# Patient Record
Sex: FEMALE | Race: BLACK OR AFRICAN AMERICAN | NOT HISPANIC OR LATINO | Employment: PART TIME | URBAN - METROPOLITAN AREA
[De-identification: names, ages, dates, MRNs, and addresses within clinical notes are randomized per-mention and may not be internally consistent; named-entity substitution may affect disease eponyms.]

---

## 2017-01-13 ENCOUNTER — HOSPITAL ENCOUNTER (OUTPATIENT)
Dept: PULMONOLOGY | Facility: OTHER | Age: 77
Discharge: HOME | End: 2017-01-13
Attending: INTERNAL MEDICINE
Payer: MEDICARE

## 2017-01-13 VITALS — HEIGHT: 63 IN | WEIGHT: 128 LBS | BODY MASS INDEX: 22.68 KG/M2

## 2017-01-13 DIAGNOSIS — R06.02 SHORTNESS OF BREATH: ICD-10-CM

## 2017-01-13 PROCEDURE — 94060 EVALUATION OF WHEEZING: CPT

## 2017-01-13 PROCEDURE — 94620 *HC PUL STRESS; SIMPLE (6MIN WALK): CPT

## 2017-01-13 PROCEDURE — 94729 DIFFUSING CAPACITY: CPT

## 2017-01-13 PROCEDURE — 94727 GAS DIL/WSHOT DETER LNG VOL: CPT

## 2017-01-13 RX ORDER — ALBUTEROL SULFATE 2.5 MG/.5ML
SOLUTION RESPIRATORY (INHALATION)
Status: DISCONTINUED
Start: 2017-01-13 | End: 2017-01-14 | Stop reason: HOSPADM

## 2017-01-16 NOTE — PROCEDURES
DIAGNOSIS:  Shortness of breath.    The patient is a 76-year-old female, 63 inches tall, 128 pounds.  She has a   forced vital capacity of 1.55 L, which is 70% of predicted.  The FEV1 is 1.15,   which is 76% of predicted.  FEF 25-75 is reduced at 39% of predicted.  There is   no significant response to bronchodilators.  Lung volume shows air trapping.    Diffusing capacity corrects for alveolar ventilation.  Flow volume loop and   spirogram are consistent with above.    ASSESSMENT:  1.  Small airway obstruction with no significant response to bronchodilators.  2.  Air trapping consistent with above.  3.  Mild restrictive ventilatory defect.  4.  No evidence of a diffusion defect.      CCS/SN  dd: 01/16/2017 08:26:39 (CST)  td: 01/16/2017 08:48:20 (CST)  Doc ID   #[id number]  Job ID #[id number]    CC: Mauricio Calderon M.D.

## 2017-05-08 NOTE — PLAN OF CARE
05/08/17 1241   ED Admissions Case Approval   ED Admissions Case Approval (!) CM Approved  (OP )

## 2017-05-09 ENCOUNTER — HOSPITAL ENCOUNTER (OUTPATIENT)
Dept: RADIOLOGY | Facility: OTHER | Age: 77
Discharge: HOME | End: 2017-05-09
Attending: ANESTHESIOLOGY
Payer: MEDICARE

## 2017-05-09 ENCOUNTER — HOSPITAL ENCOUNTER (OUTPATIENT)
Dept: PREADMISSION TESTING | Facility: OTHER | Age: 77
Discharge: HOME | End: 2017-05-09
Attending: SPECIALIST
Payer: MEDICARE

## 2017-05-09 VITALS
SYSTOLIC BLOOD PRESSURE: 154 MMHG | OXYGEN SATURATION: 99 % | HEIGHT: 63 IN | WEIGHT: 127 LBS | TEMPERATURE: 98 F | BODY MASS INDEX: 22.5 KG/M2 | HEART RATE: 86 BPM | DIASTOLIC BLOOD PRESSURE: 71 MMHG

## 2017-05-09 DIAGNOSIS — I10 HTN (HYPERTENSION): ICD-10-CM

## 2017-05-09 DIAGNOSIS — I10 ESSENTIAL HYPERTENSION: ICD-10-CM

## 2017-05-09 DIAGNOSIS — R06.02 SHORTNESS OF BREATH: ICD-10-CM

## 2017-05-09 DIAGNOSIS — Z01.818 PREOP TESTING: Primary | ICD-10-CM

## 2017-05-09 LAB
BNP SERPL-MCNC: 262 PG/ML
TROPONIN I SERPL DL<=0.01 NG/ML-MCNC: 0.04 NG/ML

## 2017-05-09 PROCEDURE — 84484 ASSAY OF TROPONIN QUANT: CPT

## 2017-05-09 PROCEDURE — 83880 ASSAY OF NATRIURETIC PEPTIDE: CPT

## 2017-05-09 PROCEDURE — 93005 ELECTROCARDIOGRAM TRACING: CPT

## 2017-05-09 PROCEDURE — 36415 COLL VENOUS BLD VENIPUNCTURE: CPT

## 2017-05-09 PROCEDURE — 71020 XR CHEST PA AND LATERAL PRE-OP: CPT | Mod: 26,,, | Performed by: RADIOLOGY

## 2017-05-09 PROCEDURE — 71020 XR CHEST PA AND LATERAL PRE-OP: CPT | Mod: TC

## 2017-05-09 PROCEDURE — 93010 ELECTROCARDIOGRAM REPORT: CPT | Mod: ,,, | Performed by: INTERNAL MEDICINE

## 2017-05-09 RX ORDER — SODIUM CHLORIDE, SODIUM LACTATE, POTASSIUM CHLORIDE, CALCIUM CHLORIDE 600; 310; 30; 20 MG/100ML; MG/100ML; MG/100ML; MG/100ML
INJECTION, SOLUTION INTRAVENOUS CONTINUOUS
Status: CANCELLED | OUTPATIENT
Start: 2017-05-09

## 2017-05-09 RX ORDER — PIOGLITAZONEHYDROCHLORIDE 15 MG/1
15 TABLET ORAL DAILY
COMMUNITY
End: 2018-03-07

## 2017-05-09 RX ORDER — FAMOTIDINE 20 MG/1
20 TABLET, FILM COATED ORAL
Status: CANCELLED | OUTPATIENT
Start: 2017-05-09 | End: 2017-05-09

## 2017-05-09 RX ORDER — PANTOPRAZOLE SODIUM 40 MG/1
40 TABLET, DELAYED RELEASE ORAL DAILY
COMMUNITY
End: 2019-01-01

## 2017-05-09 NOTE — IP AVS SNAPSHOT
[facility] Location ([facility])  [address] [place], [place] [address]  Phone: [phone]           Patient Discharge Instructions  Our goal is to set you up for success. This packet includes information on your condition, medications, and your home care. It will help you care for yourself to prevent having to return to the hospital.     Please ask your nurse if you have any questions.      There are many details to remember when preparing for your surgery. Here is what you will need to do, please ask your nurse if there are more specific instructions and if you have any questions:    1. Before procedure Do not smoke or drink alcoholic beverages 24 hours prior to your procedure. Do not eat or drink anything 8 hours before your procedure - this includes gum, mints, and candy.     2. Day of procedure Please remove all jewelry for the procedure. If you wear contact lenses, dentures, hearing aids or glasses, bring a container to put them in during your surgery and give to a family member.  If your doctor has scheduled you for an overnight stay, bring a small overnight bag with any personal items that you need.      3. After procedure  Make arrangements in advance for transportation home by a responsible adult. It is not safe to drive a vehicle during the 24 hours following surgery.     PLEASE NOTE: You may be contacted the day before your surgery to confirm your surgery date and arrival time. The Surgery schedule has many variables which may affect the time of your surgery case. Family members should be available if your surgery time changes.               ** Verify the list of medication(s) below is accurate and up to date. Carry this with you in case of emergency. If your medications have changed, please notify your healthcare provider.             Medication List      TAKE these medications        Additional Info                      ALEVE ORAL   Refills:  0   Dose:  1 tablet    Instructions:  Take 1  tablet by mouth as needed.     Begin Date    AM    Noon    PM    Bedtime       ANORO ELLIPTA INHL   Refills:  0    Instructions:  Inhale into the lungs once daily.     Begin Date    AM    Noon    PM    Bedtime       aspirin 81 MG EC tablet   Commonly known as:  ECOTRIN   Quantity:  90 tablet   Refills:  3   Dose:  81 mg    Instructions:  Take 1 tablet (81 mg total) by mouth once daily.     Begin Date    AM    Noon    PM    Bedtime       atorvastatin 20 MG tablet   Commonly known as:  LIPITOR   Quantity:  90 tablet   Refills:  3   Dose:  20 mg    Instructions:  Take 1 tablet (20 mg total) by mouth once daily.     Begin Date    AM    Noon    PM    Bedtime       benazepril 20 MG tablet   Commonly known as:  LOTENSIN   Quantity:  90 tablet   Refills:  3   Dose:  20 mg    Instructions:  Take 1 tablet (20 mg total) by mouth once daily.     Begin Date    AM    Noon    PM    Bedtime       FISH OIL ORAL   Refills:  0   Dose:  1200 mg    Instructions:  Take 1,200 mg by mouth.     Begin Date    AM    Noon    PM    Bedtime       FLUZONE HIGH-DOSE 2015-16 (PF) 180 mcg/0.5 mL Syrg   Refills:  0   Generic drug:  flu vacc ts 2015-16(65yr+)(PF)      Begin Date    AM    Noon    PM    Bedtime       furosemide 20 MG tablet   Commonly known as:  LASIX   Quantity:  90 tablet   Refills:  0    Instructions:  TAKE 1 TABLET BY MOUTH EVERY DAY     Begin Date    AM    Noon    PM    Bedtime       glipiZIDE 5 MG Tr24   Commonly known as:  GLUCOTROL   Refills:  4      Begin Date    AM    Noon    PM    Bedtime       IMODIUM A-D ORAL   Refills:  0   Dose:  1 tablet    Instructions:  Take 1 tablet by mouth as needed.     Begin Date    AM    Noon    PM    Bedtime       LINZESS ORAL   Refills:  0   Dose:  72 mcg    Instructions:  Take 72 mcg by mouth once daily.     Begin Date    AM    Noon    PM    Bedtime       metoprolol succinate 100 MG 24 hr tablet   Commonly known as:  TOPROL-XL   Quantity:  90 tablet   Refills:  0    Instructions:  TAKE 1  TABLET BY MOUTH EVERY DAY     Begin Date    AM    Noon    PM    Bedtime       pantoprazole 40 MG tablet   Commonly known as:  PROTONIX   Refills:  0   Dose:  40 mg    Instructions:  Take 40 mg by mouth once daily.     Begin Date    AM    Noon    PM    Bedtime       pioglitazone 15 MG tablet   Commonly known as:  ACTOS   Refills:  0   Dose:  15 mg    Instructions:  Take 15 mg by mouth once daily.     Begin Date    AM    Noon    PM    Bedtime       pioglitazone-metformin  mg per tablet   Commonly known as:  ACTOPLUS MET   Refills:  0      Begin Date    AM    Noon    PM    Bedtime       vitamin D 1000 units Tab   Refills:  0   Dose:  185 mg    Instructions:  Take 185 mg by mouth once daily.     Begin Date    AM    Noon    PM    Bedtime                  Please bring to all follow up appointments:    1. A copy of your discharge instructions.  2. All medicines you are currently taking in their original bottles.  3. Identification and insurance card.    Please arrive 15 minutes ahead of scheduled appointment time.    Please call 24 hours in advance if you must reschedule your appointment and/or time.        Your Scheduled Appointments     May 11, 2017 11:15 AM CDT   Established Patient Visit with Garrett Calderon MD   [facility]    [address], [address]  [facility] [address]   [phone]              Your Future Surgeries/Procedures     May 15, 2017   Surgery with Burke Perla MD   [facility] ([facility])    [address] [facility] [address]   [phone]                  Discharge Instructions       PRE-ADMIT TESTING -  [phone]    [address] [place] [address]        OUTPATIENT SURGERY UNIT - [phone]    Your surgery has been scheduled at [facility]. We are pleased to have the opportunity to serve you. For Further Information please call [phone].    On the day of surgery please  report to the Information Desk on the 1st floor.    CONTACT YOUR PHYSICIAN'S OFFICE THE DAY PRIOR TO YOUR SURGERY TO OBTAIN YOUR ARRIVAL TIME.     The evening before surgery do not eat anything after 9 p.m. ( this includes hard candy, chewing gum and mints).  You may only have GATORADE, POWERADE AND WATER  from 9 p.m. until you leave your home.   DO NOT DRINK ANY LIQUIDS ON THE WAY TO THE HOSPITAL.      SPECIAL MEDICATION INSTRUCTIONS: TAKE medications checked off by the Anesthesiologist on your Medication List.    Angiogram Patients: Take medications as instructed by your physician, including aspirin.     Surgery Patients:    If you take ASPIRIN - Your PHYSICIAN/SURGEON will need to inform you IF/OR when you need to stop taking aspirin prior to your surgery.     Do Not take any medications containing IBUPROFEN.  Do Not Wear any make-up or dark nail polish   (especially eye make-up) to surgery. If you come to surgery with makeup on you will be required to remove the makeup or nail polish.    Do not shave your surgical area at least 5 days prior to your surgery. The surgical prep will be performed at the hospital according to Infection Control regulations.    Leave all valuables at home.   Do Not wear any jewelry or watches, including any metal in body piercings.  Contact Lens must be removed before surgery. Either do not wear the contact lens or bring a case and solution for storage.  Please bring a container for eyeglasses or dentures as required.  Bring any paperwork your physician has provided, such as consent forms,  history and physicals, doctor's orders, etc.   Bring comfortable clothes that are loose fitting to wear upon discharge. Take into consideration the type of surgery being performed.  Maintain your diet as advised per your physician the day prior to surgery.      Adequate rest the night before surgery is advised.   Park in the Parking lot behind the hospital or in the [facility] [employer] [facility] across  "the street from the parking lot. Parking is complimentary.  If you will be discharged the same day as your procedure, please arrange for a responsible adult to drive you home or to accompany you if traveling by taxi.   YOU WILL NOT BE PERMITTED TO DRIVE OR TO LEAVE THE HOSPITAL ALONE AFTER SURGERY.   It is strongly recommended that you arrange for someone to remain with you for the first 24 hrs following your surgery.       Thank you for your cooperation.  The Staff of [facility].        Bathing Instructions                                                                 Please shower the evening before and morning of your procedure with    ANTIBACTERIAL SOAP. ( DIAL, etc )  Concentrate on the surgical area   for at least 3 minutes and rinse completely. Dry off as usual.   Do not use any deodorant, powder, body lotions, perfume, after shave or    cologne.                                                Admission Information     Date & Time Provider Department CSN    5/9/2017  8:30 AM Burke Perla MD [facility] [id number]      Care Providers     Provider Role Specialty Primary office phone    Burke Perla MD Attending Provider General Surgery [phone]      Your Vitals Were     BP Pulse Temp Height Weight SpO2    154/71 86 98.3 °F (36.8 °C) (Oral) 5' 3" (1.6 m) 57.6 kg (127 lb) 99%    BMI                22.5 kg/m2          Recent Lab Values     No lab values to display.      Allergies as of 5/9/2017        Reactions    Codeine Nausea And Vomiting    States can take oxycodone and hydrocodone    Sulfa (Sulfonamide Antibiotics) Nausea And Vomiting      [facility]     [facility] Nurse Care Line - 24/7 Assistance  Unless otherwise directed by your provider, please contact [facility], our nurse care line that is available for 24/7 assistance.     Registered nurses in the [facility] provide clinical advisement, health education, appointment " booking, and other advisory services.  Call for this free service at [phone].        Advance Directives     An advance directive is a document which, in the event you are no longer able to make decisions for yourself, tells your healthcare team what kind of treatment you do or do not want to receive, or who you would like to make those decisions for you.  If you do not currently have an advance directive, [facility] encourages you to create one.  For more information call:  [phone]), [phone] ([phone]),  or log on to [email or web address].        Language Assistance Services     ATTENTION: Language assistance services are available, free of charge. Please call [phone].      ATENCIÓN: Si habla español, tiene a zuluaga disposición servicios gratuitos de asistencia lingüística. Llame al [phone].     CHÚ Ý: N?u b?n nói Ti?ng Vi?t, có các d?ch v? h? tr? ngôn ng? mi?n phí dành cho b?n. G?i s? [phone].        Heart Failure Education       Heart Failure: Being Active  You have a condition called heart failure. Being active doesnt mean that you have to wear yourself out. Even a little movement each day helps to strengthen your heart. If you cant get out to exercise, you can do simple stretching and strengthening exercises at home. These are good ways to keep you well-conditioned and prevent you and your heart from becoming excessively weak.    Ideas to get you started  · Add a little movement to things you do now. Walk to mail letters. Park your car at the far end of the parking lot and walk to the store. Walk up a flight of stairs instead of taking the elevator.  · Choose activities you enjoy. You might walk, swim, or ride an exercise bike. Things like gardening and washing the car count, too. Other possibilities include: washing dishes, walking the dog, walking around the mall, and doing aerobic activities with friends.  · Join a group exercise program at a [facility] or [facility],  a senior center, or a community center. Or look into a hospital cardiac rehabilitation program. Ask your doctor if you qualify.  Tips to keep you going  · Get up and get dressed each day. Go to a coffee shop and read a newspaper or go somewhere that you'll be in the presence of other active people. Youll feel more like being active.  · Make a plan. Choose one or more activities that you enjoy and that you can easily do. Then plan to do at least one each day. You might write your plan on a calendar.  · Go with a friend or a group if you like company. This can help you feel supported and stay motivated, too.  · Plan social events that you enjoy. This will keep you mentally engaged as well as physically motivated to do things you find pleasure in.  For your safety  · Talk with your healthcare provider before starting an exercise program.  · Exercise indoors when its too hot or too cold outside, or when the air quality is poor. Try walking at a shopping mall.  · Wear socks and sturdy shoes to maintain your balance and prevent falls.  · Start slowly. Do a few minutes several times a day at first. Increase your time and speed little by little.  · Stop and rest whenever you feel tired or get short of breath.  · Dont push yourself on days when you dont feel well.  Date Last Reviewed: 3/20/2016  © 1785-6657 [employer]. [address] [place] [address]. All rights reserved. This information is not intended as a substitute for professional medical care. Always follow your healthcare professional's instructions.              Heart Failure: Evaluating Your Heart  You have a condition called heart failure. To evaluate your condition, your doctor will examine you, ask questions, and do some tests. Along with looking for signs of heart failure, the doctor looks for any other health problems that may have led to heart failure. The results of your evaluation will help your doctor form a treatment  plan.  Health history and physical exam  Your visit will start with a health history. Tell the doctor about any symptoms youve noticed and about all medicines you take. Then youll have a physical exam. This includes listening to your heartbeat and breathing. Youll also be checked for swelling (edema) in your legs and neck. When you have fluid buildup or fluid in the lungs, it may be called congestive heart failure.  Diagnosing heart failure     During an echocardiogram, sound waves bounce off the heart. These are converted into a picture on the screen.   The following may be done to help your doctor form a diagnosis:  · X-rays show the size and shape of your heart. These pictures can also show fluid in your lungs.  · An electrocardiogram (ECG or EKG) shows the pattern of your heartbeat. Small pads (electrodes) are placed on your chest, arms, and legs. Wires connect the pads to the ECG machine, which records your hearts electrical signals. This can give the doctor information about heart function.  · An echocardiogram uses ultrasound waves to show the structure and movement of your heart muscle. This shows how well the heart pumps. It also shows the thickness of the heart walls, and if the heart is enlarged. It is one of the most useful, non-invasive tests as it provides information about the heart's general function. This helps your doctor make treatment decisions.  · Lab tests evaluate small amounts of blood or urine for signs of problems. A BNP lab test can help diagnose and evaluate heart failure. BNP stands for B-type natriuretic peptide. The ventricles secrete more BNP when heart failure worsens. Lab tests can also provide information about metabolic dysfunction or heart dysfunction.  Your treatment plan  Based on the results of your evaluation and tests, your doctor will develop a treatment plan. This plan is designed to relieve some of your heart failure symptoms and help make you more comfortable. Your  treatment plan may include:  · Medicine to help your heart work better and improve your quality of life  · Changes in what you eat and drink to help prevent fluid from backing up in your body  · Daily monitoring of your weight and heart failure symptoms to see how well your treatment plan is working  · Exercise to help you stay healthy  · Help with quitting smoking  · Emotional and psychological support to help adjust to the changes  · Referrals to other specialists to make sure you are being treated comprehensively  Date Last Reviewed: 3/21/2016  © 0966-5569 [employer]. [address] [place] [address], [place] [address]. All rights reserved. This information is not intended as a substitute for professional medical care. Always follow your healthcare professional's instructions.              Heart Failure: Making Changes to Your Diet  You have a condition called heart failure. When you have heart failure, excess fluid is more likely to build up in your body because your heart isn't working well. This makes the heart work harder to pump blood. Fluid buildup causes symptoms such as shortness of breath and swelling (edema). This is often referred to as congestive heart failure or CHF. Controlling the amount of salt (sodium) you eat may help stop fluid from building up. Your doctor may also tell you to reduce the amount of fluid you drink.  Reading food labels    Your healthcare provider will tell you how much sodium you can eat each day. Read food labels to keep track. Keep in mind that certain foods are high in salt. These include canned, frozen, and processed foods. Check the amount of sodium in each serving. Watch out for high-sodium ingredients. These include MSG (monosodium glutamate), baking soda, and sodium phosphate.   Eating less salt  Give yourself time to get used to eating less salt. It may take a little while. Here are some tips to help:  · Take the saltshaker off the table. Replace it with salt-free  herb mixes and spices.  · Eat fresh or plain frozen vegetables. These have much less salt than canned vegetables.  · Choose low-sodium snacks like sodium-free pretzels, crackers, or air-popped popcorn.  · Dont add salt to your food when youre cooking. Instead, season your foods with pepper, lemon, garlic, or onion.  · When you eat out, ask that your food be cooked without added salt.  · Avoid eating fried foods as these often have a great deal of salt.  If youre told to limit fluids  You may need to limit how much fluid you have to help prevent swelling. This includes anything that is liquid at room temperature, such as ice cream and soup. If your doctor tells you to limit fluid, try these tips:  · Measure drinks in a measuring cup before you drink them. This will help you meet daily goals.  · Chill drinks to make them more refreshing.  · Suck on frozen lemon wedges to quench thirst.  · Only drink when youre thirsty.  · Chew sugarless gum or suck on hard candy to keep your mouth moist.  · Weigh yourself daily to know if your body's fluid content is rising.  My sodium goal  Your healthcare provider may give you a sodium goal to meet each day. This includes sodium found in food as well as salt that you add. My goal is to eat no more than ___________ mg of sodium per day.     When to call your doctor  Call your doctor right away if you have any symptoms of worsening heart failure. These can include:  · Sudden weight gain  · Increased swelling of your legs or ankles  · Trouble breathing when youre resting or at night  · Increase in the number of pillows you have to sleep on  · Chest pain, pressure, discomfort, or pain in the jaw, neck, or back   Date Last Reviewed: 3/21/2016  © 6505-0389 [employer]. [address] [place] [address], [place] [address]. All rights reserved. This information is not intended as a substitute for professional medical care. Always follow your healthcare professional's  instructions.              Heart Failure: Medicines to Help Your Heart    You have a condition called heart failure (also known as congestive heart failure, or CHF). Your doctor will likely prescribe medicines for heart failure and any underlying health problems you have. Most heart failure patients take one or more types of medicinen. Your healthcare provider will work to find the combination of medicines that works best for you.  Heart failure medicines  Here are the most common heart failure medicines:  · ACE inhibitors lower blood pressure and decrease strain on the heart. This makes it easier for the heart to pump. Angiotensin receptor blockers have similar effects. These are prescribed for some patients instead of ACE inhibitors.  · Beta-blockers relieve stress on the heart. They also improve symptoms. They may also improve the heart's pumping action over time.  · Diuretics (also called water pills) help rid your body of excess water. This can help rid your body of swelling (edema). Having less fluid to pump means your heart doesnt have to work as hard. Some diuretics make your body lose a mineral called potassium. Your doctor will tell you if you need to take supplements or eat more foods high in potassium.  · Digoxin helps your heart pump with more strength. This helps your heart pump more blood with each beat. So, more oxygen-rich blood travels to the rest of the body.  · Aldosterone antagonists help alter hormones and decrease strain on the heart.  · Hydralazine and nitrates are two separate medicines used together to treat heart failure. They may come in one combination pill. They lower blood pressure and decrease how hard the heart has to pump.  Medicines for related conditions  Controlling other heart problems helps keep heart failure under control, too. Depending on other heart problems you have, medicines may be prescribed to:  · Lower blood pressure (antihypertensives).  · Lower cholesterol  levels (statins).  · Prevent blood clots (anticoagulants or aspirin).  · Keep the heartbeat steady (antiarrhythmics).  Date Last Reviewed: 3/5/2016  © 8532-2561 [employer]. [address] [place] [address], [place] [address]. All rights reserved. This information is not intended as a substitute for professional medical care. Always follow your healthcare professional's instructions.              Heart Failure: Procedures That May Help    The heart is a muscle that pumps oxygen-rich blood to all parts of the body. When you have heart failure, the heart is not able to pump as well as it should. Blood and fluid may back up into the lungs (congestive heart failure), and some parts of the body dont get enough oxygen-rich blood to work normally. These problems lead to the symptoms of heart failure.     Certain procedures may help the heart pump better in some cases of heart failure. Some procedures are done to treat health problems that may have caused the heart failure such as coronary artery disease or heart rhythm problems. For more serious heart failure, other options are available.  Treating artery and valve problems  If you have coronary artery disease or valve disease, procedures may be done to improve blood flow. This helps the heart pump better, which can improve heart failure symptoms. First, your doctor may do a cardiac catheterization to help detect clogged blood vessels or valve damage. During this procedure, a  thin tube (catheter) in inserted into a blood vessel and guided to the heart. There a dye is injected and a special type of X-ray (angiogram) is taken of the blood vessels. Procedures to open a blocked artery or fix damaged valves can also be done using catheterization.  · Angioplasty uses a balloon-tipped instrument at the end of the catheter. The balloon is inflated to widen the narrowed artery. In many cases, a stent is expanded to further support the narrowed artery. A stent is a metal mesh  tube.  · Valve surgery repairs or replacement of faulty valves can also be done during catheterization so blood can flow properly through the chambers of the heart.  Bypass surgery is another option to help treat blocked arteries. It uses a healthy blood vessel from elsewhere in the body. The healthy blood vessel is attached above and below the blocked area so that blood can flow around the blocked artery.  Treating heart rhythm problems  A device may be placed in the chest to help a weak heart maintain a healthy, heartbeat so the heart can pump more effectively:  · Pacemaker. A pacemaker is an implanted device that regulates your heartbeat electronically. It monitors your heart's rhythm and generates a painless electric impulse that helps the heart beat in a regular rhythm. A pacemaker is programmed to meet your specific heart rhythm needs.  · Biventricular pacing/cardiac resynchronization therapy. A type of pacemaker that paces both pumping chambers of the heart at the same time to coordinate contractions and to improve the heart's function. Some people with heart failure are candidates for this therapy.  · Implantable cardioverter defibrillator. A device similar to a pacemaker that senses when the heart is beating too fast and delivers an electrical shock to convert the fast rhythm to a normal rhythm. This can be a life saving device.  In severe cases  In more serious cases of heart failure when other treatments no longer work, other options may include:  · Ventricular assist devices (VADs). These are mechanical devices used to take over the pumping function for one or both of the heart's ventricles, or pumping chambers. A VAD may be necessary when heart failure progresses to the point that medicines and other treatments no longer help. In some cases, a VAD may be used as a bridge to transplant.  · Heart transplant. This is replacing the diseased heart with a healthy one from a donor. This is an option for a few  people who are very sick. A heart transplant is very serious and not an option for all patients. Your doctor can tell you more.  Date Last Reviewed: 3/20/2016  © 5104-7988 [employer]. [address] [place] [address], [place] [address]. All rights reserved. This information is not intended as a substitute for professional medical care. Always follow your healthcare professional's instructions.              Heart Failure: Tracking Your Weight  You have a condition called heart failure. When you have heart failure, a sudden weight gain or a steady rise in weight is a warning sign that your body is retaining too much water and salt. This could mean your heart failure is getting worse. If left untreated, it can cause problems for your lungs and result in shortness of breath. Weighing yourself each day is the best way to know if youre retaining water. If your weight goes up quickly, call your doctor. You will be given instructions on how to get rid of the excess water. You will likely need medicines and to avoid salt. This will help your heart work better.  Call your doctor if you gain more than 2 pounds in 1 day, more than 5 pounds in 1 week, or whatever weight gain you were told to report by your doctor. This is often a sign of worsening heart failure and needs to be evaluated and treated. Your doctor will tell you what to do next.   Tips for weighing yourself    · Weigh yourself at the same time each morning, wearing the same clothes. Weigh yourself after urinating and before eating.  · Use the same scale each day. Make sure the numbers are easy to read. Put the scale on a flat, hard surface -- not on a rug or carpet.  · Do not stop weighing yourself. If you forget one day, weigh again the next morning.  How to use your weight chart  · Keep your weight chart near the scale. Write your weight on the chart as soon as you get off the scale.  · Fill in the month and the start date on the chart. Then write down your  weight each day. Your chart will look like this:    · If you miss a day, leave the space blank. Weigh yourself the next day and write your weight in the next space.  · Take your weight chart with you when you go to see your doctor.  Date Last Reviewed: 3/20/2016  © 3057-0005 [employer]. [address] [place] [address], [place] [address]. All rights reserved. This information is not intended as a substitute for professional medical care. Always follow your healthcare professional's instructions.              Heart Failure: Warning Signs of a Flare-Up  You have a condition called heart failure. Once you have heart failure, flare-ups can happen. Below are signs that can mean your heart failure is getting worse. If you notice any of these warning signs, call your healthcare provider.  Swelling    · Your feet, ankles, or lower legs get puffier.  · You notice skin changes on your lower legs.  · Your shoes feel too tight.  · Your clothes are tighter in the waist.  · You have trouble getting rings on or off your fingers.  Shortness of breath  · You have to breathe harder even when youre doing your normal activities or when youre resting.  · You are short of breath walking up stairs or even short distances.  · You wake up at night short of breath or coughing.  · You need to use more pillows or sit up to sleep.  · You wake up tired or restless.  Other warning signs  · You feel weaker, dizzy, or more tired.  · You have chest pain or changes in your heartbeat.  · You have a cough that wont go away.  · You cant remember things or dont feel like eating.  Tracking your weight  Gaining weight is often the first warning sign that heart failure is getting worse. Gaining even a few pounds can be a sign that your body is retaining excess water and salt. Weighing yourself each day in the morning after you urinate and before you eat, is the best way to know if you're retaining water. Get a scale that is easy to read and make  sure you wear the same clothes and use the same scale every time you weigh. Your healthcare provider will show you how to track your weight. Call your doctor if you gain more than 2 pounds in 1 day, 5 pounds in 1 week, or whatever weight gain you were told to report by your doctor. This is often a sign of worsening heart failure and needs to be evaluated and treated before it compromises your breathing. Your doctor will tell you what to do next.    Date Last Reviewed: 3/15/2016  © 5324-3008 [employer]. [address] [place] [address]. All rights reserved. This information is not intended as a substitute for professional medical care. Always follow your healthcare professional's instructions.              Chronic Kindey Disease Education             Diabetes Discharge Instructions                                   [facility] Sign-Up     Activating your [facility] account is as easy as 1-2-3!     1) Visit [email or web address], select Sign Up Now, enter this activation code and your date of birth, then select Next.  [email or web address]  Expires: 6/23/2017  8:58 AM      2) Create a username and password to use when you visit [facility] in the future and select a security question in case you lose your password and select Next.    3) Enter your e-mail address and click Sign Up!    Additional Information  If you have questions, please e-mail [email or web address] or call [phone] to talk to our [facility] staff. Remember, [facility] is NOT to be used for urgent needs. For medical emergencies, dial 911.          [facility] complies with applicable Federal civil rights laws and does not discriminate on the basis of race, color, national origin, age, disability, or sex.

## 2017-05-09 NOTE — PRE ADMISSION SCREENING
Outside lab (4/17/17) reviewed per Dr. Leyva.  Ok to cancel cbc, bmp.  EKG done today reviewed per Dr. Leyva.

## 2017-05-09 NOTE — DISCHARGE INSTRUCTIONS
PRE-ADMIT TESTING -  [phone]    [address]  [facility]        OUTPATIENT SURGERY UNIT - [phone]    Your surgery has been scheduled at [facility]. We are pleased to have the opportunity to serve you. For Further Information please call [phone].    On the day of surgery please report to the Information Desk on the 1st floor.    CONTACT YOUR PHYSICIAN'S OFFICE THE DAY PRIOR TO YOUR SURGERY TO OBTAIN YOUR ARRIVAL TIME.     The evening before surgery do not eat anything after 9 p.m. ( this includes hard candy, chewing gum and mints).  You may only have GATORADE, POWERADE AND WATER  from 9 p.m. until you leave your home.   DO NOT DRINK ANY LIQUIDS ON THE WAY TO THE HOSPITAL.      SPECIAL MEDICATION INSTRUCTIONS: TAKE medications checked off by the Anesthesiologist on your Medication List.    Angiogram Patients: Take medications as instructed by your physician, including aspirin.     Surgery Patients:    If you take ASPIRIN - Your PHYSICIAN/SURGEON will need to inform you IF/OR when you need to stop taking aspirin prior to your surgery.     Do Not take any medications containing IBUPROFEN.  Do Not Wear any make-up or dark nail polish   (especially eye make-up) to surgery. If you come to surgery with makeup on you will be required to remove the makeup or nail polish.    Do not shave your surgical area at least 5 days prior to your surgery. The surgical prep will be performed at the hospital according to Infection Control regulations.    Leave all valuables at home.   Do Not wear any jewelry or watches, including any metal in body piercings.  Contact Lens must be removed before surgery. Either do not wear the contact lens or bring a case and solution for storage.  Please bring a container for eyeglasses or dentures as required.  Bring any paperwork your physician has provided, such as consent forms,  history and physicals, doctor's orders, etc.   Bring comfortable clothes that  are loose fitting to wear upon discharge. Take into consideration the type of surgery being performed.  Maintain your diet as advised per your physician the day prior to surgery.      Adequate rest the night before surgery is advised.   Park in the Parking lot behind the hospital or in the [facility] across the street from the parking lot. Parking is complimentary.  If you will be discharged the same day as your procedure, please arrange for a responsible adult to drive you home or to accompany you if traveling by taxi.   YOU WILL NOT BE PERMITTED TO DRIVE OR TO LEAVE THE HOSPITAL ALONE AFTER SURGERY.   It is strongly recommended that you arrange for someone to remain with you for the first 24 hrs following your surgery.       Thank you for your cooperation.  The Staff of [facility].        Bathing Instructions                                                                 Please shower the evening before and morning of your procedure with    ANTIBACTERIAL SOAP. ( DIAL, etc )  Concentrate on the surgical area   for at least 3 minutes and rinse completely. Dry off as usual.   Do not use any deodorant, powder, body lotions, perfume, after shave or    cologne.

## 2017-05-15 ENCOUNTER — HOSPITAL ENCOUNTER (OUTPATIENT)
Facility: OTHER | Age: 77
LOS: 2 days | Discharge: HOME | End: 2017-05-18
Attending: SPECIALIST | Admitting: SPECIALIST
Payer: MEDICARE

## 2017-05-15 DIAGNOSIS — R06.02 SHORTNESS OF BREATH: ICD-10-CM

## 2017-05-15 DIAGNOSIS — R07.9 CHEST PAIN: ICD-10-CM

## 2017-05-15 DIAGNOSIS — K80.00 CALCULUS OF GALLBLADDER WITH ACUTE CHOLECYSTITIS WITHOUT OBSTRUCTION: Primary | ICD-10-CM

## 2017-05-15 DIAGNOSIS — Z01.818 PREOPERATIVE CLEARANCE: ICD-10-CM

## 2017-05-15 DIAGNOSIS — I50.32 HEART FAILURE, DIASTOLIC, CHRONIC: ICD-10-CM

## 2017-05-15 LAB
ALBUMIN SERPL BCP-MCNC: 3.6 G/DL
ALP SERPL-CCNC: 70 U/L
ALT SERPL W/O P-5'-P-CCNC: 24 U/L
ANION GAP SERPL CALC-SCNC: 13 MMOL/L
AST SERPL-CCNC: 21 U/L
BASOPHILS # BLD AUTO: 0.01 K/UL
BASOPHILS NFR BLD: 0.1 %
BILIRUB SERPL-MCNC: 1 MG/DL
BUN SERPL-MCNC: 16 MG/DL
CALCIUM SERPL-MCNC: 9.6 MG/DL
CHLORIDE SERPL-SCNC: 103 MMOL/L
CO2 SERPL-SCNC: 24 MMOL/L
CREAT SERPL-MCNC: 1.2 MG/DL
D DIMER PPP IA.FEU-MCNC: 1.17 MG/L FEU
DIFFERENTIAL METHOD: ABNORMAL
EOSINOPHIL # BLD AUTO: 0 K/UL
EOSINOPHIL NFR BLD: 0.3 %
ERYTHROCYTE [DISTWIDTH] IN BLOOD BY AUTOMATED COUNT: 14.8 %
EST. GFR  (AFRICAN AMERICAN): 51 ML/MIN/1.73 M^2
EST. GFR  (NON AFRICAN AMERICAN): 44 ML/MIN/1.73 M^2
GLUCOSE SERPL-MCNC: 259 MG/DL
HCT VFR BLD AUTO: 35.9 %
HGB BLD-MCNC: 11.9 G/DL
LYMPHOCYTES # BLD AUTO: 1.6 K/UL
LYMPHOCYTES NFR BLD: 16.1 %
MCH RBC QN AUTO: 30.3 PG
MCHC RBC AUTO-ENTMCNC: 33.1 %
MCV RBC AUTO: 91 FL
MONOCYTES # BLD AUTO: 0.5 K/UL
MONOCYTES NFR BLD: 4.6 %
NEUTROPHILS # BLD AUTO: 7.8 K/UL
NEUTROPHILS NFR BLD: 78.4 %
PLATELET # BLD AUTO: 170 K/UL
PMV BLD AUTO: 13.4 FL
POCT GLUCOSE: 254 MG/DL (ref 70–110)
POCT GLUCOSE: 86 MG/DL (ref 70–110)
POTASSIUM SERPL-SCNC: 4.3 MMOL/L
PROT SERPL-MCNC: 7.6 G/DL
RBC # BLD AUTO: 3.93 M/UL
SODIUM SERPL-SCNC: 140 MMOL/L
TROPONIN I SERPL DL<=0.01 NG/ML-MCNC: 0.12 NG/ML
WBC # BLD AUTO: 9.98 K/UL

## 2017-05-15 PROCEDURE — 25000242 PHARM REV CODE 250 ALT 637 W/ HCPCS: Performed by: ANESTHESIOLOGY

## 2017-05-15 PROCEDURE — 36415 COLL VENOUS BLD VENIPUNCTURE: CPT

## 2017-05-15 PROCEDURE — 20000000 HC ICU ROOM

## 2017-05-15 PROCEDURE — 25000003 PHARM REV CODE 250: Performed by: ANESTHESIOLOGY

## 2017-05-15 PROCEDURE — 27000221 HC OXYGEN, UP TO 24 HOURS

## 2017-05-15 PROCEDURE — G0378 HOSPITAL OBSERVATION PER HR: HCPCS

## 2017-05-15 PROCEDURE — 80053 COMPREHEN METABOLIC PANEL: CPT

## 2017-05-15 PROCEDURE — 82962 GLUCOSE BLOOD TEST: CPT | Performed by: SPECIALIST

## 2017-05-15 PROCEDURE — 85025 COMPLETE CBC W/AUTO DIFF WBC: CPT

## 2017-05-15 PROCEDURE — 63600175 PHARM REV CODE 636 W HCPCS: Performed by: INTERNAL MEDICINE

## 2017-05-15 PROCEDURE — 94640 AIRWAY INHALATION TREATMENT: CPT

## 2017-05-15 PROCEDURE — 93010 ELECTROCARDIOGRAM REPORT: CPT | Mod: ,,, | Performed by: INTERNAL MEDICINE

## 2017-05-15 PROCEDURE — 85379 FIBRIN DEGRADATION QUANT: CPT

## 2017-05-15 PROCEDURE — 93005 ELECTROCARDIOGRAM TRACING: CPT

## 2017-05-15 PROCEDURE — 99900035 HC TECH TIME PER 15 MIN (STAT)

## 2017-05-15 PROCEDURE — 63600175 PHARM REV CODE 636 W HCPCS: Performed by: SPECIALIST

## 2017-05-15 PROCEDURE — 83036 HEMOGLOBIN GLYCOSYLATED A1C: CPT

## 2017-05-15 PROCEDURE — 84484 ASSAY OF TROPONIN QUANT: CPT

## 2017-05-15 RX ORDER — SODIUM CHLORIDE 0.9 % (FLUSH) 0.9 %
3 SYRINGE (ML) INJECTION
Status: DISCONTINUED | OUTPATIENT
Start: 2017-05-15 | End: 2017-05-18 | Stop reason: HOSPADM

## 2017-05-15 RX ORDER — INSULIN ASPART 100 [IU]/ML
0-5 INJECTION, SOLUTION INTRAVENOUS; SUBCUTANEOUS
Status: DISCONTINUED | OUTPATIENT
Start: 2017-05-15 | End: 2017-05-18 | Stop reason: HOSPADM

## 2017-05-15 RX ORDER — GLUCAGON 1 MG
1 KIT INJECTION
Status: DISCONTINUED | OUTPATIENT
Start: 2017-05-15 | End: 2017-05-18 | Stop reason: HOSPADM

## 2017-05-15 RX ORDER — MEPERIDINE HYDROCHLORIDE 50 MG/ML
12.5 INJECTION INTRAMUSCULAR; INTRAVENOUS; SUBCUTANEOUS ONCE AS NEEDED
Status: ACTIVE | OUTPATIENT
Start: 2017-05-15 | End: 2017-05-15

## 2017-05-15 RX ORDER — ENOXAPARIN SODIUM 100 MG/ML
40 INJECTION SUBCUTANEOUS
Status: DISCONTINUED | OUTPATIENT
Start: 2017-05-15 | End: 2017-05-18 | Stop reason: HOSPADM

## 2017-05-15 RX ORDER — ONDANSETRON 2 MG/ML
4 INJECTION INTRAMUSCULAR; INTRAVENOUS ONCE AS NEEDED
Status: ACTIVE | OUTPATIENT
Start: 2017-05-15 | End: 2017-05-15

## 2017-05-15 RX ORDER — ALBUTEROL SULFATE 0.83 MG/ML
2.5 SOLUTION RESPIRATORY (INHALATION)
Status: COMPLETED | OUTPATIENT
Start: 2017-05-15 | End: 2017-05-15

## 2017-05-15 RX ORDER — CEFAZOLIN SODIUM 1 G/50ML
1 SOLUTION INTRAVENOUS
Status: ACTIVE | OUTPATIENT
Start: 2017-05-15 | End: 2017-05-15

## 2017-05-15 RX ORDER — ONDANSETRON 2 MG/ML
4 INJECTION INTRAMUSCULAR; INTRAVENOUS EVERY 12 HOURS PRN
Status: DISCONTINUED | OUTPATIENT
Start: 2017-05-15 | End: 2017-05-18 | Stop reason: HOSPADM

## 2017-05-15 RX ORDER — FENTANYL CITRATE 50 UG/ML
25 INJECTION, SOLUTION INTRAMUSCULAR; INTRAVENOUS EVERY 5 MIN PRN
Status: DISCONTINUED | OUTPATIENT
Start: 2017-05-15 | End: 2017-05-17

## 2017-05-15 RX ORDER — LIDOCAINE HYDROCHLORIDE 10 MG/ML
1 INJECTION, SOLUTION EPIDURAL; INFILTRATION; INTRACAUDAL; PERINEURAL ONCE
Status: DISCONTINUED | OUTPATIENT
Start: 2017-05-15 | End: 2017-05-15 | Stop reason: HOSPADM

## 2017-05-15 RX ORDER — ATORVASTATIN CALCIUM 20 MG/1
20 TABLET, FILM COATED ORAL DAILY
Status: DISCONTINUED | OUTPATIENT
Start: 2017-05-16 | End: 2017-05-18 | Stop reason: HOSPADM

## 2017-05-15 RX ORDER — SODIUM CHLORIDE, SODIUM LACTATE, POTASSIUM CHLORIDE, CALCIUM CHLORIDE 600; 310; 30; 20 MG/100ML; MG/100ML; MG/100ML; MG/100ML
INJECTION, SOLUTION INTRAVENOUS CONTINUOUS
Status: DISCONTINUED | OUTPATIENT
Start: 2017-05-15 | End: 2017-05-15

## 2017-05-15 RX ORDER — SODIUM CHLORIDE 9 MG/ML
INJECTION, SOLUTION INTRAVENOUS CONTINUOUS
Status: DISCONTINUED | OUTPATIENT
Start: 2017-05-15 | End: 2017-05-16

## 2017-05-15 RX ORDER — TIOTROPIUM BROMIDE 18 UG/1
1 CAPSULE ORAL; RESPIRATORY (INHALATION) DAILY
Status: DISCONTINUED | OUTPATIENT
Start: 2017-05-16 | End: 2017-05-18 | Stop reason: HOSPADM

## 2017-05-15 RX ORDER — PANTOPRAZOLE SODIUM 40 MG/1
40 TABLET, DELAYED RELEASE ORAL DAILY
Status: DISCONTINUED | OUTPATIENT
Start: 2017-05-16 | End: 2017-05-18 | Stop reason: HOSPADM

## 2017-05-15 RX ORDER — BENAZEPRIL HYDROCHLORIDE 10 MG/1
20 TABLET ORAL DAILY
Status: DISCONTINUED | OUTPATIENT
Start: 2017-05-16 | End: 2017-05-18 | Stop reason: HOSPADM

## 2017-05-15 RX ORDER — ALBUTEROL SULFATE 0.83 MG/ML
2.5 SOLUTION RESPIRATORY (INHALATION)
Status: DISCONTINUED | OUTPATIENT
Start: 2017-05-15 | End: 2017-05-15 | Stop reason: HOSPADM

## 2017-05-15 RX ORDER — OXYCODONE HYDROCHLORIDE 5 MG/1
5 TABLET ORAL
Status: DISCONTINUED | OUTPATIENT
Start: 2017-05-15 | End: 2017-05-17

## 2017-05-15 RX ORDER — FAMOTIDINE 20 MG/1
20 TABLET, FILM COATED ORAL
Status: COMPLETED | OUTPATIENT
Start: 2017-05-15 | End: 2017-05-15

## 2017-05-15 RX ORDER — FUROSEMIDE 20 MG/1
20 TABLET ORAL DAILY
Status: DISCONTINUED | OUTPATIENT
Start: 2017-05-16 | End: 2017-05-18 | Stop reason: HOSPADM

## 2017-05-15 RX ORDER — ASPIRIN 81 MG/1
81 TABLET ORAL DAILY
Status: DISCONTINUED | OUTPATIENT
Start: 2017-05-16 | End: 2017-05-18 | Stop reason: HOSPADM

## 2017-05-15 RX ORDER — METOPROLOL SUCCINATE 50 MG/1
100 TABLET, EXTENDED RELEASE ORAL DAILY
Status: DISCONTINUED | OUTPATIENT
Start: 2017-05-16 | End: 2017-05-18 | Stop reason: HOSPADM

## 2017-05-15 RX ORDER — FUROSEMIDE 10 MG/ML
20 INJECTION INTRAMUSCULAR; INTRAVENOUS ONCE
Status: COMPLETED | OUTPATIENT
Start: 2017-05-15 | End: 2017-05-15

## 2017-05-15 RX ORDER — IBUPROFEN 200 MG
24 TABLET ORAL
Status: DISCONTINUED | OUTPATIENT
Start: 2017-05-15 | End: 2017-05-18 | Stop reason: HOSPADM

## 2017-05-15 RX ORDER — HYDROCODONE BITARTRATE AND ACETAMINOPHEN 5; 325 MG/1; MG/1
1 TABLET ORAL EVERY 4 HOURS PRN
Status: DISCONTINUED | OUTPATIENT
Start: 2017-05-15 | End: 2017-05-18 | Stop reason: HOSPADM

## 2017-05-15 RX ORDER — IBUPROFEN 200 MG
16 TABLET ORAL
Status: DISCONTINUED | OUTPATIENT
Start: 2017-05-15 | End: 2017-05-18 | Stop reason: HOSPADM

## 2017-05-15 RX ORDER — HYDROMORPHONE HYDROCHLORIDE 2 MG/ML
0.4 INJECTION, SOLUTION INTRAMUSCULAR; INTRAVENOUS; SUBCUTANEOUS EVERY 5 MIN PRN
Status: DISCONTINUED | OUTPATIENT
Start: 2017-05-15 | End: 2017-05-17

## 2017-05-15 RX ORDER — DEXTROSE MONOHYDRATE, SODIUM CHLORIDE, AND POTASSIUM CHLORIDE 50; 1.49; 4.5 G/1000ML; G/1000ML; G/1000ML
INJECTION, SOLUTION INTRAVENOUS CONTINUOUS
Status: DISCONTINUED | OUTPATIENT
Start: 2017-05-15 | End: 2017-05-15

## 2017-05-15 RX ORDER — PIOGLITAZONEHYDROCHLORIDE 15 MG/1
15 TABLET ORAL DAILY
Status: DISCONTINUED | OUTPATIENT
Start: 2017-05-16 | End: 2017-05-18 | Stop reason: HOSPADM

## 2017-05-15 RX ADMIN — ENOXAPARIN SODIUM 40 MG: 100 INJECTION SUBCUTANEOUS at 05:05

## 2017-05-15 RX ADMIN — INSULIN ASPART 3 UNITS: 100 INJECTION, SOLUTION INTRAVENOUS; SUBCUTANEOUS at 05:05

## 2017-05-15 RX ADMIN — FAMOTIDINE 20 MG: 20 TABLET, FILM COATED ORAL at 07:05

## 2017-05-15 RX ADMIN — ALBUTEROL SULFATE 2.5 MG: 2.5 SOLUTION RESPIRATORY (INHALATION) at 09:05

## 2017-05-15 RX ADMIN — FUROSEMIDE 20 MG: 10 INJECTION, SOLUTION INTRAMUSCULAR; INTRAVENOUS at 11:05

## 2017-05-15 NOTE — PROGRESS NOTES
Pt received at 1030 to ICU. Pt hooked up to monitor and tele. Pt tachypnic and reports SOB. On 3L NC. O2 sats adequate. Pts lung sounds course with crackles. Pt has history of CHF. States she did not take her AM dose of lasix (due to pre-op orders). Dr. Perla called and notified. 20mg lasix IFP ordered and given. Pt denies any pain or chest pain at this time. Pt oriented to room. Daughter (Brittney) called and updated per pt request. Will continue to monitor closely.

## 2017-05-15 NOTE — PROGRESS NOTES
Dr. Coombs notified in elevation in troponin and d-dimer. No new orders at this time. Will continue to monitor. Pt denies any chest pain.

## 2017-05-15 NOTE — OR NURSING
c/o sob; wheezing noted ; 4 liter nasal canula applied; Albuterol aerosol tx given; Dr Perla here; surgery cancelled; 12 Lead EKG done

## 2017-05-15 NOTE — CONSULTS
Dictation #1  MRN:[id number]  CSN:[id number]  [id number]  Dyspnea / hypoxemia  Dm  ?mild htn induced pulm edema  htn

## 2017-05-15 NOTE — PLAN OF CARE
Patient prefers to have Brittney (daughter) present for discharge teaching. Please contact them @[phone].

## 2017-05-15 NOTE — IP AVS SNAPSHOT
[facility] Location ([facility])  [address] [place] [address]  Phone: [phone]           Patient Discharge Instructions   Our goal is to set you up for success. This packet includes information on your condition, medications, and your home care.  It will help you care for yourself to prevent having to return to the hospital.     Please ask your nurse if you have any questions.      There are many details to remember when preparing to leave the hospital. Here is what you will need to do:    1. Take your medicine. If you are prescribed medications, review your Medication List on the following pages. You may have new medications to  at the pharmacy and others that you'll need to stop taking. Review the instructions for how and when to take your medications. Talk with your doctor or nurses if you are unsure of what to do.     2. Go to your follow-up appointments. Specific follow-up information is listed in the following pages. Your may be contacted by a nurse or clinical provider about future appointments. Be sure we have all of the phone numbers to reach you. Please contact your provider's office if you are unable to make an appointment.     3. Watch for warning signs. Your doctor or nurse will give you detailed warning signs to watch for and when to call for assistance. These instructions may also include educational information about your condition. If you experience any of warning signs to your health, call your doctor.               ** Verify the list of medication(s) below is accurate and up to date. Carry this with you in case of emergency. If your medications have changed, please notify your healthcare provider.             Medication List      CONTINUE taking these medications        Additional Info                      ALEVE ORAL   Refills:  0   Dose:  1 tablet    Instructions:  Take 1 tablet by mouth as needed.     Begin Date    AM    Noon    PM    Bedtime       COCO ROQUE  INHL   Refills:  0    Instructions:  Inhale into the lungs once daily.     Begin Date    AM    Noon    PM    Bedtime       aspirin 81 MG EC tablet   Commonly known as:  ECOTRIN   Quantity:  90 tablet   Refills:  3   Dose:  81 mg    Last time this was given:  81 mg on 5/18/2017  8:15 AM   Instructions:  Take 1 tablet (81 mg total) by mouth once daily.     Begin Date    AM    Noon    PM    Bedtime       atorvastatin 20 MG tablet   Commonly known as:  LIPITOR   Quantity:  90 tablet   Refills:  3   Dose:  20 mg    Last time this was given:  20 mg on 5/18/2017  8:14 AM   Instructions:  Take 1 tablet (20 mg total) by mouth once daily.     Begin Date    AM    Noon    PM    Bedtime       benazepril 20 MG tablet   Commonly known as:  LOTENSIN   Quantity:  90 tablet   Refills:  3   Dose:  20 mg    Last time this was given:  20 mg on 5/18/2017  8:15 AM   Instructions:  Take 1 tablet (20 mg total) by mouth once daily.     Begin Date    AM    Noon    PM    Bedtime       FISH OIL ORAL   Refills:  0   Dose:  1200 mg    Instructions:  Take 1,200 mg by mouth.     Begin Date    AM    Noon    PM    Bedtime       furosemide 20 MG tablet   Commonly known as:  LASIX   Quantity:  90 tablet   Refills:  0    Last time this was given:  20 mg on 5/18/2017  8:14 AM   Instructions:  TAKE 1 TABLET BY MOUTH EVERY DAY     Begin Date    AM    Noon    PM    Bedtime       glipiZIDE 5 MG Tr24   Commonly known as:  GLUCOTROL   Refills:  4      Begin Date    AM    Noon    PM    Bedtime       IMODIUM A-D ORAL   Refills:  0   Dose:  1 tablet    Instructions:  Take 1 tablet by mouth as needed.     Begin Date    AM    Noon    PM    Bedtime       LINZESS ORAL   Refills:  0   Dose:  72 mcg    Instructions:  Take 72 mcg by mouth once daily.     Begin Date    AM    Noon    PM    Bedtime       metoprolol succinate 100 MG 24 hr tablet   Commonly known as:  TOPROL-XL   Quantity:  90 tablet   Refills:  0    Last time this was given:  100 mg on 5/18/2017  8:14 AM    Instructions:  TAKE 1 TABLET BY MOUTH EVERY DAY     Begin Date    AM    Noon    PM    Bedtime       pantoprazole 40 MG tablet   Commonly known as:  PROTONIX   Refills:  0   Dose:  40 mg    Last time this was given:  40 mg on 5/18/2017  8:15 AM   Instructions:  Take 40 mg by mouth once daily.     Begin Date    AM    Noon    PM    Bedtime       pioglitazone 15 MG tablet   Commonly known as:  ACTOS   Refills:  0   Dose:  15 mg    Last time this was given:  15 mg on 5/18/2017  8:16 AM   Instructions:  Take 15 mg by mouth once daily.     Begin Date    AM    Noon    PM    Bedtime       pioglitazone-metformin  mg per tablet   Commonly known as:  ACTOPLUS MET   Refills:  0      Begin Date    AM    Noon    PM    Bedtime       vitamin D 1000 units Tab   Refills:  0   Dose:  185 mg    Instructions:  Take 185 mg by mouth once daily.     Begin Date    AM    Noon    PM    Bedtime                  Please bring to all follow up appointments:    1. A copy of your discharge instructions.  2. All medicines you are currently taking in their original bottles.  3. Identification and insurance card.    Please arrive 15 minutes ahead of scheduled appointment time.    Please call 24 hours in advance if you must reschedule your appointment and/or time.        Your Scheduled Appointments     Sep 14, 2017 12:15 PM CDT   Established Patient Visit with Garrett Calderon MD   [facility]    [address], [address]  [facility] [address] [phone]              Follow-up Information     Follow up with Burke Perla MD.    Specialty:  General Surgery    Contact information:    [address] [facility] [address] [phone]          Discharge Instructions     Future Orders    Activity as tolerated     Call MD for:  redness, tenderness, or signs of infection (pain, swelling, redness, odor or green/yellow discharge around incision site)     Diet general     Questions:    Total calories:    "   Fat restriction, if any:      Protein restriction, if any:      Na restriction, if any:      Fluid restriction:      Additional restrictions:        Discharge References/Attachments     GALLSTONES, WHAT ARE (ENGLISH)    GALLSTONES, DISCHARGE INSTRUCTIONS (ENGLISH)    CHOLECYSTECTOMY (ENGLISH)        Primary Diagnosis     Your primary diagnosis was:  Calculus Of Gallbladder With Acute Cholecystitis      Admission Information     Date & Time Provider Department CSN    5/15/2017  5:53 AM Burke Perla MD [facility] [id number]      Care Providers     Provider Role Specialty Primary office phone    Burke Perla MD Attending Provider General Surgery [phone]    Burke Perla MD Surgeon  General Surgery [phone]    Jarek Wade III, MD Consulting Physician  Pulmonary Disease [phone]    Garrett Calderon MD Consulting Physician  Cardiology [phone]      Your Vitals Were     BP Pulse Temp Resp Height Weight    108/58 (BP Location: Right arm, Patient Position: Lying, BP Method: Automatic) 68 98.6 °F (37 °C) (Oral) 20 5' 3" (1.6 m) 59 kg (130 lb)    SpO2 BMI             100% 23.03 kg/m2         Recent Lab Values        5/15/2017                           5:36 PM           A1C 8.4 (H)           Comment for A1C at  5:36 PM on 5/15/2017:  According to ADA guidelines, hemoglobin A1C <7.0% represents  optimal control in non-pregnant diabetic patients.  Different  metrics may apply to specific populations.   Standards of Medical Care in Diabetes - 2016.  For the purpose of screening for the presence of diabetes:  <5.7%     Consistent with the absence of diabetes  5.7-6.4%  Consistent with increasing risk for diabetes   (prediabetes)  >or=6.5%  Consistent with diabetes  Currently no consensus exists for use of hemoglobin A1C  for diagnosis of diabetes for children.        Pending Labs     Order Current Status    Specimen to Pathology - Surgery In process      Allergies " as of 5/18/2017        Reactions    Codeine Nausea And Vomiting    States can take oxycodone and hydrocodone    Sulfa (Sulfonamide Antibiotics) Nausea And Vomiting      [facility]     [facility] Nurse Care Line - 24/7 Assistance  Unless otherwise directed by your provider, please contact [facility], our nurse care line that is available for 24/7 assistance.     Registered nurses in the [facility] provide clinical advisement, health education, appointment booking, and other advisory services.  Call for this free service at [phone].        Advance Directives     An advance directive is a document which, in the event you are no longer able to make decisions for yourself, tells your healthcare team what kind of treatment you do or do not want to receive, or who you would like to make those decisions for you.  If you do not currently have an advance directive, [facility] encourages you to create one.  For more information call:  [phone]), [phone] ([phone]),  or log on to [email or web address].        Language Assistance Services     ATTENTION: Language assistance services are available, free of charge. Please call [phone].      ATENCIÓN: Si habla español, tiene a zuluaga disposición servicios gratuitos de asistencia lingüística. Llame al [phone].     CHÚ Ý: N?u b?n nói Ti?ng Vi?t, có các d?ch v? h? tr? ngôn ng? mi?n phí dành cho b?n. G?i s? [phone].        Heart Failure Education       Heart Failure: Being Active  You have a condition called heart failure. Being active doesnt mean that you have to wear yourself out. Even a little movement each day helps to strengthen your heart. If you cant get out to exercise, you can do simple stretching and strengthening exercises at home. These are good ways to keep you well-conditioned and prevent you and your heart from becoming excessively weak.    Ideas to get you started  · Add a little movement to  things you do now. Walk to mail letters. Park your car at the far end of the parking lot and walk to the store. Walk up a flight of stairs instead of taking the elevator.  · Choose activities you enjoy. You might walk, swim, or ride an exercise bike. Things like gardening and washing the car count, too. Other possibilities include: washing dishes, walking the dog, walking around the mall, and doing aerobic activities with friends.  · Join a group exercise program at a [facility] or [facility], a senior center, or a community center. Or look into a hospital cardiac rehabilitation program. Ask your doctor if you qualify.  Tips to keep you going  · Get up and get dressed each day. Go to a coffee shop and read a newspaper or go somewhere that you'll be in the presence of other active people. Youll feel more like being active.  · Make a plan. Choose one or more activities that you enjoy and that you can easily do. Then plan to do at least one each day. You might write your plan on a calendar.  · Go with a friend or a group if you like company. This can help you feel supported and stay motivated, too.  · Plan social events that you enjoy. This will keep you mentally engaged as well as physically motivated to do things you find pleasure in.  For your safety  · Talk with your healthcare provider before starting an exercise program.  · Exercise indoors when its too hot or too cold outside, or when the air quality is poor. Try walking at a shopping mall.  · Wear socks and sturdy shoes to maintain your balance and prevent falls.  · Start slowly. Do a few minutes several times a day at first. Increase your time and speed little by little.  · Stop and rest whenever you feel tired or get short of breath.  · Dont push yourself on days when you dont feel well.  Date Last Reviewed: 3/20/2016  © 8732-2649 [employer]. [address] [place] [address], [place] [address]. All rights reserved. This information is not intended as a  substitute for professional medical care. Always follow your healthcare professional's instructions.              Heart Failure: Evaluating Your Heart  You have a condition called heart failure. To evaluate your condition, your doctor will examine you, ask questions, and do some tests. Along with looking for signs of heart failure, the doctor looks for any other health problems that may have led to heart failure. The results of your evaluation will help your doctor form a treatment plan.  Health history and physical exam  Your visit will start with a health history. Tell the doctor about any symptoms youve noticed and about all medicines you take. Then youll have a physical exam. This includes listening to your heartbeat and breathing. Youll also be checked for swelling (edema) in your legs and neck. When you have fluid buildup or fluid in the lungs, it may be called congestive heart failure.  Diagnosing heart failure     During an echocardiogram, sound waves bounce off the heart. These are converted into a picture on the screen.   The following may be done to help your doctor form a diagnosis:  · X-rays show the size and shape of your heart. These pictures can also show fluid in your lungs.  · An electrocardiogram (ECG or EKG) shows the pattern of your heartbeat. Small pads (electrodes) are placed on your chest, arms, and legs. Wires connect the pads to the ECG machine, which records your hearts electrical signals. This can give the doctor information about heart function.  · An echocardiogram uses ultrasound waves to show the structure and movement of your heart muscle. This shows how well the heart pumps. It also shows the thickness of the heart walls, and if the heart is enlarged. It is one of the most useful, non-invasive tests as it provides information about the heart's general function. This helps your doctor make treatment decisions.  · Lab tests evaluate small amounts of blood or urine for signs of  problems. A BNP lab test can help diagnose and evaluate heart failure. BNP stands for B-type natriuretic peptide. The ventricles secrete more BNP when heart failure worsens. Lab tests can also provide information about metabolic dysfunction or heart dysfunction.  Your treatment plan  Based on the results of your evaluation and tests, your doctor will develop a treatment plan. This plan is designed to relieve some of your heart failure symptoms and help make you more comfortable. Your treatment plan may include:  · Medicine to help your heart work better and improve your quality of life  · Changes in what you eat and drink to help prevent fluid from backing up in your body  · Daily monitoring of your weight and heart failure symptoms to see how well your treatment plan is working  · Exercise to help you stay healthy  · Help with quitting smoking  · Emotional and psychological support to help adjust to the changes  · Referrals to other specialists to make sure you are being treated comprehensively  Date Last Reviewed: 3/21/2016  © 9884-8247 [employer]. [address] [place] [address]. All rights reserved. This information is not intended as a substitute for professional medical care. Always follow your healthcare professional's instructions.              Heart Failure: Making Changes to Your Diet  You have a condition called heart failure. When you have heart failure, excess fluid is more likely to build up in your body because your heart isn't working well. This makes the heart work harder to pump blood. Fluid buildup causes symptoms such as shortness of breath and swelling (edema). This is often referred to as congestive heart failure or CHF. Controlling the amount of salt (sodium) you eat may help stop fluid from building up. Your doctor may also tell you to reduce the amount of fluid you drink.  Reading food labels    Your healthcare provider will tell you how much sodium you can eat each  day. Read food labels to keep track. Keep in mind that certain foods are high in salt. These include canned, frozen, and processed foods. Check the amount of sodium in each serving. Watch out for high-sodium ingredients. These include MSG (monosodium glutamate), baking soda, and sodium phosphate.   Eating less salt  Give yourself time to get used to eating less salt. It may take a little while. Here are some tips to help:  · Take the saltshaker off the table. Replace it with salt-free herb mixes and spices.  · Eat fresh or plain frozen vegetables. These have much less salt than canned vegetables.  · Choose low-sodium snacks like sodium-free pretzels, crackers, or air-popped popcorn.  · Dont add salt to your food when youre cooking. Instead, season your foods with pepper, lemon, garlic, or onion.  · When you eat out, ask that your food be cooked without added salt.  · Avoid eating fried foods as these often have a great deal of salt.  If youre told to limit fluids  You may need to limit how much fluid you have to help prevent swelling. This includes anything that is liquid at room temperature, such as ice cream and soup. If your doctor tells you to limit fluid, try these tips:  · Measure drinks in a measuring cup before you drink them. This will help you meet daily goals.  · Chill drinks to make them more refreshing.  · Suck on frozen lemon wedges to quench thirst.  · Only drink when youre thirsty.  · Chew sugarless gum or suck on hard candy to keep your mouth moist.  · Weigh yourself daily to know if your body's fluid content is rising.  My sodium goal  Your healthcare provider may give you a sodium goal to meet each day. This includes sodium found in food as well as salt that you add. My goal is to eat no more than ___________ mg of sodium per day.     When to call your doctor  Call your doctor right away if you have any symptoms of worsening heart failure. These can include:  · Sudden weight gain  · Increased  swelling of your legs or ankles  · Trouble breathing when youre resting or at night  · Increase in the number of pillows you have to sleep on  · Chest pain, pressure, discomfort, or pain in the jaw, neck, or back   Date Last Reviewed: 3/21/2016  © 1025-6785 [employer]. [address] [place] [address]. All rights reserved. This information is not intended as a substitute for professional medical care. Always follow your healthcare professional's instructions.              Heart Failure: Medicines to Help Your Heart    You have a condition called heart failure (also known as congestive heart failure, or CHF). Your doctor will likely prescribe medicines for heart failure and any underlying health problems you have. Most heart failure patients take one or more types of medicinen. Your healthcare provider will work to find the combination of medicines that works best for you.  Heart failure medicines  Here are the most common heart failure medicines:  · ACE inhibitors lower blood pressure and decrease strain on the heart. This makes it easier for the heart to pump. Angiotensin receptor blockers have similar effects. These are prescribed for some patients instead of ACE inhibitors.  · Beta-blockers relieve stress on the heart. They also improve symptoms. They may also improve the heart's pumping action over time.  · Diuretics (also called water pills) help rid your body of excess water. This can help rid your body of swelling (edema). Having less fluid to pump means your heart doesnt have to work as hard. Some diuretics make your body lose a mineral called potassium. Your doctor will tell you if you need to take supplements or eat more foods high in potassium.  · Digoxin helps your heart pump with more strength. This helps your heart pump more blood with each beat. So, more oxygen-rich blood travels to the rest of the body.  · Aldosterone antagonists help alter hormones and decrease strain on  the heart.  · Hydralazine and nitrates are two separate medicines used together to treat heart failure. They may come in one combination pill. They lower blood pressure and decrease how hard the heart has to pump.  Medicines for related conditions  Controlling other heart problems helps keep heart failure under control, too. Depending on other heart problems you have, medicines may be prescribed to:  · Lower blood pressure (antihypertensives).  · Lower cholesterol levels (statins).  · Prevent blood clots (anticoagulants or aspirin).  · Keep the heartbeat steady (antiarrhythmics).  Date Last Reviewed: 3/5/2016  © 6068-2930 [employer]. [address] [place] [address], [place] [address]. All rights reserved. This information is not intended as a substitute for professional medical care. Always follow your healthcare professional's instructions.              Heart Failure: Procedures That May Help    The heart is a muscle that pumps oxygen-rich blood to all parts of the body. When you have heart failure, the heart is not able to pump as well as it should. Blood and fluid may back up into the lungs (congestive heart failure), and some parts of the body dont get enough oxygen-rich blood to work normally. These problems lead to the symptoms of heart failure.     Certain procedures may help the heart pump better in some cases of heart failure. Some procedures are done to treat health problems that may have caused the heart failure such as coronary artery disease or heart rhythm problems. For more serious heart failure, other options are available.  Treating artery and valve problems  If you have coronary artery disease or valve disease, procedures may be done to improve blood flow. This helps the heart pump better, which can improve heart failure symptoms. First, your doctor may do a cardiac catheterization to help detect clogged blood vessels or valve damage. During this procedure, a  thin tube (catheter) in  inserted into a blood vessel and guided to the heart. There a dye is injected and a special type of X-ray (angiogram) is taken of the blood vessels. Procedures to open a blocked artery or fix damaged valves can also be done using catheterization.  · Angioplasty uses a balloon-tipped instrument at the end of the catheter. The balloon is inflated to widen the narrowed artery. In many cases, a stent is expanded to further support the narrowed artery. A stent is a metal mesh tube.  · Valve surgery repairs or replacement of faulty valves can also be done during catheterization so blood can flow properly through the chambers of the heart.  Bypass surgery is another option to help treat blocked arteries. It uses a healthy blood vessel from elsewhere in the body. The healthy blood vessel is attached above and below the blocked area so that blood can flow around the blocked artery.  Treating heart rhythm problems  A device may be placed in the chest to help a weak heart maintain a healthy, heartbeat so the heart can pump more effectively:  · Pacemaker. A pacemaker is an implanted device that regulates your heartbeat electronically. It monitors your heart's rhythm and generates a painless electric impulse that helps the heart beat in a regular rhythm. A pacemaker is programmed to meet your specific heart rhythm needs.  · Biventricular pacing/cardiac resynchronization therapy. A type of pacemaker that paces both pumping chambers of the heart at the same time to coordinate contractions and to improve the heart's function. Some people with heart failure are candidates for this therapy.  · Implantable cardioverter defibrillator. A device similar to a pacemaker that senses when the heart is beating too fast and delivers an electrical shock to convert the fast rhythm to a normal rhythm. This can be a life saving device.  In severe cases  In more serious cases of heart failure when other treatments no longer work, other options may  include:  · Ventricular assist devices (VADs). These are mechanical devices used to take over the pumping function for one or both of the heart's ventricles, or pumping chambers. A VAD may be necessary when heart failure progresses to the point that medicines and other treatments no longer help. In some cases, a VAD may be used as a bridge to transplant.  · Heart transplant. This is replacing the diseased heart with a healthy one from a donor. This is an option for a few people who are very sick. A heart transplant is very serious and not an option for all patients. Your doctor can tell you more.  Date Last Reviewed: 3/20/2016  © 0060-5734 [employer]. [address] [place] [address], [place] [address]. All rights reserved. This information is not intended as a substitute for professional medical care. Always follow your healthcare professional's instructions.              Heart Failure: Tracking Your Weight  You have a condition called heart failure. When you have heart failure, a sudden weight gain or a steady rise in weight is a warning sign that your body is retaining too much water and salt. This could mean your heart failure is getting worse. If left untreated, it can cause problems for your lungs and result in shortness of breath. Weighing yourself each day is the best way to know if youre retaining water. If your weight goes up quickly, call your doctor. You will be given instructions on how to get rid of the excess water. You will likely need medicines and to avoid salt. This will help your heart work better.  Call your doctor if you gain more than 2 pounds in 1 day, more than 5 pounds in 1 week, or whatever weight gain you were told to report by your doctor. This is often a sign of worsening heart failure and needs to be evaluated and treated. Your doctor will tell you what to do next.   Tips for weighing yourself    · Weigh yourself at the same time each morning, wearing the same clothes. Weigh  yourself after urinating and before eating.  · Use the same scale each day. Make sure the numbers are easy to read. Put the scale on a flat, hard surface -- not on a rug or carpet.  · Do not stop weighing yourself. If you forget one day, weigh again the next morning.  How to use your weight chart  · Keep your weight chart near the scale. Write your weight on the chart as soon as you get off the scale.  · Fill in the month and the start date on the chart. Then write down your weight each day. Your chart will look like this:    · If you miss a day, leave the space blank. Weigh yourself the next day and write your weight in the next space.  · Take your weight chart with you when you go to see your doctor.  Date Last Reviewed: 3/20/2016  © 5296-2091 [employer]. [address] [place] [address]. All rights reserved. This information is not intended as a substitute for professional medical care. Always follow your healthcare professional's instructions.              Heart Failure: Warning Signs of a Flare-Up  You have a condition called heart failure. Once you have heart failure, flare-ups can happen. Below are signs that can mean your heart failure is getting worse. If you notice any of these warning signs, call your healthcare provider.  Swelling    · Your feet, ankles, or lower legs get puffier.  · You notice skin changes on your lower legs.  · Your shoes feel too tight.  · Your clothes are tighter in the waist.  · You have trouble getting rings on or off your fingers.  Shortness of breath  · You have to breathe harder even when youre doing your normal activities or when youre resting.  · You are short of breath walking up stairs or even short distances.  · You wake up at night short of breath or coughing.  · You need to use more pillows or sit up to sleep.  · You wake up tired or restless.  Other warning signs  · You feel weaker, dizzy, or more tired.  · You have chest pain or changes in your  heartbeat.  · You have a cough that wont go away.  · You cant remember things or dont feel like eating.  Tracking your weight  Gaining weight is often the first warning sign that heart failure is getting worse. Gaining even a few pounds can be a sign that your body is retaining excess water and salt. Weighing yourself each day in the morning after you urinate and before you eat, is the best way to know if you're retaining water. Get a scale that is easy to read and make sure you wear the same clothes and use the same scale every time you weigh. Your healthcare provider will show you how to track your weight. Call your doctor if you gain more than 2 pounds in 1 day, 5 pounds in 1 week, or whatever weight gain you were told to report by your doctor. This is often a sign of worsening heart failure and needs to be evaluated and treated before it compromises your breathing. Your doctor will tell you what to do next.    Date Last Reviewed: 3/15/2016  © 3917-3117 [employer]. [address] [place] [address]. All rights reserved. This information is not intended as a substitute for professional medical care. Always follow your healthcare professional's instructions.              Chronic Kindey Disease Education             Diabetes Discharge Instructions                                   [facility] Sign-Up     Activating your [facility] account is as easy as 1-2-3!     1) Visit [email or web address], select Sign Up Now, enter this activation code and your date of birth, then select Next.  [email or web address]  Expires: 6/23/2017  8:58 AM      2) Create a username and password to use when you visit [facility] in the future and select a security question in case you lose your password and select Next.    3) Enter your e-mail address and click Sign Up!    Additional Information  If you have questions, please e-mail [email or web address] or call [phone] to talk to our [facility] staff. Remember, [facility] is  NOT to be used for urgent needs. For medical emergencies, dial 911.          [facility] complies with applicable Federal civil rights laws and does not discriminate on the basis of race, color, national origin, age, disability, or sex.

## 2017-05-15 NOTE — INTERVAL H&P NOTE
The patient has been examined and the H&P has been reviewed:    I concur with the findings and no changes have occurred since H&P was written.    Anesthesia/Surgery risks, benefits and alternative options discussed and understood by patient/family.          Active Hospital Problems    Diagnosis  POA    Calculus of gallbladder with acute cholecystitis without obstruction [K80.00]  Yes      Resolved Hospital Problems    Diagnosis Date Resolved POA   No resolved problems to display.

## 2017-05-15 NOTE — PLAN OF CARE
Problem: Patient Care Overview  Goal: Plan of Care Review  Outcome: Ongoing (interventions implemented as appropriate)  Pts VSS. Pt weaned to RA. States her SOB is improved after lasix. Pt voided 4 times after receiving lasix. Pt denies any pain. Will continue to monitor closely.

## 2017-05-15 NOTE — OR NURSING
Transferred to PACU for continued care and wait for room assignment; report to Diamond Salvador RN

## 2017-05-16 LAB
ANION GAP SERPL CALC-SCNC: 10 MMOL/L
BASOPHILS # BLD AUTO: 0.02 K/UL
BASOPHILS NFR BLD: 0.2 %
BUN SERPL-MCNC: 17 MG/DL
CALCIUM SERPL-MCNC: 9.3 MG/DL
CHLORIDE SERPL-SCNC: 107 MMOL/L
CO2 SERPL-SCNC: 25 MMOL/L
CREAT SERPL-MCNC: 1.2 MG/DL
DIASTOLIC DYSFUNCTION: NO
DIASTOLIC DYSFUNCTION: YES
DIFFERENTIAL METHOD: ABNORMAL
EOSINOPHIL # BLD AUTO: 0.2 K/UL
EOSINOPHIL NFR BLD: 2.6 %
ERYTHROCYTE [DISTWIDTH] IN BLOOD BY AUTOMATED COUNT: 15.1 %
EST. GFR  (AFRICAN AMERICAN): 51 ML/MIN/1.73 M^2
EST. GFR  (NON AFRICAN AMERICAN): 44 ML/MIN/1.73 M^2
ESTIMATED AVG GLUCOSE: 194 MG/DL
ESTIMATED PA SYSTOLIC PRESSURE: 34.11
GLUCOSE SERPL-MCNC: 135 MG/DL
HBA1C MFR BLD HPLC: 8.4 %
HCT VFR BLD AUTO: 34.2 %
HGB BLD-MCNC: 11.3 G/DL
LYMPHOCYTES # BLD AUTO: 2.6 K/UL
LYMPHOCYTES NFR BLD: 28.6 %
MCH RBC QN AUTO: 30.4 PG
MCHC RBC AUTO-ENTMCNC: 33 %
MCV RBC AUTO: 92 FL
MITRAL VALVE REGURGITATION: ABNORMAL
MONOCYTES # BLD AUTO: 0.7 K/UL
MONOCYTES NFR BLD: 7.8 %
NEUTROPHILS # BLD AUTO: 5.5 K/UL
NEUTROPHILS NFR BLD: 60.5 %
PLATELET # BLD AUTO: 156 K/UL
PMV BLD AUTO: 13.8 FL
POCT GLUCOSE: 157 MG/DL (ref 70–110)
POCT GLUCOSE: 158 MG/DL (ref 70–110)
POCT GLUCOSE: 164 MG/DL (ref 70–110)
POCT GLUCOSE: 170 MG/DL (ref 70–110)
POCT GLUCOSE: 187 MG/DL (ref 70–110)
POTASSIUM SERPL-SCNC: 4 MMOL/L
RBC # BLD AUTO: 3.72 M/UL
RETIRED EF AND QEF - SEE NOTES: 76 (ref 55–65)
SODIUM SERPL-SCNC: 142 MMOL/L
TRICUSPID VALVE REGURGITATION: ABNORMAL
TROPONIN I SERPL DL<=0.01 NG/ML-MCNC: 0.13 NG/ML
WBC # BLD AUTO: 9.11 K/UL

## 2017-05-16 PROCEDURE — 63600175 PHARM REV CODE 636 W HCPCS: Performed by: SPECIALIST

## 2017-05-16 PROCEDURE — 80048 BASIC METABOLIC PNL TOTAL CA: CPT

## 2017-05-16 PROCEDURE — 99900035 HC TECH TIME PER 15 MIN (STAT)

## 2017-05-16 PROCEDURE — 93005 ELECTROCARDIOGRAM TRACING: CPT

## 2017-05-16 PROCEDURE — 93306 TTE W/DOPPLER COMPLETE: CPT

## 2017-05-16 PROCEDURE — 94640 AIRWAY INHALATION TREATMENT: CPT

## 2017-05-16 PROCEDURE — 25000003 PHARM REV CODE 250: Performed by: SPECIALIST

## 2017-05-16 PROCEDURE — 93010 ELECTROCARDIOGRAM REPORT: CPT | Mod: ,,, | Performed by: INTERNAL MEDICINE

## 2017-05-16 PROCEDURE — 93017 CV STRESS TEST TRACING ONLY: CPT

## 2017-05-16 PROCEDURE — 36415 COLL VENOUS BLD VENIPUNCTURE: CPT

## 2017-05-16 PROCEDURE — 20000000 HC ICU ROOM

## 2017-05-16 PROCEDURE — 63600175 PHARM REV CODE 636 W HCPCS

## 2017-05-16 PROCEDURE — 84484 ASSAY OF TROPONIN QUANT: CPT

## 2017-05-16 PROCEDURE — 25000003 PHARM REV CODE 250: Performed by: INTERNAL MEDICINE

## 2017-05-16 PROCEDURE — G0378 HOSPITAL OBSERVATION PER HR: HCPCS

## 2017-05-16 PROCEDURE — 85025 COMPLETE CBC W/AUTO DIFF WBC: CPT

## 2017-05-16 RX ORDER — ACETAMINOPHEN 325 MG/1
650 TABLET ORAL EVERY 6 HOURS PRN
Status: DISCONTINUED | OUTPATIENT
Start: 2017-05-16 | End: 2017-05-18 | Stop reason: HOSPADM

## 2017-05-16 RX ADMIN — METOPROLOL SUCCINATE 100 MG: 50 TABLET, EXTENDED RELEASE ORAL at 12:05

## 2017-05-16 RX ADMIN — FUROSEMIDE 20 MG: 20 TABLET ORAL at 12:05

## 2017-05-16 RX ADMIN — ENOXAPARIN SODIUM 40 MG: 100 INJECTION SUBCUTANEOUS at 05:05

## 2017-05-16 RX ADMIN — BENAZEPRIL HYDROCHLORIDE 20 MG: 10 TABLET, FILM COATED ORAL at 12:05

## 2017-05-16 RX ADMIN — PIOGLITAZONE HYDROCHLORIDE 15 MG: 15 TABLET ORAL at 12:05

## 2017-05-16 RX ADMIN — ACETAMINOPHEN 650 MG: 325 TABLET ORAL at 12:05

## 2017-05-16 RX ADMIN — PANTOPRAZOLE SODIUM 40 MG: 40 TABLET, DELAYED RELEASE ORAL at 12:05

## 2017-05-16 RX ADMIN — TIOTROPIUM BROMIDE 18 MCG: 18 CAPSULE ORAL; RESPIRATORY (INHALATION) at 07:05

## 2017-05-16 RX ADMIN — ASPIRIN 81 MG: 81 TABLET, COATED ORAL at 12:05

## 2017-05-16 RX ADMIN — ATORVASTATIN CALCIUM 20 MG: 20 TABLET, FILM COATED ORAL at 12:05

## 2017-05-16 NOTE — PROGRESS NOTES
Patient transported to [facility] with transport and RN; VSS; no SOB, no Chest pain; on tele monitor; NSR noted; denies any needs; denies need to use restroom at this time; all AM medications held per Dr. Coombs's order; pt AAOx3; no distress;

## 2017-05-16 NOTE — PROGRESS NOTES
Pt back from nuclear med; no distress noted; VSS; transported via wheelchair on tele monitor; room air; pt denies any type of pain; complaints of mild shortness of breath during stress test; SOB resolved after study; no distress

## 2017-05-16 NOTE — PLAN OF CARE
Problem: Patient Care Overview  Goal: Plan of Care Review  Outcome: Ongoing (interventions implemented as appropriate)  Patient on room air sat's 99% with no reported distress.Will continue to monitor.

## 2017-05-16 NOTE — H&P
DATE OF ADMIT:  05/15/2017    HISTORY:  This 77-year-old black female presented to the hospital this morning   to have an elective laparoscopic cholecystectomy.  While in holding, she became   short of breath and complained of chest pain.  She was evaluated by Anesthesia   and felt that surgery should be postponed.  She is admitted to the ICU for   evaluation by Dr. Calderon and Dr. Jarek Wade.    PAST MEDICAL HISTORY:  Significant for hypertension, shortness of breath,   chronic renal disease and diabetes.    PHYSICAL EXAMINATION:  GENERAL:  Elderly black female in some distress secondary to shortness of   breath.  HEAD, EARS, EYES, NOSE AND THROAT:  Within normal limits.  NECK:  Supple.  CHEST:  With wheezes and rales.  HEART:  Rate and rhythm are regular.  ABDOMEN:  Soft, nontender.  EXTREMITIES:  No edema.    IMPRESSION:  1.  Shortness of breath.  2.  Chest pain.  3.  Cholelithiasis.    PLAN:  She will be sent to the ICU and be seen by Cardiology and Pulmonary.      EMMIE/  dd: 05/15/2017 16:02:49 (CDT)  td: 05/16/2017 00:49:51 (CDT)  Doc ID   #[id number]  Job ID #[id number]    CC:

## 2017-05-16 NOTE — CONSULTS
HISTORY OF PRESENT ILLNESS:  The patient is a very sweet 77-year-old nonsmoking   female who sounds like she gets bronchitis when she is cold.  She had some mild   congestion over the last couple of weeks, but nothing that sounds truly   infectious or even very symptomatic.  She has had a past history significant for   hypertension and diabetes.  Her home meds included metoprolol and a diuretic.    She is also on aspirin, atorvastatin, glipizide, Linzess, pantoprazole,   Naprosyn, although not recently, Actos 15 mg,  and Anoro.  She denies any   history of asthma or COPD to me.  She saw originally at [facility] Emergency Room   with some nausea and abdominal discomfort.  She was followed by Dr. Sood, who   referred her to Dr. Antione Irwin who picked up on the gallstones on an ultrasound   and she saw Dr. Perla who is to do this elective laparoscopic   cholecystectomy today.  The patient states she did not abuse any salt over the   weekend.  In general, she has been feeling pretty well other than this mild   cough and when it is productive, is pretty clearish phlegm.  She woke up feeling   well this morning, took her benazepril and metoprolol and then came to the   hospital at 6:00.  It sounds like she is feeling okay, but got up, walked to the   bathroom.  When she came back, she was quite tachypneic.  The case was then   canceled and she was treated with diuretics with improvement.  She currently   appears comfortable and is lying in bed with saturations 98% to 99%.  Per the   computer, she carries diagnoses of hypertension, rheumatoid arthritis,   hyperlipidemia, chronic diastolic heart failure, type 2 diabetes, and chronic   kidney disease too.  She reportedly had an EKG done today, which did not show   any acute changes.  I am unable to actually review the EKG in the computer.  She   had a CBC shows a white count 9 and hematocrit of 35.9.  Kidney function is   good and glucose of 259.  BNP was 262.  Troponin  was mildly elevated at 0.4.    PHYSICAL EXAMINATION:  VITAL SIGNS:  Her blood pressure has been variable, but currently in 160   systolic.  NECK:  Veins are markedly distended.  LUNGS:  She has soft crackles posteriorly, but on the left upper lung fields.    Otherwise, she has no kenny wheezing.  CARDIOVASCULAR:  Heart tones are distant.  ABDOMEN:  Soft.    ASSESSMENT:  Acute dyspneic spell today while walking to the bathroom.  It   sounds like it is more noticeable to the health care workers and the patient,   may be this is a chronic pain when she exerts herself.  Her chest x-ray   preoperatively on the 9th was not remarkable.  There is no sign of any fibrosis   from the rheumatoid arthritis with suspected combination of elevated BNP and   mild troponin bump.  Perhaps, she has some hypertensive induced mild pulmonary   edema, which is clearing.  I will order a chest x-ray on her.  She is going to   be seen by the Cardiology Service.  We will do a followup troponin level.      CCS/  dd: 05/15/2017 17:20:27 (CDT)  td: 05/16/2017 02:08:14 (CDT)  Doc ID   #[id number]  Job ID #[id number]    CC: Burke Sood M.D.

## 2017-05-16 NOTE — PROGRESS NOTES
0920 Radioactive injection given by Nuclear med tech; VSS; time out done by nuclear med tech  0940 Procedure scan started; VSS; no chest pain, no SOB

## 2017-05-16 NOTE — PROGRESS NOTES
Nuclear test was negative for reversible ischemia.  Would set her up now for the contemplated surgery.

## 2017-05-16 NOTE — PLAN OF CARE
Problem: Patient Care Overview  Goal: Plan of Care Review  Outcome: Ongoing (interventions implemented as appropriate)  Patient in no apparent distress. Sat's 98  % on room air . Will continue to monitor.

## 2017-05-16 NOTE — CONSULTS
HISTORY OF PRESENT ILLNESS:  Mrs. Oseguera is a 77-year-old lady who presented to   the hospital at 6:00 this morning for a planned laparoscopic cholecystectomy by   Dr. Burke Perla.  She says she got up to use the restroom before surgery   and developed a pressure in the center of her chest together with severe   breathlessness.  She was given a breathing treatment, but was so breathless she   says that it did not help much.  She was then given some intravenous Lasix and   slowly progressively had relief of the chest pressure and breathlessness.  She   is now comfortable.    PAST HISTORY:  She has had hypertension for at least the last 20 years.  She has   had diabetes mellitus since 1991.  She has had rheumatoid arthritis for about   15 years.  She was seen by Dr. Calderon and referred to Dr. Jarek Wade and   earlier this year a pulmonary function study was performed.  They showed small   airway obstruction without significant response to inhalers.  She was prescribed   Anoro and was enrolled in a pulmonary rehab program.  She has never smoked   cigarettes.  An echocardiogram was done in February 2014 and this showed mild   left ventricular hypertrophy, moderate diastolic left ventricular dysfunction   and elevated left ventricular filling pressures with a mildly dilated left   atrium.  A nuclear stress test was also done in February 2014 and this was   reportedly negative for ischemia.    HOME MEDICATIONS:  Prior to this admission included Actos plus metformin, p.r.n.   Naprosyn, vitamin D, atorvastatin, benazepril, furosemide, glipizide, p.r.n.   Imodium, metoprolol succinate, pantoprazole, aspirin, and linaclotide.    FAMILY HISTORY:  She says her father had a heart attack at the age of 65.    PHYSICAL EXAMINATION:  GENERAL:  Reveals an alert, pleasant lady in no apparent acute distress.  VITAL SIGNS:  Blood pressure of 179/80 and a pulse of 76 beats per minute.  HEENT:  The sclerae were nonicteric.  The  conjunctivae were pink.  The ENT exam   was unremarkable.  NECK:  Supple.  There was no jugular venous distention.  The carotid upstroke   was brisk.  There was no carotid bruit.  CHEST:  Revealed a few fine crackles posteriorly.  HEART:  Size was increased with the cardiac apex half an inch outside the   midclavicular line.  There was a prominent left ventricular lift.  S1 and S2   were normal.  There was an audible S4 gallop.  ABDOMEN:  Soft and nontender.  The bowel sounds were normal.  EXTREMITIES:  There was no clubbing, cyanosis or edema of feet.  NEUROLOGIC:  Grossly, the neurological exam was intact.    DIAGNOSTIC STUDY:  The electrocardiogram shows a bifascicular block with a right   bundle-branch block and a left anterior hemiblock.  There was no ST segment   elevation noted throughout the tracing.  There has been no troponin rise.    IMPRESSION:  This sounds like unstable angina in a diabetic, hypertensive lady   with a strong family history of heart disease, with accompanying left   ventricular dysfunction, diastolic.  She also has small airway disease.    RECOMMENDATIONS:  I would recommend repeating a Lexiscan Cardiolite test   tomorrow and another echocardiogram to make sure whether or not she has critical   coronary artery disease before proceeding with laparoscopic gallbladder   surgery.  We will set up the Cardiolite test and the echocardiogram for the   first thing tomorrow morning and discuss with you thereafter.    Thank you for the opportunity of seeing Mrs. Oseguera in consultation.      SAMPSON/  dd: 05/15/2017 17:46:12 (CDT)  td: 05/16/2017 02:39:23 (CDT)  Doc ID   #[id number]  Job ID #[id number]    CC: Jarek Calderon M.D.

## 2017-05-16 NOTE — PLAN OF CARE
Problem: Patient Care Overview  Goal: Plan of Care Review  Outcome: Ongoing (interventions implemented as appropriate)  UNEVENTFUL NIGHT. VSS. NO SOB. BBS CLR.

## 2017-05-17 ENCOUNTER — ANESTHESIA (OUTPATIENT)
Dept: SURGERY | Facility: OTHER | Age: 77
End: 2017-05-17
Payer: MEDICARE

## 2017-05-17 ENCOUNTER — ANESTHESIA EVENT (OUTPATIENT)
Dept: SURGERY | Facility: OTHER | Age: 77
End: 2017-05-17
Payer: MEDICARE

## 2017-05-17 LAB
ALBUMIN SERPL BCP-MCNC: 3.1 G/DL
ALLENS TEST: NORMAL
ALP SERPL-CCNC: 55 U/L
ALT SERPL W/O P-5'-P-CCNC: 12 U/L
ANION GAP SERPL CALC-SCNC: 16 MMOL/L
AST SERPL-CCNC: 18 U/L
BASOPHILS # BLD AUTO: 0.02 K/UL
BASOPHILS NFR BLD: 0.3 %
BILIRUB SERPL-MCNC: 1 MG/DL
BUN SERPL-MCNC: 18 MG/DL
CALCIUM SERPL-MCNC: 9.2 MG/DL
CHLORIDE SERPL-SCNC: 105 MMOL/L
CO2 SERPL-SCNC: 20 MMOL/L
CREAT SERPL-MCNC: 1.2 MG/DL
DIFFERENTIAL METHOD: ABNORMAL
EOSINOPHIL # BLD AUTO: 0.4 K/UL
EOSINOPHIL NFR BLD: 4.7 %
ERYTHROCYTE [DISTWIDTH] IN BLOOD BY AUTOMATED COUNT: 15.1 %
EST. GFR  (AFRICAN AMERICAN): 51 ML/MIN/1.73 M^2
EST. GFR  (NON AFRICAN AMERICAN): 44 ML/MIN/1.73 M^2
GLUCOSE SERPL-MCNC: 138 MG/DL
HCO3 UR-SCNC: 26.1 MMOL/L (ref 24–28)
HCT VFR BLD AUTO: 33.7 %
HGB BLD-MCNC: 11.2 G/DL
LYMPHOCYTES # BLD AUTO: 2.5 K/UL
LYMPHOCYTES NFR BLD: 31.8 %
MCH RBC QN AUTO: 30.6 PG
MCHC RBC AUTO-ENTMCNC: 33.2 %
MCV RBC AUTO: 92 FL
MONOCYTES # BLD AUTO: 0.8 K/UL
MONOCYTES NFR BLD: 9.7 %
NEUTROPHILS # BLD AUTO: 4.2 K/UL
NEUTROPHILS NFR BLD: 53.1 %
PCO2 BLDA: 38.5 MMHG (ref 35–45)
PH SMN: 7.44 [PH] (ref 7.35–7.45)
PLATELET # BLD AUTO: 148 K/UL
PMV BLD AUTO: 13.6 FL
PO2 BLDA: 88 MMHG (ref 80–100)
POC BE: 2 MMOL/L
POC SATURATED O2: 97 % (ref 95–100)
POCT GLUCOSE: 137 MG/DL (ref 70–110)
POCT GLUCOSE: 161 MG/DL (ref 70–110)
POCT GLUCOSE: 172 MG/DL (ref 70–110)
POCT GLUCOSE: 206 MG/DL (ref 70–110)
POTASSIUM SERPL-SCNC: 4.1 MMOL/L
PROT SERPL-MCNC: 6.7 G/DL
RBC # BLD AUTO: 3.66 M/UL
SAMPLE: NORMAL
SITE: NORMAL
SODIUM SERPL-SCNC: 141 MMOL/L
WBC # BLD AUTO: 7.87 K/UL

## 2017-05-17 PROCEDURE — 27000221 HC OXYGEN, UP TO 24 HOURS

## 2017-05-17 PROCEDURE — 85025 COMPLETE CBC W/AUTO DIFF WBC: CPT

## 2017-05-17 PROCEDURE — G0378 HOSPITAL OBSERVATION PER HR: HCPCS

## 2017-05-17 PROCEDURE — 94640 AIRWAY INHALATION TREATMENT: CPT

## 2017-05-17 PROCEDURE — 27201423 OPTIME MED/SURG SUP & DEVICES STERILE SUPPLY: Performed by: SPECIALIST

## 2017-05-17 PROCEDURE — 25000003 PHARM REV CODE 250: Performed by: SPECIALIST

## 2017-05-17 PROCEDURE — 37000009 HC ANESTHESIA EA ADD 15 MINS: Performed by: SPECIALIST

## 2017-05-17 PROCEDURE — 63600175 PHARM REV CODE 636 W HCPCS: Performed by: NURSE ANESTHETIST, CERTIFIED REGISTERED

## 2017-05-17 PROCEDURE — 36000708 HC OR TIME LEV III 1ST 15 MIN: Performed by: SPECIALIST

## 2017-05-17 PROCEDURE — 25000003 PHARM REV CODE 250: Performed by: NURSE ANESTHETIST, CERTIFIED REGISTERED

## 2017-05-17 PROCEDURE — 25000242 PHARM REV CODE 250 ALT 637 W/ HCPCS: Performed by: INTERNAL MEDICINE

## 2017-05-17 PROCEDURE — 99900035 HC TECH TIME PER 15 MIN (STAT)

## 2017-05-17 PROCEDURE — 94799 UNLISTED PULMONARY SVC/PX: CPT

## 2017-05-17 PROCEDURE — 93010 ELECTROCARDIOGRAM REPORT: CPT | Mod: ,,, | Performed by: INTERNAL MEDICINE

## 2017-05-17 PROCEDURE — 63600175 PHARM REV CODE 636 W HCPCS: Performed by: SPECIALIST

## 2017-05-17 PROCEDURE — 36415 COLL VENOUS BLD VENIPUNCTURE: CPT

## 2017-05-17 PROCEDURE — 88304 TISSUE EXAM BY PATHOLOGIST: CPT | Mod: 26,,, | Performed by: PATHOLOGY

## 2017-05-17 PROCEDURE — 37000008 HC ANESTHESIA 1ST 15 MINUTES: Performed by: SPECIALIST

## 2017-05-17 PROCEDURE — 36000709 HC OR TIME LEV III EA ADD 15 MIN: Performed by: SPECIALIST

## 2017-05-17 PROCEDURE — 93005 ELECTROCARDIOGRAM TRACING: CPT

## 2017-05-17 PROCEDURE — 88304 TISSUE EXAM BY PATHOLOGIST: CPT | Performed by: PATHOLOGY

## 2017-05-17 PROCEDURE — 80053 COMPREHEN METABOLIC PANEL: CPT

## 2017-05-17 PROCEDURE — 63600175 PHARM REV CODE 636 W HCPCS: Performed by: ANESTHESIOLOGY

## 2017-05-17 PROCEDURE — 25000003 PHARM REV CODE 250: Performed by: INTERNAL MEDICINE

## 2017-05-17 RX ORDER — ONDANSETRON 2 MG/ML
4 INJECTION INTRAMUSCULAR; INTRAVENOUS ONCE AS NEEDED
Status: DISCONTINUED | OUTPATIENT
Start: 2017-05-17 | End: 2017-05-17

## 2017-05-17 RX ORDER — LIDOCAINE HYDROCHLORIDE 10 MG/ML
INJECTION, SOLUTION INTRAVENOUS
Status: DISCONTINUED | OUTPATIENT
Start: 2017-05-17 | End: 2017-05-17

## 2017-05-17 RX ORDER — NEOSTIGMINE METHYLSULFATE 1 MG/ML
INJECTION, SOLUTION INTRAVENOUS
Status: DISCONTINUED | OUTPATIENT
Start: 2017-05-17 | End: 2017-05-17

## 2017-05-17 RX ORDER — SODIUM CHLORIDE, SODIUM LACTATE, POTASSIUM CHLORIDE, CALCIUM CHLORIDE 600; 310; 30; 20 MG/100ML; MG/100ML; MG/100ML; MG/100ML
INJECTION, SOLUTION INTRAVENOUS CONTINUOUS PRN
Status: DISCONTINUED | OUTPATIENT
Start: 2017-05-17 | End: 2017-05-17

## 2017-05-17 RX ORDER — FENTANYL CITRATE 50 UG/ML
25 INJECTION, SOLUTION INTRAMUSCULAR; INTRAVENOUS EVERY 5 MIN PRN
Status: DISCONTINUED | OUTPATIENT
Start: 2017-05-17 | End: 2017-05-17

## 2017-05-17 RX ORDER — ROCURONIUM BROMIDE 10 MG/ML
INJECTION, SOLUTION INTRAVENOUS
Status: DISCONTINUED | OUTPATIENT
Start: 2017-05-17 | End: 2017-05-17

## 2017-05-17 RX ORDER — FENTANYL CITRATE 50 UG/ML
INJECTION, SOLUTION INTRAMUSCULAR; INTRAVENOUS
Status: DISCONTINUED | OUTPATIENT
Start: 2017-05-17 | End: 2017-05-17

## 2017-05-17 RX ORDER — ONDANSETRON 2 MG/ML
INJECTION INTRAMUSCULAR; INTRAVENOUS
Status: DISCONTINUED | OUTPATIENT
Start: 2017-05-17 | End: 2017-05-17

## 2017-05-17 RX ORDER — SODIUM CHLORIDE 9 MG/ML
INJECTION, SOLUTION INTRAVENOUS CONTINUOUS
Status: DISCONTINUED | OUTPATIENT
Start: 2017-05-17 | End: 2017-05-17

## 2017-05-17 RX ORDER — ETOMIDATE 2 MG/ML
INJECTION INTRAVENOUS
Status: DISCONTINUED | OUTPATIENT
Start: 2017-05-17 | End: 2017-05-17

## 2017-05-17 RX ORDER — MIDAZOLAM HYDROCHLORIDE 1 MG/ML
INJECTION INTRAMUSCULAR; INTRAVENOUS
Status: DISCONTINUED | OUTPATIENT
Start: 2017-05-17 | End: 2017-05-17

## 2017-05-17 RX ORDER — CEFAZOLIN SODIUM 1 G/3ML
INJECTION, POWDER, FOR SOLUTION INTRAMUSCULAR; INTRAVENOUS
Status: DISCONTINUED | OUTPATIENT
Start: 2017-05-17 | End: 2017-05-17

## 2017-05-17 RX ORDER — GLYCOPYRROLATE 0.2 MG/ML
INJECTION INTRAMUSCULAR; INTRAVENOUS
Status: DISCONTINUED | OUTPATIENT
Start: 2017-05-17 | End: 2017-05-17

## 2017-05-17 RX ORDER — LABETALOL HYDROCHLORIDE 5 MG/ML
INJECTION, SOLUTION INTRAVENOUS
Status: DISCONTINUED | OUTPATIENT
Start: 2017-05-17 | End: 2017-05-17

## 2017-05-17 RX ORDER — MEPERIDINE HYDROCHLORIDE 50 MG/ML
12.5 INJECTION INTRAMUSCULAR; INTRAVENOUS; SUBCUTANEOUS ONCE AS NEEDED
Status: DISCONTINUED | OUTPATIENT
Start: 2017-05-17 | End: 2017-05-17

## 2017-05-17 RX ORDER — PROPOFOL 10 MG/ML
VIAL (ML) INTRAVENOUS
Status: DISCONTINUED | OUTPATIENT
Start: 2017-05-17 | End: 2017-05-17

## 2017-05-17 RX ORDER — OXYCODONE HYDROCHLORIDE 5 MG/1
5 TABLET ORAL
Status: DISCONTINUED | OUTPATIENT
Start: 2017-05-17 | End: 2017-05-17

## 2017-05-17 RX ORDER — DEXAMETHASONE SODIUM PHOSPHATE 4 MG/ML
INJECTION, SOLUTION INTRA-ARTICULAR; INTRALESIONAL; INTRAMUSCULAR; INTRAVENOUS; SOFT TISSUE
Status: DISCONTINUED | OUTPATIENT
Start: 2017-05-17 | End: 2017-05-17

## 2017-05-17 RX ORDER — HYDROMORPHONE HYDROCHLORIDE 2 MG/ML
0.4 INJECTION, SOLUTION INTRAMUSCULAR; INTRAVENOUS; SUBCUTANEOUS EVERY 5 MIN PRN
Status: DISCONTINUED | OUTPATIENT
Start: 2017-05-17 | End: 2017-05-17

## 2017-05-17 RX ORDER — SODIUM CHLORIDE 0.9 % (FLUSH) 0.9 %
3 SYRINGE (ML) INJECTION
Status: DISCONTINUED | OUTPATIENT
Start: 2017-05-17 | End: 2017-05-18 | Stop reason: HOSPADM

## 2017-05-17 RX ORDER — ALBUTEROL SULFATE 0.83 MG/ML
2.5 SOLUTION RESPIRATORY (INHALATION) EVERY 4 HOURS PRN
Status: DISCONTINUED | OUTPATIENT
Start: 2017-05-17 | End: 2017-05-18 | Stop reason: HOSPADM

## 2017-05-17 RX ADMIN — ETOMIDATE 18 MG: 2 INJECTION, SOLUTION INTRAVENOUS at 11:05

## 2017-05-17 RX ADMIN — ALBUTEROL SULFATE 2.5 MG: 2.5 SOLUTION RESPIRATORY (INHALATION) at 10:05

## 2017-05-17 RX ADMIN — SODIUM CHLORIDE, SODIUM LACTATE, POTASSIUM CHLORIDE, AND CALCIUM CHLORIDE: 600; 310; 30; 20 INJECTION, SOLUTION INTRAVENOUS at 11:05

## 2017-05-17 RX ADMIN — ATORVASTATIN CALCIUM 20 MG: 20 TABLET, FILM COATED ORAL at 05:05

## 2017-05-17 RX ADMIN — SODIUM CHLORIDE 200 ML: 0.9 INJECTION, SOLUTION INTRAVENOUS at 08:05

## 2017-05-17 RX ADMIN — MIDAZOLAM HYDROCHLORIDE 2 MG: 1 INJECTION, SOLUTION INTRAMUSCULAR; INTRAVENOUS at 11:05

## 2017-05-17 RX ADMIN — PROPOFOL 30 MG: 10 INJECTION, EMULSION INTRAVENOUS at 11:05

## 2017-05-17 RX ADMIN — FUROSEMIDE 20 MG: 20 TABLET ORAL at 05:05

## 2017-05-17 RX ADMIN — HYDROMORPHONE HYDROCHLORIDE 0.4 MG: 2 INJECTION INTRAMUSCULAR; INTRAVENOUS; SUBCUTANEOUS at 01:05

## 2017-05-17 RX ADMIN — HYDROMORPHONE HYDROCHLORIDE 0.4 MG: 2 INJECTION INTRAMUSCULAR; INTRAVENOUS; SUBCUTANEOUS at 02:05

## 2017-05-17 RX ADMIN — LIDOCAINE HYDROCHLORIDE 60 MG: 10 INJECTION, SOLUTION INTRAVENOUS at 11:05

## 2017-05-17 RX ADMIN — LABETALOL HYDROCHLORIDE 10 MG: 5 INJECTION, SOLUTION INTRAVENOUS at 11:05

## 2017-05-17 RX ADMIN — NEOSTIGMINE METHYLSULFATE 5 MG: 1 INJECTION INTRAVENOUS at 11:05

## 2017-05-17 RX ADMIN — ONDANSETRON 4 MG: 2 INJECTION INTRAMUSCULAR; INTRAVENOUS at 05:05

## 2017-05-17 RX ADMIN — INSULIN ASPART 2 UNITS: 100 INJECTION, SOLUTION INTRAVENOUS; SUBCUTANEOUS at 05:05

## 2017-05-17 RX ADMIN — ONDANSETRON 4 MG: 2 INJECTION INTRAMUSCULAR; INTRAVENOUS at 11:05

## 2017-05-17 RX ADMIN — DEXAMETHASONE SODIUM PHOSPHATE 4 MG: 4 INJECTION, SOLUTION INTRAMUSCULAR; INTRAVENOUS at 11:05

## 2017-05-17 RX ADMIN — GLYCOPYRROLATE 0.8 MG: 0.2 INJECTION, SOLUTION INTRAMUSCULAR; INTRAVENOUS at 11:05

## 2017-05-17 RX ADMIN — METOPROLOL SUCCINATE 100 MG: 50 TABLET, EXTENDED RELEASE ORAL at 09:05

## 2017-05-17 RX ADMIN — ROCURONIUM BROMIDE 30 MG: 10 INJECTION, SOLUTION INTRAVENOUS at 11:05

## 2017-05-17 RX ADMIN — FENTANYL CITRATE 100 MCG: 50 INJECTION, SOLUTION INTRAMUSCULAR; INTRAVENOUS at 11:05

## 2017-05-17 RX ADMIN — ALBUTEROL SULFATE 2.5 MG: 2.5 SOLUTION RESPIRATORY (INHALATION) at 01:05

## 2017-05-17 RX ADMIN — TIOTROPIUM BROMIDE 18 MCG: 18 CAPSULE ORAL; RESPIRATORY (INHALATION) at 08:05

## 2017-05-17 RX ADMIN — CEFAZOLIN 2 G: 330 INJECTION, POWDER, FOR SOLUTION INTRAMUSCULAR; INTRAVENOUS at 11:05

## 2017-05-17 RX ADMIN — PANTOPRAZOLE SODIUM 40 MG: 40 TABLET, DELAYED RELEASE ORAL at 05:05

## 2017-05-17 NOTE — PROGRESS NOTES
Progress Note  Pulmonology    Admit Date: 5/15/2017   LOS: 2 days     SUBJECTIVE:   Comfortable this am with sats 99% on room air- no cough or dyspnea-passes stress test-metformin gives her diarrhea-  Continuous Infusions:     Scheduled Meds:   aspirin  81 mg Oral Daily    atorvastatin  20 mg Oral Daily    benazepril  20 mg Oral Daily    enoxparin  40 mg Subcutaneous Q24H    furosemide  20 mg Oral Daily    metoprolol succinate  100 mg Oral Daily    pantoprazole  40 mg Oral Daily    pioglitazone  15 mg Oral Daily    tiotropium  1 capsule Inhalation Daily       Review of Systems:  Constitutional: no fever or chills  Respiratory: no cough or shortness of breath  Cardiovascular: no chest pain or palpitations      OBJECTIVE:     Vital Signs Range (Last 24H):  Temp:  [98 °F (36.7 °C)-98.7 °F (37.1 °C)]   Pulse:  [52-88]   Resp:  [14-28]   BP: ()/(53-97)   SpO2:  [93 %-100 %]     I & O (Last 24H):  Intake/Output Summary (Last 24 hours) at 05/17/17 0714  Last data filed at 05/16/17 1700   Gross per 24 hour   Intake              950 ml   Output              100 ml   Net              850 ml       Physical Exam:  General: no distress  Neck: no jugular venous distention  Lungs:  diminished breath sounds bilaterally  Chest Wall: no tenderness  Heart: regular rate and rhythm  Abdomen: soft, non-tender non-distended; bowel sounds normal  Extremities: no cyanosis or edema, or clubbing  Neurologic: alert, oriented, thought content appropriate    Laboratory:  CBC:   Recent Labs  Lab 05/17/17  0346   WBC 7.87   RBC 3.66*   HGB 11.2*   HCT 33.7*   *   MCV 92   MCH 30.6   MCHC 33.2     CMP:   Recent Labs  Lab 05/17/17  0346   *   CALCIUM 9.2   ALBUMIN 3.1*   PROT 6.7      K 4.1   CO2 20*      BUN 18   CREATININE 1.2   ALKPHOS 55   ALT 12   AST 18   BILITOT 1.0     Coagulation: No results for input(s): INR, APTT in the last 168 hours.    Invalid input(s): PT  ABGs: No results for input(s): PH,  PCO2, PO2, HCO3, POCSATURATED, BE in the last 168 hours.  Microbiology Results (last 7 days)     ** No results found for the last 168 hours. **              Diagnostic Results:  X-Ray: Reviewed    ASSESSMENT/PLAN:   cholelithiasis-for cholecystectomy  r bundle branch block  Diabetes mellitus- low dose actos plus sliding scale-hold metformin as causes diarrhea  Hypertension-metoprolol/benazepril  Copd-will continue spiriva plus prn albuterol- check abg  Deconditioning  Anemia- mild  Case Discussed With:at baseline  Dyspnea from volume overload-minimal bup d dimer/troponinand good response to diuretic -very unlikely to have a pe and dye plus surgery real risk to kidneys  Discharge Needs and Plans: cholecystectomy

## 2017-05-17 NOTE — NURSING
Pt had an uneventful night. No complaints of pain or SOB. Pt ambulated to bedside commode with minimal assistance. EKG/chest xray performed. Pt has been NPO since midnight by her own choice. Vital signs remain stable. EICU notifeid to order morning labs. Will continue to monitor.

## 2017-05-17 NOTE — ANESTHESIA POSTPROCEDURE EVALUATION
"Anesthesia Post Evaluation    Patient: Sandra Oseguera    Procedure(s) Performed: Procedure(s) (LRB):  CHOLECYSTECTOMY-LAPAROSCOPIC (N/A)    Final Anesthesia Type: general  Patient location during evaluation: ICU  Patient participation: Yes- Able to Participate  Level of consciousness: awake and alert  Post-procedure vital signs: reviewed and stable  Pain management: adequate  Airway patency: patent  PONV status at discharge: No PONV  Anesthetic complications: no      Cardiovascular status: stable  Respiratory status: unassisted  Hydration status: euvolemic  Follow-up not needed.        Visit Vitals    BP (!) 178/79    Pulse 72    Temp 36.8 °C (98.2 °F) (Oral)    Resp 13    Ht 5' 3" (1.6 m)    Wt 58.6 kg (129 lb 1.6 oz)    SpO2 100%    Breastfeeding No    BMI 22.87 kg/m2       Pain/Alina Score: Pain Assessment Performed: Yes (5/17/2017  9:27 AM)  Presence of Pain: denies (5/17/2017  7:10 AM)  Pain Rating Prior to Med Admin: 3 (5/16/2017 12:00 PM)  Pain Rating Post Med Admin: 0 (5/16/2017  1:00 PM)      "

## 2017-05-17 NOTE — PROGRESS NOTES
Pt returned from surgery to ICU10 @ 1205. VSS. Pt denies chest pain, SOB or difficulty breathing. Pt denies abdominal pain. Abd lap sites X 4. Abd soft, tender with palpation. Orders received from Dr Perla that pt does not need IVF, may advance diet as tolerated and can tx to MedSurg when room becomes available. Pt & daughter Brittney updated.

## 2017-05-17 NOTE — ANESTHESIA POSTPROCEDURE EVALUATION
"Anesthesia Post Evaluation    Patient: Sandra Oseguera    Procedure(s) Performed: Procedure(s) (LRB):  CHOLECYSTECTOMY-LAPAROSCOPIC (N/A)    Final Anesthesia Type: general  Patient location during evaluation: ICU  Patient participation: Yes- Able to Participate  Level of consciousness: awake and alert  Post-procedure vital signs: reviewed and stable  Pain management: adequate  Airway patency: patent  PONV status at discharge: No PONV  Anesthetic complications: no      Cardiovascular status: hemodynamically stable  Respiratory status: unassisted  Hydration status: euvolemic  Follow-up not needed.        Visit Vitals    BP (!) 178/79    Pulse 72    Temp 36.8 °C (98.2 °F) (Oral)    Resp 13    Ht 5' 3" (1.6 m)    Wt 58.6 kg (129 lb 1.6 oz)    SpO2 100%    Breastfeeding No    BMI 22.87 kg/m2       Pain/Alina Score: Pain Assessment Performed: Yes (5/17/2017  9:27 AM)  Presence of Pain: denies (5/17/2017  7:10 AM)  Pain Rating Prior to Med Admin: 3 (5/16/2017 12:00 PM)  Pain Rating Post Med Admin: 0 (5/16/2017  1:00 PM)      "

## 2017-05-17 NOTE — PLAN OF CARE
Problem: Patient Care Overview  Goal: Plan of Care Review  Outcome: Ongoing (interventions implemented as appropriate)  Patient on room air sat's 100% with no distress reported.  MDI done and IS instructed tolerated well. ABG done. Will continue to monitor.

## 2017-05-17 NOTE — PROGRESS NOTES
Loos and feel well this morning.  Discussed the findings at [facility] and the Echo with Mrs Oseguera.  Would proceed with the lap. Sunshine. Today.    Discussed with Dr Perla.

## 2017-05-17 NOTE — OP NOTE
DATE OF PROCEDURE:  05/17/2017.    PREOPERATIVE DIAGNOSES:  Chronic cholecystitis, cholelithiasis.    POSTOPERATIVE DIAGNOSES:  Chronic cholecystitis, cholelithiasis.    PROCEDURE:  Laparoscopic cholecystectomy.    PROCEDURE IN DETAIL:  The patient was taken to the Operating Room, placed on the   table in supine position.  After smooth induction with general anesthesia, the   abdomen was prepped and draped in the usual sterile fashion.  A subumbilical   incision was made and the Veress needle placed.  The abdomen was easily   insufflated with CO2 to 12 mmHg.  A 10-mm Optiview was advanced and the scope   placed.  Inspection revealed no injury to underlying bowel.  Three additional   5-mm cannulas were placed in the right subcostal area.  The gallbladder was   grasped and retracted superiorly.  Dissection over the lower portion of the   gallbladder revealed a normal appearing cystic duct and artery which were   ligated using the Endoclip and divided.  The gallbladder was then taken down   using electrocautery.  The gallbladder was grasped, placed in an Endobag and   removed through the 10-mm port site.  The liver bed was inspected and no   bleeding noted.  The area was irrigated and the fluid aspirated.  The 10-mm   cannula was then removed.  Under direct vision the fascia was reapproximated   using 2-0 Vicryl.  The 5-mm cannulas were then removed.  The CO2 was allowed to   escape.  The wounds were closed using 4-0 Vicryl followed by glue and   Steri-Strips.  Blood loss was minimal.  The patient tolerated the procedure and   left the Operating Room in stable condition.      TRACI  dd: 05/17/2017 11:46:00 (CDT)  td: 05/17/2017 16:15:14 (VIANCAT)  Doc ID   #[id number]  Job ID #[id number]    CC:

## 2017-05-17 NOTE — NURSING
Dr Wade updated Dr Perla. Dr Perla updated that pt has been NPO since midnight & last lovenox given 5/16 1700. NPO orders received for surgery today.     Dr Wade ordered to hold ASA, Lipitor, Protonix, Actos until post-surgery.     Dr Perla at bedside. States he wants to take pt to surgery for 1000. Spoke with Dr Rider and he said to only give PO metoprolol with a sip of water.

## 2017-05-17 NOTE — ANESTHESIA PREPROCEDURE EVALUATION
05/17/2017  Sandra Oseguera is a 77 y.o., female.    Anesthesia Evaluation    I have reviewed the Patient Summary Reports.    I have reviewed the Nursing Notes.   I have reviewed the Medications.     Review of Systems  Anesthesia Hx:  No problems with previous Anesthesia  Denies Family Hx of Anesthesia complications.   Denies Personal Hx of Anesthesia complications.   Social:  Non-Smoker    Hematology/Oncology:  Hematology Normal   Oncology Normal     Cardiovascular:   Hypertension, well controlled CHF    Pulmonary:   Shortness of breath In pulmonary rehab at [facility]   Renal/:   Chronic Renal Disease, CRI    Hepatic/GI:   GERD, well controlled    Musculoskeletal:   Hx of Right leg sciatica, about 3 yr ago, improved with injections Spine Disorders:    Neurological:  Neurology Normal    Endocrine:   Diabetes, well controlled, type 2        Physical Exam  General:  Well nourished    Airway/Jaw/Neck:  Airway Findings: Mouth Opening: Normal Tongue: Normal  Mallampati: I  TM Distance: Normal, at least 6 cm         Dental:  Dental Findings: Edentulous, Upper Dentures             Anesthesia Plan  Type of Anesthesia, risks & benefits discussed:  Anesthesia Type:  general  Patient's Preference:   Intra-op Monitoring Plan: standard ASA monitors  Intra-op Monitoring Plan Comments:   Post Op Pain Control Plan:   Post Op Pain Control Plan Comments:   Induction:   IV  Beta Blocker:         Informed Consent: Patient understands risks and agrees with Anesthesia plan.  Questions answered. Anesthesia consent signed with patient.  ASA Score: 3     Day of Surgery Review of History & Physical:    H&P update referred to the surgeon.     Anesthesia Plan Notes: Reviewed outside spirometry.  Mild restrictive lung disease without improvement with bronchodilators  Case cx due to abrupt onset of chest pain and shortness of  breath.Sent to ICU for workup.Will re schedule in future.  Pt now optimized s/p asthma exacerbation.        Ready For Surgery From Anesthesia Perspective.

## 2017-05-17 NOTE — TRANSFER OF CARE
"Anesthesia Transfer of Care Note    Patient: Sandra Oseguera    Procedure(s) Performed: Procedure(s) (LRB):  CHOLECYSTECTOMY-LAPAROSCOPIC (N/A)    Patient location: ICU    Anesthesia Type: general    Transport from OR: Transported from OR on 2-3 L/min O2 by NC with adequate spontaneous ventilation    Post pain: adequate analgesia    Post assessment: no apparent anesthetic complications    Post vital signs: stable    Level of consciousness: awake    Nausea/Vomiting: no nausea/vomiting    Complications: none    Transfer of care protocol was followed      Last vitals:   Visit Vitals    BP (!) 178/79    Pulse 72    Temp 36.8 °C (98.2 °F) (Oral)    Resp 13    Ht 5' 3" (1.6 m)    Wt 58.6 kg (129 lb 1.6 oz)    SpO2 100%    Breastfeeding No    BMI 22.87 kg/m2     "

## 2017-05-17 NOTE — BRIEF OP NOTE
[facility]  Brief Operative Note    SUMMARY     Surgery Date: 5/17/2017     Surgeon(s) and Role:     * Burke Perla MD - Primary    Assisting Surgeon: None    Pre-op Diagnosis:  Cholecystitis [K81.9]    Post-op Diagnosis:  Post-Op Diagnosis Codes:     * Cholecystitis [K81.9]    Procedure(s) (LRB):  CHOLECYSTECTOMY-LAPAROSCOPIC (N/A)    Anesthesia: General    Description of Procedure:     Description of the findings of the procedure: Gallstones    Estimated Blood Loss: * No values recorded between 5/17/2017 11:21 AM and 5/17/2017 11:43 AM *min         Specimens:   Specimen (12h ago through future)    Start     Ordered    05/17/17 1138  Specimen to Pathology - Surgery  Once     Comments:  1. Gallbladder    05/17/17 1137

## 2017-05-18 VITALS
SYSTOLIC BLOOD PRESSURE: 108 MMHG | HEIGHT: 63 IN | WEIGHT: 130 LBS | TEMPERATURE: 99 F | DIASTOLIC BLOOD PRESSURE: 58 MMHG | BODY MASS INDEX: 23.04 KG/M2 | OXYGEN SATURATION: 100 % | RESPIRATION RATE: 20 BRPM | HEART RATE: 68 BPM

## 2017-05-18 LAB
POCT GLUCOSE: 176 MG/DL (ref 70–110)
POCT GLUCOSE: 260 MG/DL (ref 70–110)

## 2017-05-18 PROCEDURE — 93010 ELECTROCARDIOGRAM REPORT: CPT | Mod: ,,, | Performed by: INTERNAL MEDICINE

## 2017-05-18 PROCEDURE — 25000003 PHARM REV CODE 250: Performed by: SPECIALIST

## 2017-05-18 PROCEDURE — 94640 AIRWAY INHALATION TREATMENT: CPT

## 2017-05-18 PROCEDURE — 25000003 PHARM REV CODE 250: Performed by: INTERNAL MEDICINE

## 2017-05-18 PROCEDURE — G0378 HOSPITAL OBSERVATION PER HR: HCPCS

## 2017-05-18 PROCEDURE — 93005 ELECTROCARDIOGRAM TRACING: CPT

## 2017-05-18 RX ADMIN — PANTOPRAZOLE SODIUM 40 MG: 40 TABLET, DELAYED RELEASE ORAL at 08:05

## 2017-05-18 RX ADMIN — METOPROLOL SUCCINATE 100 MG: 50 TABLET, EXTENDED RELEASE ORAL at 08:05

## 2017-05-18 RX ADMIN — TIOTROPIUM BROMIDE 18 MCG: 18 CAPSULE ORAL; RESPIRATORY (INHALATION) at 09:05

## 2017-05-18 RX ADMIN — INSULIN ASPART 3 UNITS: 100 INJECTION, SOLUTION INTRAVENOUS; SUBCUTANEOUS at 12:05

## 2017-05-18 RX ADMIN — ATORVASTATIN CALCIUM 20 MG: 20 TABLET, FILM COATED ORAL at 08:05

## 2017-05-18 RX ADMIN — PIOGLITAZONE HYDROCHLORIDE 15 MG: 15 TABLET ORAL at 08:05

## 2017-05-18 RX ADMIN — HYDROCODONE BITARTATE AND ACETAMINOPHEN 1 TABLET: 5; 325 TABLET ORAL at 05:05

## 2017-05-18 RX ADMIN — BENAZEPRIL HYDROCHLORIDE 20 MG: 10 TABLET, FILM COATED ORAL at 08:05

## 2017-05-18 RX ADMIN — FUROSEMIDE 20 MG: 20 TABLET ORAL at 08:05

## 2017-05-18 RX ADMIN — ASPIRIN 81 MG: 81 TABLET, COATED ORAL at 08:05

## 2017-05-18 NOTE — PLAN OF CARE
Problem: Patient Care Overview  Goal: Plan of Care Review  Outcome: Ongoing (interventions implemented as appropriate)  Patient received on RA. No changes made at this time. No PRN treatment required. Will continue to monitor.

## 2017-05-18 NOTE — NURSING
Pt voided total of 300mL overnight. Oral fluids encouraged. Morning EKG performed. Minimal pain to abdomen. Lap sites clean, dry, intact. Pt in no apparent distress. Will continue to monitor.

## 2017-05-18 NOTE — PLAN OF CARE
Problem: Patient Care Overview  Goal: Plan of Care Review  Outcome: Outcome(s) achieved Date Met:  05/18/17  Pt remains free from falls, injury and skin breakdown. VSS. Pt denies chest pain, SOB or difficulty breathing. Abd soft & non-distended. Dressings on lap sites X 4 CDI. Pt denies N/V and is passing flatus. Pt tolerated regular diet. Has ambulated in room w/o complaint. Pt and family verbalize understanding of all DC instructions, including the need for a follow up appt. MD states that pt can return to work in 2 weeks & can call the office if pt wishes to returned sooner. Paper prescription given for PO analgesics. IV removed.

## 2017-05-18 NOTE — NURSING
Pt received 20 mg lasix at 1749. Pt urinated 20mL at 1830. Pt bladder scanned at 1945. Scanner read ~27mL in bladder.  Magdaleno notified. New orders of 200mL bolus of normal saline. Will recheck bladder at 2200 if pt has not voided.

## 2017-05-18 NOTE — NURSING
2200 Normal saline bolus complete. Ambulated pt to bedside commode. Pt able to void 100mL. Urine concentrated yellow. Will promote oral intake.

## 2017-05-18 NOTE — PLAN OF CARE
05/18/17 1216   Final Note   Assessment Type Final Discharge Note   Discharge Disposition Home   Discharge planning education complete? Yes   What phone number can be called within the next 1-3 days to see how you are doing after discharge? ([phone])   Hospital Follow Up  Appt(s) scheduled? (Patient will make her own appointment, has not seen Dr. Perla yet for instructions.)   Discharge plans and expectations educations in teach back method with documentation complete? Yes   Offered [facility] -- Bedside Delivery? n/a   [facility] n/a   Referral to Outpatient Case Management complete? n/a   Referral to / orders for Home Health Complete? n/a   30 day supply of medicines given at discharge, if documented non-compliance / non-adherence? n/a   Any social issues identified prior to discharge? No   Did you assess the readiness or willingness of the family or caregiver to support self management of care? n/a

## 2017-05-18 NOTE — PLAN OF CARE
Problem: Patient Care Overview  Goal: Plan of Care Review  Outcome: Ongoing (interventions implemented as appropriate)  Pt remains free from falls, injury and skin breakdown. VSS. Abd soft, lap sites X 4 CDI. Pt received PRN IV 0.4mg Dilaudid X 3 after post-op, spent much of the afternoon sleeping. Pt given IV Zofran X 1 for nausea w/o vomiting. Daily PO meds given at approx 1745 bc pt declined meds X 2 previous attempted. Dr Wade updated that BLE neg for DVT & that pt is still due to void post-op. Orders received to bladder scan if pt has not voided 8 hrs post-op then straight cath if residual > 300 mL. Pt resting, call bell within reach. Pt updated on POC: tx to Med-Surg bed when one becomes available.

## 2017-05-18 NOTE — PROGRESS NOTES
Feels well, denies breathlessness. Tolerated her diet.    Vitals:    05/17/17 2350 05/18/17 0511 05/18/17 0710 05/18/17 0745   BP: 133/60 (!) 114/59  139/63   BP Location:    Right arm   Patient Position:    Lying   BP Method:    Automatic   Pulse: 68 66 70 70   Resp: 12 16  16   Temp: 98.4 °F (36.9 °C) 98.6 °F (37 °C)  98.4 °F (36.9 °C)   TempSrc: Oral Oral  Oral   SpO2: 98%   100%   Weight:  59 kg (130 lb)     Height:         Chest clear  Cor: no gallop.    Stable, day post op.  Should be OK for discharge.

## 2017-05-18 NOTE — PROGRESS NOTES
No resp issues overnight     VSS - AF  NAD  On room air  Sitting up on side of bed  Heart reg  Lungs are clear   Abd NT    A: s/p cholecystectomy      COPD  P: being discharged today

## 2017-05-19 NOTE — DISCHARGE SUMMARY
DATE OF ADMISSION:  05/15/2017    DATE OF DISCHARGE:  05/18/2017    HISTORY:  This 77-year-old black female presented to the outpatient area for a   laparoscopic cholecystectomy.  Prior to surgery, she developed shortness of   breath and chest pain.  She was admitted to the ICU and was seen by Dr. Coombs   and Dr. Jarek Wade for evaluation.  No acute changes were noted in the   pulmonary or cardiac situation.  After being cleared by the specialties, she was   taken to Surgery on May 17 and underwent a laparoscopic cholecystectomy for   chronic cholecystitis and cholelithiasis.  The following day, she was doing   fine, tolerating a regular diet.  She was discharged to home in good condition,   on a regular diet, normal activity, her normal medications and will be seen back   in the office in 2 weeks.      EMMIE/  dd: 05/18/2017 11:40:20 (CDT)  td: 05/19/2017 03:02:45 (CDT)  Doc ID   #[id number]  Job ID #[id number]    CC:

## 2018-03-07 ENCOUNTER — OFFICE VISIT (OUTPATIENT)
Dept: CARDIOLOGY | Facility: CLINIC | Age: 78
End: 2018-03-07
Attending: INTERNAL MEDICINE
Payer: MEDICARE

## 2018-03-07 VITALS
WEIGHT: 122 LBS | SYSTOLIC BLOOD PRESSURE: 130 MMHG | DIASTOLIC BLOOD PRESSURE: 80 MMHG | BODY MASS INDEX: 21.62 KG/M2 | HEIGHT: 63 IN | HEART RATE: 78 BPM

## 2018-03-07 DIAGNOSIS — E78.00 HYPERCHOLESTEROLEMIA: ICD-10-CM

## 2018-03-07 DIAGNOSIS — N18.2 TYPE 2 DIABETES MELLITUS WITH STAGE 2 CHRONIC KIDNEY DISEASE, WITHOUT LONG-TERM CURRENT USE OF INSULIN: ICD-10-CM

## 2018-03-07 DIAGNOSIS — M05.742 RHEUMATOID ARTHRITIS INVOLVING BOTH HANDS WITH POSITIVE RHEUMATOID FACTOR: ICD-10-CM

## 2018-03-07 DIAGNOSIS — I50.32 HEART FAILURE, DIASTOLIC, CHRONIC: ICD-10-CM

## 2018-03-07 DIAGNOSIS — N18.2 CHRONIC KIDNEY DISEASE, STAGE II (MILD): ICD-10-CM

## 2018-03-07 DIAGNOSIS — I10 ESSENTIAL HYPERTENSION: ICD-10-CM

## 2018-03-07 DIAGNOSIS — M05.741 RHEUMATOID ARTHRITIS INVOLVING BOTH HANDS WITH POSITIVE RHEUMATOID FACTOR: ICD-10-CM

## 2018-03-07 DIAGNOSIS — R06.02 SHORTNESS OF BREATH: ICD-10-CM

## 2018-03-07 DIAGNOSIS — E11.22 TYPE 2 DIABETES MELLITUS WITH STAGE 2 CHRONIC KIDNEY DISEASE, WITHOUT LONG-TERM CURRENT USE OF INSULIN: ICD-10-CM

## 2018-03-07 PROCEDURE — 3079F DIAST BP 80-89 MM HG: CPT | Mod: S$GLB,,, | Performed by: INTERNAL MEDICINE

## 2018-03-07 PROCEDURE — 99214 OFFICE O/P EST MOD 30 MIN: CPT | Mod: S$GLB,,, | Performed by: INTERNAL MEDICINE

## 2018-03-07 PROCEDURE — 3075F SYST BP GE 130 - 139MM HG: CPT | Mod: S$GLB,,, | Performed by: INTERNAL MEDICINE

## 2018-03-07 RX ORDER — ASPIRIN 81 MG/1
81 TABLET ORAL DAILY
Qty: 90 TABLET | Refills: 3 | COMMUNITY
Start: 2018-03-07 | End: 2018-09-12 | Stop reason: SDUPTHER

## 2018-03-07 RX ORDER — ATORVASTATIN CALCIUM 20 MG/1
20 TABLET, FILM COATED ORAL DAILY
Qty: 90 TABLET | Refills: 3 | Status: SHIPPED | OUTPATIENT
Start: 2018-03-07 | End: 2018-06-01

## 2018-03-07 RX ORDER — BENAZEPRIL HYDROCHLORIDE 20 MG/1
20 TABLET ORAL DAILY
Qty: 90 TABLET | Refills: 3 | Status: SHIPPED | OUTPATIENT
Start: 2018-03-07 | End: 2018-09-12 | Stop reason: SDUPTHER

## 2018-03-07 RX ORDER — LINAGLIPTIN 5 MG/1
5 TABLET, FILM COATED ORAL DAILY
COMMUNITY
Start: 2018-03-01

## 2018-03-07 RX ORDER — FUROSEMIDE 20 MG/1
20 TABLET ORAL DAILY
Qty: 90 TABLET | Refills: 3 | Status: SHIPPED | OUTPATIENT
Start: 2018-03-07 | End: 2018-09-12

## 2018-03-07 RX ORDER — FOLIC ACID 1 MG/1
TABLET ORAL
Refills: 3 | COMMUNITY
Start: 2018-02-23

## 2018-03-07 RX ORDER — METOPROLOL SUCCINATE 100 MG/1
100 TABLET, EXTENDED RELEASE ORAL DAILY
Qty: 90 TABLET | Refills: 3 | Status: SHIPPED | OUTPATIENT
Start: 2018-03-07 | End: 2018-09-12

## 2018-03-07 RX ORDER — METFORMIN HYDROCHLORIDE 500 MG/1
TABLET, EXTENDED RELEASE ORAL
COMMUNITY
Start: 2018-03-01 | End: 2019-01-01

## 2018-03-07 RX ORDER — METHOTREXATE 2.5 MG/1
10 TABLET ORAL
COMMUNITY
Start: 2018-02-23

## 2018-03-07 NOTE — PROGRESS NOTES
Subjective:     Sandra Oseguera is a 78 y.o. female with hypertension, hypercholesterolemia and diabetes mellitus type 2. She has rheumatoid arthritis involving knees, hands and back. In the summer of 2013 she developed progressive exertional dyspnea occasionally associated with mild chest pressure. She had an Echocardiogram that revealed findings consistent with hypertensive heart disease with a high LVEDP. She had low exercise tolerance on a Stress MPI but no defects. She was began on furosemide and after that she was breathing better. She had no exertional chest pain but mild to moderate exertional dyspnea if walking fast but no wheezing. Her heart rate was 70-80 bpm at rest and no longer raced with activities. As it appeared her exertional dyspnea was out of proportion to her cardiac findings she was referrred for PFTs that were done on 1/13/2017. They revealed small airway obstruction without significant response to inhalers. She saw Dr. Jarek Wade and was prescribed Anoro inhalers and a rescue inhaler. She also got enrolled in pulmonary rehabilitation and did a 25 sessions. She is now breathing better. No palpitations or weak spells. Feeling well overall.    Congestive Heart Failure   Presents for follow-up visit. The disease course has been stable. Associated symptoms include shortness of breath. Pertinent negatives include no abdominal pain, chest pain, chest pressure, claudication, edema, fatigue, muscle weakness, near-syncope, orthopnea, palpitations, paroxysmal nocturnal dyspnea or unexpected weight change. The symptoms have been stable.   Shortness of Breath   This is a chronic problem. The current episode started more than 1 year ago. The problem occurs daily. The problem has been gradually improving. Pertinent negatives include no abdominal pain, chest pain, claudication, coryza, ear pain, fever, headaches, hemoptysis, leg pain (entire legs), leg swelling, neck pain, orthopnea, PND, rash,  rhinorrhea, sore throat, sputum production, swollen glands, syncope, vomiting or wheezing.   Hypertension   This is a chronic problem. The current episode started more than 1 year ago. The problem is unchanged. The problem is controlled (usually 125-135/60-70 mmHg at home). Associated symptoms include shortness of breath. Pertinent negatives include no anxiety, blurred vision, chest pain, headaches, malaise/fatigue, neck pain, orthopnea, palpitations, peripheral edema, PND or sweats. Identifiable causes of hypertension include chronic renal disease.   Hyperlipidemia   This is a chronic problem. The current episode started more than 1 year ago. The problem is controlled. Recent lipid tests were reviewed and are normal. Exacerbating diseases include chronic renal disease and diabetes. She has no history of hypothyroidism, liver disease, obesity or nephrotic syndrome. Associated symptoms include shortness of breath. Pertinent negatives include no chest pain, focal sensory loss, focal weakness, leg pain (entire legs) or myalgias.       Review of Systems   Constitution: Negative for fatigue, fever, malaise/fatigue, unexpected weight change, weight gain and weight loss.   HENT: Negative for ear pain, nosebleeds, rhinorrhea and sore throat.    Eyes: Negative for blurred vision, pain and redness.   Cardiovascular: Positive for dyspnea on exertion. Negative for chest pain, claudication, irregular heartbeat, leg swelling, near-syncope, orthopnea, palpitations, paroxysmal nocturnal dyspnea and syncope.   Respiratory: Positive for shortness of breath. Negative for cough, hemoptysis, sputum production and wheezing.    Endocrine: Negative for cold intolerance and heat intolerance.   Hematologic/Lymphatic: Negative for bleeding problem. Does not bruise/bleed easily.   Skin: Negative for color change and rash.   Musculoskeletal: Positive for arthritis, back pain, falls (8/2016: Fell going up steps) and joint pain. Negative for  muscle weakness, myalgias and neck pain.   Gastrointestinal: Negative for abdominal pain, heartburn, hematemesis, hematochezia, hemorrhoids, jaundice, melena, nausea and vomiting.   Genitourinary: Negative for dysuria, hematuria and menorrhagia.   Neurological: Negative for difficulty with concentration, dizziness, focal weakness, headaches, light-headedness, loss of balance, numbness, paresthesias, tremors and vertigo.   Psychiatric/Behavioral: Negative for altered mental status, depression and memory loss. The patient is not nervous/anxious.    Allergic/Immunologic: Negative for hives and persistent infections.       Current Outpatient Prescriptions on File Prior to Visit   Medication Sig Dispense Refill    ANORO ELLIPTA 62.5-25 mcg/actuation DsDv       aspirin (ECOTRIN) 81 MG EC tablet Take 1 tablet (81 mg total) by mouth once daily. 90 tablet 3    benazepril (LOTENSIN) 20 MG tablet TAKE 1 TABLET(20 MG) BY MOUTH EVERY DAY 90 tablet 0    DOCOSAHEXANOIC ACID/EPA (FISH OIL ORAL) Take 1,200 mg by mouth.      furosemide (LASIX) 20 MG tablet TAKE 1 TABLET(20 MG) BY MOUTH EVERY DAY 90 tablet 0    LINACLOTIDE (LINZESS ORAL) Take 72 mcg by mouth once daily.      LOPERAMIDE HCL (IMODIUM A-D ORAL) Take 1 tablet by mouth as needed.      metoprolol succinate (TOPROL-XL) 100 MG 24 hr tablet TAKE 1 TABLET(100 MG) BY MOUTH EVERY DAY 90 tablet 0    NAPROXEN SODIUM (ALEVE ORAL) Take 1 tablet by mouth as needed.      pantoprazole (PROTONIX) 40 MG tablet Take 40 mg by mouth once daily.      pioglitazone-metformin (ACTOPLUS MET)  mg per tablet       UMECLIDINIUM BRM/VILANTEROL TR (ANORO ELLIPTA INHL) Inhale into the lungs once daily.      VENTOLIN HFA 90 mcg/actuation inhaler       vitamin D 1000 units Tab Take 185 mg by mouth once daily.      atorvastatin (LIPITOR) 20 MG tablet Take 1 tablet (20 mg total) by mouth once daily. 90 tablet 3    [DISCONTINUED] glipiZIDE (GLUCOTROL) 5 MG TR24   4    [DISCONTINUED]  "pioglitazone (ACTOS) 15 MG tablet Take 15 mg by mouth once daily.       No current facility-administered medications on file prior to visit.        /80   Pulse 78   Ht 5' 3" (1.6 m)   Wt 55.3 kg (122 lb)   BMI 21.61 kg/m²       Objective:     Physical Exam   Constitutional: She is oriented to person, place, and time. She appears well-developed and well-nourished.  Non-toxic appearance. She does not appear ill. No distress.   HENT:   Head: Normocephalic and atraumatic.   Nose: Nose normal.   Mouth/Throat: No oropharyngeal exudate.   Eyes: Right eye exhibits no discharge. Left eye exhibits no discharge. Right conjunctiva is not injected. Left conjunctiva is not injected. Right pupil is round. Left pupil is round. Pupils are equal.   Neck: Neck supple. No JVD present. Carotid bruit is not present. No tracheal deviation present.   Cardiovascular: Normal rate, regular rhythm, S1 normal and S2 normal.  PMI is not displaced.  Exam reveals gallop and S4. Exam reveals no S3.    Murmur heard.   Midsystolic murmur is present with a grade of 2/6  at the upper right sternal border  Pulses:       Radial pulses are 2+ on the right side, and 2+ on the left side.        Dorsalis pedis pulses are 1+ on the right side, and 1+ on the left side.        Posterior tibial pulses are 1+ on the right side, and 1+ on the left side.   Pulmonary/Chest: Effort normal and breath sounds normal.   Abdominal: Soft. Normal appearance. There is no hepatosplenomegaly. There is no tenderness.   Musculoskeletal:        Right ankle: She exhibits no swelling, no ecchymosis and no deformity.        Left ankle: She exhibits no swelling, no ecchymosis and no deformity.   Lymphadenopathy:        Head (right side): No submandibular adenopathy present.        Head (left side): No submandibular adenopathy present.     She has no cervical adenopathy.   Neurological: She is alert and oriented to person, place, and time. She is not disoriented. No cranial " nerve deficit or sensory deficit.   Skin: Skin is warm, dry and intact. No rash noted. She is not diaphoretic. Nails show no clubbing.   Psychiatric: She has a normal mood and affect. Her speech is normal and behavior is normal. Judgment and thought content normal. Cognition and memory are normal.       Assessment:      1. Heart failure, diastolic, chronic    2. Shortness of breath    3. Essential hypertension    4. Hypercholesterolemia    5. Type 2 diabetes mellitus with stage 2 chronic kidney disease, without long-term current use of insulin    6. Chronic kidney disease, stage II (mild)    7. Rheumatoid arthritis involving both hands with positive rheumatoid factor        Plan:     1. Heart Failure, Diastolic, Chronic   1/10/2014: CXR: WNL.   2/13/2014: Echo: Normal LV size and function. Mild LVH. Moderate diastolic dysfunction. Elevated LVEDP. Mildly dilated LA.   2/13/2014: Stress MPI: 3:30 min. No CP. Low exercise tolerance. ECG negative. MPI negative.   Suspect exertional dyspnea related to diastolic dysfunction and high LVEDP as well as her pulmonary disease.   Significantly less short of breath since on furosemide 20 mg Q24.   As heart rate appeared to go up with exertion to a level where she felt palpitations increased dose of metoprolol to 100 mg Q24.   Stable.    2. Shortness of Breath   8/2013: Began experience exertional dyspnea.   Overall improved but out of proportion to her diastolic dysfunction.   1/13/2017: PFTs: Small airway obstruction without significant response to inhalers.    On Anoro inhaler, Ventoline and was in pulmonary rehabilitation program.   Stable.   Dr. Jarek Wade.      3. Hypertension   1992: Diagnosed.   On metoprolol 100 mg Q24, benazepril 20 mg Q24 and furosemide 20 mg Q24.   Keeping log at home.   Overall well controlled.     4. Hypercholesterolemia   1992: Diagnosed.   On atorvastatin 20 mg Q24.   Tells me well controlled.     5. Diabetes Mellitus, Type 2   1992: Diagnosed.  Complications: CKD2. Medications: Oral agent.   On metformin and Trajenta.   On aspirin 81 mg Q24.   Well controlled.    6. Chronic Kidney Disease, Stage 2   3/3/2015: BUN/crea 15/1.0. CrCl 65.   Stable.    7. Rheumatoid Arthritis.   2006: Diagnosed. Involvement: Hands, knees and back.   On methotrexate and folic acid.   Dr. Ezequiel Figueroa Jr..     8. Primary Care   Dr. Mauricio Sood.    F/u 6 months.    Garrett Calderon M.D.

## 2018-06-06 ENCOUNTER — HOSPITAL ENCOUNTER (EMERGENCY)
Facility: OTHER | Age: 78
Discharge: HOME | End: 2018-06-06
Attending: EMERGENCY MEDICINE
Payer: MEDICARE

## 2018-06-06 VITALS
SYSTOLIC BLOOD PRESSURE: 187 MMHG | WEIGHT: 119.5 LBS | RESPIRATION RATE: 20 BRPM | HEART RATE: 96 BPM | HEIGHT: 63 IN | BODY MASS INDEX: 21.17 KG/M2 | TEMPERATURE: 98 F | OXYGEN SATURATION: 100 % | DIASTOLIC BLOOD PRESSURE: 81 MMHG

## 2018-06-06 DIAGNOSIS — M79.673 HEEL PAIN: ICD-10-CM

## 2018-06-06 DIAGNOSIS — L97.512 CHRONIC ULCER OF RIGHT FOOT WITH FAT LAYER EXPOSED: Primary | ICD-10-CM

## 2018-06-06 PROCEDURE — 99283 EMERGENCY DEPT VISIT LOW MDM: CPT | Mod: 25

## 2018-06-06 PROCEDURE — 25000003 PHARM REV CODE 250: Performed by: EMERGENCY MEDICINE

## 2018-06-06 RX ORDER — HYDROCODONE BITARTRATE AND ACETAMINOPHEN 5; 325 MG/1; MG/1
1 TABLET ORAL
Status: COMPLETED | OUTPATIENT
Start: 2018-06-06 | End: 2018-06-06

## 2018-06-06 RX ORDER — LIDOCAINE 50 MG/G
OINTMENT TOPICAL
Qty: 3 G | Refills: 0 | Status: SHIPPED | OUTPATIENT
Start: 2018-06-06 | End: 2019-01-01

## 2018-06-06 RX ADMIN — HYDROCODONE BITARTRATE AND ACETAMINOPHEN 1 TABLET: 5; 325 TABLET ORAL at 07:06

## 2018-06-06 NOTE — ED TRIAGE NOTES
"Pt reports right heel pain since October of 2017. Pt reports 10 out of 10 pain . Pt ambulatory to room with steady gait, reports seeing podiatrist last Friday, was given antibiotics and a cream to apply. Pt reports "its not helping." Pt is AAO x 3, answers questions appropriately  "

## 2018-06-06 NOTE — ED PROVIDER NOTES
Encounter Date: 6/6/2018    SCRIBE #1 NOTE: I, Samantha Banks, am scribing for, and in the presence of, Dr. Whaley.       History     Chief Complaint   Patient presents with    Heel Pain     inside and out since 10/2017, her podriatrist, Dr. Ramirez can't figure it out, no trauma     Time seen by provider: 7:08 PM    This is a 78 y.o. female, with a history of CHF, CKD, DM, and HTN, who presents with complaint of heel pain. Patient reports intermittent right heel pain that began about eight months ago. She states this episode of pain began about four months ago, and has been constant since. She describes pain as a twisting sensation. She rates pain as a 10 out of 10. She was seen by her podiatrist last Friday, and started on antibiotics. She states her podiatrist has not determined a cause for her pain. She reports using oral antibiotics (began 6 days ago), topical cream, and tramadol with no relief. She denies fever, chills, leg swelling, tingling, or numbness. She has never received imaging for this complaint. She has no additional complaints.      The history is provided by the patient.     Review of patient's allergies indicates:   Allergen Reactions    Codeine Nausea And Vomiting     States can take oxycodone and hydrocodone    Sulfa (sulfonamide antibiotics) Nausea And Vomiting     Past Medical History:   Diagnosis Date    CHF (congestive heart failure)     Chronic kidney disease, stage II (mild) 2/3/2014    Diabetes mellitus     Diabetes mellitus, type 2 2/3/2014    Diagnosed 1992.    GERD (gastroesophageal reflux disease)     Heart failure, diastolic, chronic 3/3/2014    History of bronchitis     Hypercholesterolemia 12/1/2014    Hypertension     Hypertension, malignant 2/3/2014    Diagnosed 1992.    PONV (postoperative nausea and vomiting)     Rheumatoid arthritis 12/1/2014    2006: Diagnosed.    Shortness of breath 2/3/2014    Since 8/2013.     Past Surgical History:   Procedure Laterality  Date    APPENDECTOMY      CHOLECYSTECTOMY      HYSTERECTOMY  1976    TOE SURGERY  2015     History reviewed. No pertinent family history.  Social History   Substance Use Topics    Smoking status: Never Smoker    Smokeless tobacco: Never Used    Alcohol use No     Review of Systems   Constitutional: Negative for chills and fever.   HENT: Negative for sore throat.    Respiratory: Negative for cough and shortness of breath.    Cardiovascular: Negative for chest pain and leg swelling.   Gastrointestinal: Negative for abdominal pain, nausea and vomiting.   Genitourinary: Negative for dysuria.   Musculoskeletal: Negative for back pain.        Positive for right heel pain.    Skin: Negative for rash.   Neurological: Negative for weakness and numbness.        Negative for tingling.    Hematological: Does not bruise/bleed easily.       Physical Exam     Initial Vitals [06/06/18 1840]   BP Pulse Resp Temp SpO2   (!) 187/81 96 20 97.9 °F (36.6 °C) 100 %      MAP       116.33         Physical Exam    Nursing note and vitals reviewed.  Constitutional: She appears well-developed and well-nourished. She is not diaphoretic. No distress.   HENT:   Head: Normocephalic and atraumatic.   Right Ear: External ear normal.   Left Ear: External ear normal.   Eyes: EOM are normal. Right eye exhibits no discharge. Left eye exhibits no discharge.   Neck: Normal range of motion.   Cardiovascular: Normal rate, regular rhythm, normal heart sounds and intact distal pulses. Exam reveals no gallop and no friction rub.    No murmur heard.  DP pulses are 2+.    Pulmonary/Chest: Breath sounds normal. No respiratory distress. She has no wheezes. She has no rhonchi. She has no rales.   Abdominal: Soft. There is no tenderness. There is no rebound and no guarding.   Musculoskeletal: Normal range of motion. She exhibits no edema or tenderness.   Neurological: She is alert and oriented to person, place, and time.   Skin: Skin is warm and dry. No rash  noted.   Ulcer to lateral aspect of the heel. No drainage. No odor. No crepitus.    Psychiatric: She has a normal mood and affect. Her behavior is normal. Judgment and thought content normal.         ED Course   Procedures  Labs Reviewed - No data to display       X-Ray Foot Complete Right   Final Result      Postsurgical and chronic appearing changes as detailed above.  No definite acute osseous abnormality identified.         Electronically signed by: Bc Goldberg MD   Date:    06/06/2018   Time:    19:44        X-Rays:   Independently Interpreted Readings:   Chest X-Ray: No acute process.      Medical Decision Making:   History:   Old Medical Records: I decided to obtain old medical records.  Initial Assessment:   Urgent evaluation of a 78-year-old female presenting with ulcer to the right heel.  Pain has been intermittent since October.  Patient was started on oral and topical antibiotic 06/01/2018 by her podiatrist  Differential Diagnosis:   Osteomyelitis, chronic ulcer, necrotizing fasciitis  Independently Interpreted Test(s):   I have ordered and independently interpreted X-rays - see prior notes.  Clinical Tests:   Radiological Study: Ordered and Reviewed  ED Management:  Patient is well-appearing in no acute distress. There is no erythema, purulent drainage, foul odor, fluctuance or induration.  2+ distal pulses, no crepitus.   X-ray ordered to rule out osteomyelitis    7:57 PM X-ray reviewed, no acute process. Will prescribe topical lidocaine ointment to apply for pain control.  Refer back to Podiatry                Attending Attestation:           Physician Attestation for Scribe:  Physician Attestation Statement for Scribe #1: I, Dr. Whaley , reviewed documentation, as scribed by Samantha Banks in my presence, and it is both accurate and complete.     Comments: I, Dr. Shannon Whaley, personally performed the services described in this documentation. All medical record entries made by the scribe were at  my direction and in my presence.  I have reviewed the chart and agree that the record reflects my personal performance and is accurate and complete. Shannon Whaley MD.                 Clinical Impression:     1. Chronic ulcer of right foot with fat layer exposed    2. Heel pain            Disposition:   Disposition: Discharged  Condition: Stable                        Shannon Whaley MD  06/06/18 2033

## 2018-09-12 ENCOUNTER — OFFICE VISIT (OUTPATIENT)
Dept: CARDIOLOGY | Facility: CLINIC | Age: 78
End: 2018-09-12
Attending: INTERNAL MEDICINE
Payer: MEDICARE

## 2018-09-12 VITALS
SYSTOLIC BLOOD PRESSURE: 117 MMHG | HEART RATE: 81 BPM | WEIGHT: 123 LBS | HEIGHT: 63 IN | BODY MASS INDEX: 21.79 KG/M2 | DIASTOLIC BLOOD PRESSURE: 62 MMHG

## 2018-09-12 DIAGNOSIS — E78.00 HYPERCHOLESTEROLEMIA: ICD-10-CM

## 2018-09-12 DIAGNOSIS — N18.2 CHRONIC KIDNEY DISEASE, STAGE II (MILD): ICD-10-CM

## 2018-09-12 DIAGNOSIS — I10 ESSENTIAL HYPERTENSION: ICD-10-CM

## 2018-09-12 DIAGNOSIS — N18.2 TYPE 2 DIABETES MELLITUS WITH STAGE 2 CHRONIC KIDNEY DISEASE, WITHOUT LONG-TERM CURRENT USE OF INSULIN: ICD-10-CM

## 2018-09-12 DIAGNOSIS — M05.741 RHEUMATOID ARTHRITIS INVOLVING BOTH HANDS WITH POSITIVE RHEUMATOID FACTOR: ICD-10-CM

## 2018-09-12 DIAGNOSIS — R06.02 SHORTNESS OF BREATH: ICD-10-CM

## 2018-09-12 DIAGNOSIS — I50.32 HEART FAILURE, DIASTOLIC, CHRONIC: ICD-10-CM

## 2018-09-12 DIAGNOSIS — I45.10 RIGHT BUNDLE BRANCH BLOCK: ICD-10-CM

## 2018-09-12 DIAGNOSIS — M05.742 RHEUMATOID ARTHRITIS INVOLVING BOTH HANDS WITH POSITIVE RHEUMATOID FACTOR: ICD-10-CM

## 2018-09-12 DIAGNOSIS — E11.22 TYPE 2 DIABETES MELLITUS WITH STAGE 2 CHRONIC KIDNEY DISEASE, WITHOUT LONG-TERM CURRENT USE OF INSULIN: ICD-10-CM

## 2018-09-12 PROBLEM — R07.9 CHEST PAIN: Status: RESOLVED | Noted: 2017-05-15 | Resolved: 2018-09-12

## 2018-09-12 PROCEDURE — 99214 OFFICE O/P EST MOD 30 MIN: CPT | Mod: 25,S$GLB,, | Performed by: INTERNAL MEDICINE

## 2018-09-12 PROCEDURE — 3078F DIAST BP <80 MM HG: CPT | Mod: CPTII,S$GLB,, | Performed by: INTERNAL MEDICINE

## 2018-09-12 PROCEDURE — 93000 ELECTROCARDIOGRAM COMPLETE: CPT | Mod: S$GLB,,, | Performed by: INTERNAL MEDICINE

## 2018-09-12 PROCEDURE — 3074F SYST BP LT 130 MM HG: CPT | Mod: CPTII,S$GLB,, | Performed by: INTERNAL MEDICINE

## 2018-09-12 RX ORDER — FUROSEMIDE 20 MG/1
20 TABLET ORAL DAILY
Qty: 90 TABLET | Refills: 3 | Status: SHIPPED | OUTPATIENT
Start: 2018-09-12 | End: 2019-04-30 | Stop reason: SDUPTHER

## 2018-09-12 RX ORDER — GABAPENTIN 300 MG/1
CAPSULE ORAL
Refills: 2 | COMMUNITY
Start: 2018-08-24

## 2018-09-12 RX ORDER — ATORVASTATIN CALCIUM 20 MG/1
20 TABLET, FILM COATED ORAL DAILY
Qty: 90 TABLET | Refills: 3 | Status: SHIPPED | OUTPATIENT
Start: 2018-09-12 | End: 2019-04-30 | Stop reason: SDUPTHER

## 2018-09-12 RX ORDER — PANTOPRAZOLE SODIUM 40 MG/1
1 TABLET, DELAYED RELEASE ORAL
COMMUNITY
End: 2018-09-12

## 2018-09-12 RX ORDER — ASPIRIN 81 MG/1
81 TABLET ORAL DAILY
Qty: 90 TABLET | Refills: 3 | COMMUNITY
Start: 2018-09-12 | End: 2019-04-30 | Stop reason: SDUPTHER

## 2018-09-12 RX ORDER — BENAZEPRIL HYDROCHLORIDE 20 MG/1
20 TABLET ORAL DAILY
Qty: 90 TABLET | Refills: 3 | Status: SHIPPED | OUTPATIENT
Start: 2018-09-12 | End: 2019-04-30 | Stop reason: SDUPTHER

## 2018-09-12 RX ORDER — METOPROLOL SUCCINATE 100 MG/1
100 TABLET, EXTENDED RELEASE ORAL DAILY
Qty: 90 TABLET | Refills: 3 | Status: ON HOLD | OUTPATIENT
Start: 2018-09-12 | End: 2019-01-01 | Stop reason: HOSPADM

## 2018-09-12 NOTE — PROGRESS NOTES
Subjective:     Sandra Oseguera is a 78 y.o. female with hypertension, hypercholesterolemia and diabetes mellitus type 2. She has rheumatoid arthritis involving knees, hands and back. In the summer of 2013 she developed progressive exertional dyspnea occasionally associated with mild chest pressure. She had an Echocardiogram that revealed findings consistent with hypertensive heart disease with a high LVEDP. She had low exercise tolerance on a Stress MPI but no defects. She was began on furosemide and after that she was breathing better. She had no exertional chest pain but mild to moderate exertional dyspnea if walking fast but no wheezing. Her heart rate was 70-80 bpm at rest and no longer raced with activities. As it appeared her exertional dyspnea was out of proportion to her cardiac findings she was referrred for PFTs that were done on 1/13/2017. They revealed small airway obstruction without significant response to inhalers. She saw Dr. Jarek Wade and was prescribed Anoro inhalers and a rescue inhaler. She also got enrolled in pulmonary rehabilitation and did 25 sessions. She is now breathing better. She actually thinks her legs holds her back more than the breathing. No palpitations or weak spells. Feeling well overall.      Congestive Heart Failure   Presents for follow-up visit. The disease course has been stable. Associated symptoms include shortness of breath. Pertinent negatives include no abdominal pain, chest pain, chest pressure, claudication, edema, fatigue, muscle weakness, near-syncope, nocturia, orthopnea, palpitations, paroxysmal nocturnal dyspnea or unexpected weight change. The symptoms have been stable.   Shortness of Breath   This is a chronic problem. The current episode started more than 1 year ago. The problem occurs daily. The problem has been gradually improving. Pertinent negatives include no abdominal pain, chest pain, claudication, coryza, ear pain, fever, headaches,  hemoptysis, leg pain (entire legs), leg swelling, neck pain, orthopnea, PND, rash, rhinorrhea, sore throat, sputum production, swollen glands, syncope, vomiting or wheezing.   Hypertension   This is a chronic problem. The current episode started more than 1 year ago. The problem is unchanged. The problem is controlled (usually 120-130/60-70 mmHg at home). Associated symptoms include shortness of breath. Pertinent negatives include no anxiety, blurred vision, chest pain, headaches, malaise/fatigue, neck pain, orthopnea, palpitations, peripheral edema, PND or sweats. Identifiable causes of hypertension include chronic renal disease.   Hyperlipidemia   This is a chronic problem. The current episode started more than 1 year ago. The problem is controlled. Recent lipid tests were reviewed and are normal. Exacerbating diseases include chronic renal disease and diabetes. She has no history of hypothyroidism, liver disease, obesity or nephrotic syndrome. Associated symptoms include shortness of breath. Pertinent negatives include no chest pain, focal sensory loss, focal weakness, leg pain (entire legs) or myalgias.       Review of Systems   Constitution: Negative for fatigue, fever, malaise/fatigue, unexpected weight change, weight gain and weight loss.   HENT: Negative for ear pain, nosebleeds, rhinorrhea and sore throat.    Eyes: Negative for blurred vision, pain and redness.   Cardiovascular: Positive for dyspnea on exertion. Negative for chest pain, claudication, irregular heartbeat, leg swelling, near-syncope, orthopnea, palpitations, paroxysmal nocturnal dyspnea and syncope.   Respiratory: Positive for shortness of breath. Negative for cough, hemoptysis, sputum production and wheezing.    Endocrine: Negative for cold intolerance and heat intolerance.   Hematologic/Lymphatic: Negative for bleeding problem. Does not bruise/bleed easily.   Skin: Negative for color change and rash.   Musculoskeletal: Positive for  arthritis, back pain and joint pain. Negative for falls (8/2016: Fell going up steps), muscle weakness, myalgias and neck pain.   Gastrointestinal: Negative for abdominal pain, heartburn, hematemesis, hematochezia, hemorrhoids, jaundice, melena, nausea and vomiting.   Genitourinary: Negative for dysuria, hematuria, menorrhagia and nocturia.   Neurological: Negative for difficulty with concentration, dizziness, focal weakness, headaches, light-headedness, loss of balance, numbness, paresthesias, tremors and vertigo.   Psychiatric/Behavioral: Negative for altered mental status, depression and memory loss. The patient is not nervous/anxious.    Allergic/Immunologic: Negative for hives and persistent infections.       Current Outpatient Medications on File Prior to Visit   Medication Sig Dispense Refill    ANORO ELLIPTA 62.5-25 mcg/actuation DsDv       aspirin (ECOTRIN) 81 MG EC tablet Take 1 tablet (81 mg total) by mouth once daily. 90 tablet 3    atorvastatin (LIPITOR) 20 MG tablet TAKE 1 TABLET(20 MG) BY MOUTH EVERY DAY 90 tablet 0    benazepril (LOTENSIN) 20 MG tablet Take 1 tablet (20 mg total) by mouth once daily. 90 tablet 3    DOCOSAHEXANOIC ACID/EPA (FISH OIL ORAL) Take 1,200 mg by mouth.      folic acid (FOLVITE) 1 MG tablet TK 1 T PO QD  3    furosemide (LASIX) 20 MG tablet Take 1 tablet (20 mg total) by mouth once daily. 90 tablet 3    furosemide (LASIX) 20 MG tablet TAKE 1 TABLET(20 MG) BY MOUTH EVERY DAY 90 tablet 0    gabapentin (NEURONTIN) 300 MG capsule TK 1 C PO D HS  2    lidocaine (XYLOCAINE) 5 % Oint ointment Apply topically as needed. 3 g 0    LINACLOTIDE (LINZESS ORAL) Take 72 mcg by mouth once daily.      LOPERAMIDE HCL (IMODIUM A-D ORAL) Take 1 tablet by mouth as needed.      metFORMIN (GLUCOPHAGE-XR) 500 MG 24 hr tablet       methotrexate 2.5 MG Tab       metoprolol succinate (TOPROL-XL) 100 MG 24 hr tablet Take 1 tablet (100 mg total) by mouth once daily. 90 tablet 3     "metoprolol succinate (TOPROL-XL) 100 MG 24 hr tablet TAKE 1 TABLET(100 MG) BY MOUTH EVERY DAY 90 tablet 0    NAPROXEN SODIUM (ALEVE ORAL) Take 1 tablet by mouth as needed.      pantoprazole (PROTONIX) 40 MG tablet Take 40 mg by mouth once daily.      pantoprazole (PROTONIX) 40 MG tablet Take 1 tablet by mouth.      pioglitazone-metformin (ACTOPLUS MET)  mg per tablet       TRADJENTA 5 mg Tab tablet       UMECLIDINIUM BRM/VILANTEROL TR (ANORO ELLIPTA INHL) Inhale into the lungs once daily.      VENTOLIN HFA 90 mcg/actuation inhaler       vitamin D 1000 units Tab Take 185 mg by mouth once daily.       No current facility-administered medications on file prior to visit.        /62   Pulse 81   Ht 5' 3" (1.6 m)   Wt 55.8 kg (123 lb)   BMI 21.79 kg/m²       Objective:     Physical Exam   Constitutional: She is oriented to person, place, and time. She appears well-developed and well-nourished.  Non-toxic appearance. She does not appear ill. No distress.   HENT:   Head: Normocephalic and atraumatic.   Nose: Nose normal.   Mouth/Throat: No oropharyngeal exudate.   Eyes: Right eye exhibits no discharge. Left eye exhibits no discharge. Right conjunctiva is not injected. Left conjunctiva is not injected. Right pupil is round. Left pupil is round. Pupils are equal.   Neck: Neck supple. No JVD present. Carotid bruit is not present. No tracheal deviation present.   Cardiovascular: Normal rate, regular rhythm, S1 normal and S2 normal. PMI is not displaced. Exam reveals gallop and S4. Exam reveals no S3.   Murmur heard.   Midsystolic murmur is present with a grade of 2/6 at the upper right sternal border.  Pulses:       Radial pulses are 2+ on the right side, and 2+ on the left side.        Dorsalis pedis pulses are 1+ on the right side, and 1+ on the left side.        Posterior tibial pulses are 1+ on the right side, and 1+ on the left side.   Pulmonary/Chest: Effort normal and breath sounds normal. "   Abdominal: Soft. Normal appearance. There is no hepatosplenomegaly. There is no tenderness.   Musculoskeletal:        Right ankle: She exhibits no swelling, no ecchymosis and no deformity.        Left ankle: She exhibits no swelling, no ecchymosis and no deformity.   Lymphadenopathy:        Head (right side): No submandibular adenopathy present.        Head (left side): No submandibular adenopathy present.     She has no cervical adenopathy.   Neurological: She is alert and oriented to person, place, and time. She is not disoriented. No cranial nerve deficit or sensory deficit.   Skin: Skin is warm, dry and intact. No rash noted. She is not diaphoretic. Nails show no clubbing.   Psychiatric: She has a normal mood and affect. Her speech is normal and behavior is normal. Judgment and thought content normal. Cognition and memory are normal.       Assessment:      1. Heart failure, diastolic, chronic    2. Right bundle branch block    3. Shortness of breath    4. Essential hypertension    5. Hypercholesterolemia    6. Type 2 diabetes mellitus with stage 2 chronic kidney disease, without long-term current use of insulin    7. Chronic kidney disease, stage II (mild)    8. Rheumatoid arthritis involving both hands with positive rheumatoid factor        Plan:     1. Heart Failure, Diastolic, Chronic   1/10/2014: CXR: WNL.   2/13/2014: Echo: Normal LV size and function. Mild LVH. Moderate diastolic dysfunction. Elevated LVEDP. Mildly dilated LA.   2/13/2014: Stress MPI: 3:30 min. No CP. Low exercise tolerance. ECG negative. MPI negative.   Suspect exertional dyspnea related to diastolic dysfunction and high LVEDP as well as her pulmonary disease.   Significantly less short of breath since on furosemide 20 mg Q24.   As heart rate appeared to go up with exertion to a level where she felt palpitations increased dose of metoprolol to 100 mg Q24.   Well controlled.   Stable.    2. Right Bundle Branch Block   2018: Diagnosed  with RBBB.    3. Shortness of Breath   8/2013: Began experience exertional dyspnea.   Overall improved but out of proportion to her diastolic dysfunction.   1/13/2017: PFTs: Small airway obstruction without significant response to inhalers.    On Anoro inhaler, Ventoline and was in pulmonary rehabilitation program.   Stable.   Dr. Jarek Wade.      4. Hypertension   1992: Diagnosed.   On metoprolol 100 mg Q24, benazepril 20 mg Q24 and furosemide 20 mg Q24.   Keeping log at home.   Overall well controlled.     5. Hypercholesterolemia   1992: Diagnosed.   On atorvastatin 20 mg Q24.   Tells me well controlled.     6. Diabetes Mellitus, Type 2   1992: Diagnosed. Complications: CKD2. Medications: Oral agent.   On metformin and Trajenta.   On aspirin 81 mg Q24.   Well controlled.    7. Chronic Kidney Disease, Stage 2   3/3/2015: BUN/crea 15/1.0. CrCl 65.   Stable.    8. Rheumatoid Arthritis.   2006: Diagnosed. Involvement: Hands, knees and back.   On methotrexate and folic acid.   Dr. Ezequiel Figueroa Jr..     9. Primary Care   Dr. Mauricio Sood.    F/u 6 months.    Garrett Calderon M.D.

## 2019-01-01 ENCOUNTER — OFFICE VISIT (OUTPATIENT)
Dept: CARDIOLOGY | Facility: CLINIC | Age: 79
End: 2019-01-01
Attending: INTERNAL MEDICINE
Payer: MEDICARE

## 2019-01-01 ENCOUNTER — ANESTHESIA EVENT (OUTPATIENT)
Dept: CARDIOLOGY | Facility: OTHER | Age: 79
DRG: 308 | End: 2019-01-01
Payer: MEDICARE

## 2019-01-01 ENCOUNTER — PATIENT OUTREACH (OUTPATIENT)
Dept: ADMINISTRATIVE | Facility: CLINIC | Age: 79
End: 2019-01-01

## 2019-01-01 ENCOUNTER — ANESTHESIA (OUTPATIENT)
Dept: CARDIOLOGY | Facility: OTHER | Age: 79
DRG: 308 | End: 2019-01-01
Payer: MEDICARE

## 2019-01-01 ENCOUNTER — HOSPITAL ENCOUNTER (INPATIENT)
Facility: OTHER | Age: 79
LOS: 4 days | Discharge: HOME | DRG: 308 | End: 2019-09-15
Attending: EMERGENCY MEDICINE | Admitting: EMERGENCY MEDICINE
Payer: MEDICARE

## 2019-01-01 VITALS
HEIGHT: 63 IN | SYSTOLIC BLOOD PRESSURE: 142 MMHG | TEMPERATURE: 98 F | DIASTOLIC BLOOD PRESSURE: 75 MMHG | OXYGEN SATURATION: 99 % | BODY MASS INDEX: 23.99 KG/M2 | HEART RATE: 71 BPM | WEIGHT: 135.38 LBS | RESPIRATION RATE: 18 BRPM

## 2019-01-01 VITALS
HEIGHT: 63 IN | HEART RATE: 60 BPM | SYSTOLIC BLOOD PRESSURE: 130 MMHG | DIASTOLIC BLOOD PRESSURE: 68 MMHG | WEIGHT: 133 LBS | BODY MASS INDEX: 23.57 KG/M2

## 2019-01-01 DIAGNOSIS — I10 ESSENTIAL HYPERTENSION: ICD-10-CM

## 2019-01-01 DIAGNOSIS — I48.91 ATRIAL FIBRILLATION: ICD-10-CM

## 2019-01-01 DIAGNOSIS — E11.22 TYPE 2 DIABETES MELLITUS WITH STAGE 2 CHRONIC KIDNEY DISEASE, WITHOUT LONG-TERM CURRENT USE OF INSULIN: ICD-10-CM

## 2019-01-01 DIAGNOSIS — E78.00 HYPERCHOLESTEROLEMIA: ICD-10-CM

## 2019-01-01 DIAGNOSIS — N18.2 CHRONIC KIDNEY DISEASE, STAGE II (MILD): ICD-10-CM

## 2019-01-01 DIAGNOSIS — M05.741 RHEUMATOID ARTHRITIS INVOLVING BOTH HANDS WITH POSITIVE RHEUMATOID FACTOR: ICD-10-CM

## 2019-01-01 DIAGNOSIS — R06.02 SHORTNESS OF BREATH: ICD-10-CM

## 2019-01-01 DIAGNOSIS — I50.32 HEART FAILURE, DIASTOLIC, CHRONIC: ICD-10-CM

## 2019-01-01 DIAGNOSIS — I48.91 ATRIAL FIBRILLATION WITH RVR: Primary | ICD-10-CM

## 2019-01-01 DIAGNOSIS — I48.0 PAROXYSMAL ATRIAL FIBRILLATION: ICD-10-CM

## 2019-01-01 DIAGNOSIS — I48.91 ATRIAL FIBRILLATION, UNSPECIFIED TYPE: ICD-10-CM

## 2019-01-01 DIAGNOSIS — R00.0 TACHYCARDIA: ICD-10-CM

## 2019-01-01 DIAGNOSIS — N18.2 TYPE 2 DIABETES MELLITUS WITH STAGE 2 CHRONIC KIDNEY DISEASE, WITHOUT LONG-TERM CURRENT USE OF INSULIN: ICD-10-CM

## 2019-01-01 DIAGNOSIS — Z79.01 CHRONIC ANTICOAGULATION: ICD-10-CM

## 2019-01-01 DIAGNOSIS — M05.742 RHEUMATOID ARTHRITIS INVOLVING BOTH HANDS WITH POSITIVE RHEUMATOID FACTOR: ICD-10-CM

## 2019-01-01 DIAGNOSIS — R74.8 ABNORMAL LIVER ENZYMES: ICD-10-CM

## 2019-01-01 DIAGNOSIS — I45.10 RIGHT BUNDLE BRANCH BLOCK: ICD-10-CM

## 2019-01-01 DIAGNOSIS — R07.9 CHEST PAIN: ICD-10-CM

## 2019-01-01 LAB
ALBUMIN SERPL BCP-MCNC: 3.2 G/DL (ref 3.5–5.2)
ALBUMIN SERPL BCP-MCNC: 3.3 G/DL (ref 3.5–5.2)
ALBUMIN SERPL BCP-MCNC: 3.4 G/DL (ref 3.5–5.2)
ALBUMIN SERPL BCP-MCNC: 3.6 G/DL (ref 3.5–5.2)
ALBUMIN SERPL BCP-MCNC: 3.8 G/DL (ref 3.5–5.2)
ALP SERPL-CCNC: 112 U/L (ref 55–135)
ALP SERPL-CCNC: 123 U/L (ref 55–135)
ALP SERPL-CCNC: 78 U/L (ref 55–135)
ALP SERPL-CCNC: 81 U/L (ref 55–135)
ALP SERPL-CCNC: 94 U/L (ref 55–135)
ALT SERPL W/O P-5'-P-CCNC: 117 U/L (ref 10–44)
ALT SERPL W/O P-5'-P-CCNC: 44 U/L (ref 10–44)
ALT SERPL W/O P-5'-P-CCNC: 52 U/L (ref 10–44)
ALT SERPL W/O P-5'-P-CCNC: 69 U/L (ref 10–44)
ALT SERPL W/O P-5'-P-CCNC: 93 U/L (ref 10–44)
ANION GAP SERPL CALC-SCNC: 11 MMOL/L (ref 8–16)
ANION GAP SERPL CALC-SCNC: 11 MMOL/L (ref 8–16)
ANION GAP SERPL CALC-SCNC: 12 MMOL/L (ref 8–16)
ANION GAP SERPL CALC-SCNC: 9 MMOL/L (ref 8–16)
ANION GAP SERPL CALC-SCNC: 9 MMOL/L (ref 8–16)
APTT BLDCRRT: 25.7 SEC (ref 21–32)
ASCENDING AORTA: 2.4 CM
AST SERPL-CCNC: 110 U/L (ref 10–40)
AST SERPL-CCNC: 33 U/L (ref 10–40)
AST SERPL-CCNC: 33 U/L (ref 10–40)
AST SERPL-CCNC: 40 U/L (ref 10–40)
AST SERPL-CCNC: 62 U/L (ref 10–40)
AV INDEX (PROSTH): 1.01
AV MEAN GRADIENT: 2 MMHG
AV PEAK GRADIENT: 3 MMHG
AV VALVE AREA: 3.03 CM2
AV VELOCITY RATIO: 0.95
BASOPHILS # BLD AUTO: 0.04 K/UL (ref 0–0.2)
BASOPHILS # BLD AUTO: 0.05 K/UL (ref 0–0.2)
BASOPHILS NFR BLD: 0.4 % (ref 0–1.9)
BASOPHILS NFR BLD: 0.5 % (ref 0–1.9)
BILIRUB SERPL-MCNC: 0.9 MG/DL (ref 0.1–1)
BILIRUB SERPL-MCNC: 0.9 MG/DL (ref 0.1–1)
BILIRUB SERPL-MCNC: 1 MG/DL (ref 0.1–1)
BILIRUB SERPL-MCNC: 1.1 MG/DL (ref 0.1–1)
BILIRUB SERPL-MCNC: 1.3 MG/DL (ref 0.1–1)
BNP SERPL-MCNC: 508 PG/ML (ref 0–99)
BSA FOR ECHO PROCEDURE: 1.64 M2
BSA FOR ECHO PROCEDURE: 1.64 M2
BUN SERPL-MCNC: 25 MG/DL (ref 8–23)
BUN SERPL-MCNC: 26 MG/DL (ref 8–23)
BUN SERPL-MCNC: 28 MG/DL (ref 8–23)
BUN SERPL-MCNC: 28 MG/DL (ref 8–23)
BUN SERPL-MCNC: 29 MG/DL (ref 8–23)
CALCIUM SERPL-MCNC: 9.1 MG/DL (ref 8.7–10.5)
CALCIUM SERPL-MCNC: 9.3 MG/DL (ref 8.7–10.5)
CALCIUM SERPL-MCNC: 9.3 MG/DL (ref 8.7–10.5)
CALCIUM SERPL-MCNC: 9.8 MG/DL (ref 8.7–10.5)
CALCIUM SERPL-MCNC: 9.9 MG/DL (ref 8.7–10.5)
CHLORIDE SERPL-SCNC: 102 MMOL/L (ref 95–110)
CHOLEST SERPL-MCNC: 118 MG/DL (ref 120–199)
CHOLEST/HDLC SERPL: 2.7 {RATIO} (ref 2–5)
CO2 SERPL-SCNC: 25 MMOL/L (ref 23–29)
CO2 SERPL-SCNC: 25 MMOL/L (ref 23–29)
CO2 SERPL-SCNC: 27 MMOL/L (ref 23–29)
CO2 SERPL-SCNC: 27 MMOL/L (ref 23–29)
CO2 SERPL-SCNC: 28 MMOL/L (ref 23–29)
CREAT SERPL-MCNC: 1.3 MG/DL (ref 0.5–1.4)
CREAT SERPL-MCNC: 1.4 MG/DL (ref 0.5–1.4)
CREAT SERPL-MCNC: 1.6 MG/DL (ref 0.5–1.4)
CV ECHO LV RWT: 0.76 CM
DIFFERENTIAL METHOD: ABNORMAL
DIFFERENTIAL METHOD: ABNORMAL
DOP CALC AO PEAK VEL: 0.88 M/S
DOP CALC AO VTI: 13.85 CM
DOP CALC LVOT AREA: 3 CM2
DOP CALC LVOT DIAMETER: 1.96 CM
DOP CALC LVOT PEAK VEL: 0.84 M/S
DOP CALC LVOT STROKE VOLUME: 42.01 CM3
DOP CALCLVOT PEAK VEL VTI: 13.93 CM
E WAVE DECELERATION TIME: 141.47 MSEC
E/E' RATIO: 55.67 M/S
ECHO LV POSTERIOR WALL: 1.31 CM (ref 0.6–1.1)
EOSINOPHIL # BLD AUTO: 0.1 K/UL (ref 0–0.5)
EOSINOPHIL # BLD AUTO: 0.4 K/UL (ref 0–0.5)
EOSINOPHIL NFR BLD: 1.1 % (ref 0–8)
EOSINOPHIL NFR BLD: 3.5 % (ref 0–8)
ERYTHROCYTE [DISTWIDTH] IN BLOOD BY AUTOMATED COUNT: 16.9 % (ref 11.5–14.5)
ERYTHROCYTE [DISTWIDTH] IN BLOOD BY AUTOMATED COUNT: 17 % (ref 11.5–14.5)
EST. GFR  (AFRICAN AMERICAN): 35 ML/MIN/1.73 M^2
EST. GFR  (AFRICAN AMERICAN): 42 ML/MIN/1.73 M^2
EST. GFR  (AFRICAN AMERICAN): 45 ML/MIN/1.73 M^2
EST. GFR  (NON AFRICAN AMERICAN): 31 ML/MIN/1.73 M^2
EST. GFR  (NON AFRICAN AMERICAN): 36 ML/MIN/1.73 M^2
EST. GFR  (NON AFRICAN AMERICAN): 39 ML/MIN/1.73 M^2
ESTIMATED AVG GLUCOSE: 192 MG/DL (ref 68–131)
FRACTIONAL SHORTENING: 26 % (ref 28–44)
GLUCOSE SERPL-MCNC: 114 MG/DL (ref 70–110)
GLUCOSE SERPL-MCNC: 193 MG/DL (ref 70–110)
GLUCOSE SERPL-MCNC: 213 MG/DL (ref 70–110)
GLUCOSE SERPL-MCNC: 218 MG/DL (ref 70–110)
GLUCOSE SERPL-MCNC: 228 MG/DL (ref 70–110)
HBA1C MFR BLD HPLC: 8.3 % (ref 4–5.6)
HCT VFR BLD AUTO: 31.3 % (ref 37–48.5)
HCT VFR BLD AUTO: 34 % (ref 37–48.5)
HDLC SERPL-MCNC: 44 MG/DL (ref 40–75)
HDLC SERPL: 37.3 % (ref 20–50)
HGB BLD-MCNC: 10.2 G/DL (ref 12–16)
HGB BLD-MCNC: 11.3 G/DL (ref 12–16)
IMM GRANULOCYTES # BLD AUTO: 0.07 K/UL (ref 0–0.04)
IMM GRANULOCYTES # BLD AUTO: 0.09 K/UL (ref 0–0.04)
IMM GRANULOCYTES NFR BLD AUTO: 0.6 % (ref 0–0.5)
IMM GRANULOCYTES NFR BLD AUTO: 1 % (ref 0–0.5)
INR PPP: 1.1 (ref 0.8–1.2)
INTERVENTRICULAR SEPTUM: 1.32 CM (ref 0.6–1.1)
LA MAJOR: 4.91 CM
LA MINOR: 4.94 CM
LA WIDTH: 4.22 CM
LDLC SERPL CALC-MCNC: 59 MG/DL (ref 63–159)
LEFT ATRIUM SIZE: 3.18 CM
LEFT ATRIUM VOLUME INDEX: 34.3 ML/M2
LEFT ATRIUM VOLUME: 56.18 CM3
LEFT INTERNAL DIMENSION IN SYSTOLE: 2.57 CM (ref 2.1–4)
LEFT VENTRICLE DIASTOLIC VOLUME INDEX: 30.14 ML/M2
LEFT VENTRICLE DIASTOLIC VOLUME: 49.38 ML
LEFT VENTRICLE MASS INDEX: 94 G/M2
LEFT VENTRICLE SYSTOLIC VOLUME INDEX: 14.6 ML/M2
LEFT VENTRICLE SYSTOLIC VOLUME: 23.99 ML
LEFT VENTRICULAR INTERNAL DIMENSION IN DIASTOLE: 3.46 CM (ref 3.5–6)
LEFT VENTRICULAR MASS: 154.06 G
LV LATERAL E/E' RATIO: 55.67 M/S
LV SEPTAL E/E' RATIO: 55.67 M/S
LYMPHOCYTES # BLD AUTO: 1.1 K/UL (ref 1–4.8)
LYMPHOCYTES # BLD AUTO: 2.4 K/UL (ref 1–4.8)
LYMPHOCYTES NFR BLD: 11.5 % (ref 18–48)
LYMPHOCYTES NFR BLD: 21.8 % (ref 18–48)
MAGNESIUM SERPL-MCNC: 1.5 MG/DL (ref 1.6–2.6)
MAGNESIUM SERPL-MCNC: 1.6 MG/DL (ref 1.6–2.6)
MAGNESIUM SERPL-MCNC: 1.6 MG/DL (ref 1.6–2.6)
MCH RBC QN AUTO: 31.4 PG (ref 27–31)
MCH RBC QN AUTO: 31.5 PG (ref 27–31)
MCHC RBC AUTO-ENTMCNC: 32.6 G/DL (ref 32–36)
MCHC RBC AUTO-ENTMCNC: 33.2 G/DL (ref 32–36)
MCV RBC AUTO: 94 FL (ref 82–98)
MCV RBC AUTO: 97 FL (ref 82–98)
MONOCYTES # BLD AUTO: 0.8 K/UL (ref 0.3–1)
MONOCYTES # BLD AUTO: 1 K/UL (ref 0.3–1)
MONOCYTES NFR BLD: 8.8 % (ref 4–15)
MONOCYTES NFR BLD: 9.2 % (ref 4–15)
MV PEAK E VEL: 1.67 M/S
MV STENOSIS PRESSURE HALF TIME: 41 MS
MV VALVE AREA P 1/2 METHOD: 5.37 CM2
NEUTROPHILS # BLD AUTO: 7 K/UL (ref 1.8–7.7)
NEUTROPHILS # BLD AUTO: 7.1 K/UL (ref 1.8–7.7)
NEUTROPHILS NFR BLD: 64.4 % (ref 38–73)
NEUTROPHILS NFR BLD: 77.2 % (ref 38–73)
NONHDLC SERPL-MCNC: 74 MG/DL
NRBC BLD-RTO: 0 /100 WBC
NRBC BLD-RTO: 0 /100 WBC
PHOSPHATE SERPL-MCNC: 2.9 MG/DL (ref 2.7–4.5)
PISA TR MAX VEL: 3.83 M/S
PLATELET # BLD AUTO: 149 K/UL (ref 150–350)
PLATELET # BLD AUTO: 162 K/UL (ref 150–350)
PMV BLD AUTO: 13.3 FL (ref 9.2–12.9)
PMV BLD AUTO: 14.1 FL (ref 9.2–12.9)
POCT GLUCOSE: 113 MG/DL (ref 70–110)
POCT GLUCOSE: 142 MG/DL (ref 70–110)
POCT GLUCOSE: 145 MG/DL (ref 70–110)
POCT GLUCOSE: 153 MG/DL (ref 70–110)
POCT GLUCOSE: 168 MG/DL (ref 70–110)
POCT GLUCOSE: 174 MG/DL (ref 70–110)
POCT GLUCOSE: 198 MG/DL (ref 70–110)
POCT GLUCOSE: 204 MG/DL (ref 70–110)
POCT GLUCOSE: 204 MG/DL (ref 70–110)
POCT GLUCOSE: 205 MG/DL (ref 70–110)
POCT GLUCOSE: 208 MG/DL (ref 70–110)
POCT GLUCOSE: 215 MG/DL (ref 70–110)
POCT GLUCOSE: 234 MG/DL (ref 70–110)
POCT GLUCOSE: 237 MG/DL (ref 70–110)
POCT GLUCOSE: 273 MG/DL (ref 70–110)
POTASSIUM SERPL-SCNC: 3.6 MMOL/L (ref 3.5–5.1)
POTASSIUM SERPL-SCNC: 3.7 MMOL/L (ref 3.5–5.1)
POTASSIUM SERPL-SCNC: 4.1 MMOL/L (ref 3.5–5.1)
POTASSIUM SERPL-SCNC: 4.3 MMOL/L (ref 3.5–5.1)
POTASSIUM SERPL-SCNC: 4.3 MMOL/L (ref 3.5–5.1)
PROT SERPL-MCNC: 6.4 G/DL (ref 6–8.4)
PROT SERPL-MCNC: 6.6 G/DL (ref 6–8.4)
PROT SERPL-MCNC: 6.8 G/DL (ref 6–8.4)
PROT SERPL-MCNC: 7.1 G/DL (ref 6–8.4)
PROT SERPL-MCNC: 7.5 G/DL (ref 6–8.4)
PROTHROMBIN TIME: 11.9 SEC (ref 9–12.5)
PV PEAK VELOCITY: 0.63 CM/S
RA MAJOR: 4.47 CM
RA PRESSURE: 8 MMHG
RA WIDTH: 3.72 CM
RBC # BLD AUTO: 3.24 M/UL (ref 4–5.4)
RBC # BLD AUTO: 3.6 M/UL (ref 4–5.4)
RIGHT VENTRICULAR END-DIASTOLIC DIMENSION: 3.39 CM
SINUS: 2.68 CM
SODIUM SERPL-SCNC: 138 MMOL/L (ref 136–145)
SODIUM SERPL-SCNC: 138 MMOL/L (ref 136–145)
SODIUM SERPL-SCNC: 139 MMOL/L (ref 136–145)
SODIUM SERPL-SCNC: 139 MMOL/L (ref 136–145)
SODIUM SERPL-SCNC: 140 MMOL/L (ref 136–145)
STJ: 2.37 CM
T4 FREE SERPL-MCNC: 1.06 NG/DL (ref 0.71–1.51)
TDI LATERAL: 0.03 M/S
TDI SEPTAL: 0.03 M/S
TDI: 0.03 M/S
TR MAX PG: 59 MMHG
TRICUSPID ANNULAR PLANE SYSTOLIC EXCURSION: 0.85 CM
TRIGL SERPL-MCNC: 75 MG/DL (ref 30–150)
TROPONIN I SERPL DL<=0.01 NG/ML-MCNC: 0.08 NG/ML (ref 0–0.03)
TROPONIN I SERPL DL<=0.01 NG/ML-MCNC: 0.09 NG/ML (ref 0–0.03)
TROPONIN I SERPL DL<=0.01 NG/ML-MCNC: 0.1 NG/ML (ref 0–0.03)
TSH SERPL DL<=0.005 MIU/L-ACNC: 2.22 UIU/ML (ref 0.4–4)
TV REST PULMONARY ARTERY PRESSURE: 67 MMHG
WBC # BLD AUTO: 10.82 K/UL (ref 3.9–12.7)
WBC # BLD AUTO: 9.16 K/UL (ref 3.9–12.7)

## 2019-01-01 PROCEDURE — 94761 N-INVAS EAR/PLS OXIMETRY MLT: CPT

## 2019-01-01 PROCEDURE — 85025 COMPLETE CBC W/AUTO DIFF WBC: CPT

## 2019-01-01 PROCEDURE — 84443 ASSAY THYROID STIM HORMONE: CPT

## 2019-01-01 PROCEDURE — 83036 HEMOGLOBIN GLYCOSYLATED A1C: CPT

## 2019-01-01 PROCEDURE — 99233 SBSQ HOSP IP/OBS HIGH 50: CPT | Mod: ,,, | Performed by: HOSPITALIST

## 2019-01-01 PROCEDURE — 80061 LIPID PANEL: CPT

## 2019-01-01 PROCEDURE — 99233 SBSQ HOSP IP/OBS HIGH 50: CPT | Mod: ,,, | Performed by: INTERNAL MEDICINE

## 2019-01-01 PROCEDURE — 99900035 HC TECH TIME PER 15 MIN (STAT)

## 2019-01-01 PROCEDURE — 25000003 PHARM REV CODE 250: Performed by: INTERNAL MEDICINE

## 2019-01-01 PROCEDURE — 36415 COLL VENOUS BLD VENIPUNCTURE: CPT

## 2019-01-01 PROCEDURE — 83735 ASSAY OF MAGNESIUM: CPT

## 2019-01-01 PROCEDURE — 99152 MOD SED SAME PHYS/QHP 5/>YRS: CPT | Performed by: INTERNAL MEDICINE

## 2019-01-01 PROCEDURE — 99233 PR SUBSEQUENT HOSPITAL CARE,LEVL III: ICD-10-PCS | Mod: ,,, | Performed by: INTERNAL MEDICINE

## 2019-01-01 PROCEDURE — 83880 ASSAY OF NATRIURETIC PEPTIDE: CPT

## 2019-01-01 PROCEDURE — 99239 HOSP IP/OBS DSCHRG MGMT >30: CPT | Mod: ,,, | Performed by: INTERNAL MEDICINE

## 2019-01-01 PROCEDURE — 80053 COMPREHEN METABOLIC PANEL: CPT

## 2019-01-01 PROCEDURE — 63600175 PHARM REV CODE 636 W HCPCS: Performed by: HOSPITALIST

## 2019-01-01 PROCEDURE — 11000001 HC ACUTE MED/SURG PRIVATE ROOM

## 2019-01-01 PROCEDURE — 99223 1ST HOSP IP/OBS HIGH 75: CPT | Mod: ,,, | Performed by: HOSPITALIST

## 2019-01-01 PROCEDURE — 99214 PR OFFICE/OUTPT VISIT, EST, LEVL IV, 30-39 MIN: ICD-10-PCS | Mod: 25,S$GLB,, | Performed by: INTERNAL MEDICINE

## 2019-01-01 PROCEDURE — 99223 PR INITIAL HOSPITAL CARE,LEVL III: ICD-10-PCS | Mod: ,,, | Performed by: HOSPITALIST

## 2019-01-01 PROCEDURE — 84100 ASSAY OF PHOSPHORUS: CPT

## 2019-01-01 PROCEDURE — 93005 ELECTROCARDIOGRAM TRACING: CPT

## 2019-01-01 PROCEDURE — S5571 INSULIN DISPOS PEN 3 ML: HCPCS | Performed by: HOSPITALIST

## 2019-01-01 PROCEDURE — 25000003 PHARM REV CODE 250: Performed by: HOSPITALIST

## 2019-01-01 PROCEDURE — 37000008 HC ANESTHESIA 1ST 15 MINUTES: Performed by: ANESTHESIOLOGY

## 2019-01-01 PROCEDURE — 93010 ELECTROCARDIOGRAM REPORT: CPT | Mod: 76,,, | Performed by: INTERNAL MEDICINE

## 2019-01-01 PROCEDURE — 84484 ASSAY OF TROPONIN QUANT: CPT

## 2019-01-01 PROCEDURE — 93010 ELECTROCARDIOGRAM REPORT: CPT | Mod: ,,, | Performed by: INTERNAL MEDICINE

## 2019-01-01 PROCEDURE — 3078F PR MOST RECENT DIASTOLIC BLOOD PRESSURE < 80 MM HG: ICD-10-PCS | Mod: CPTII,S$GLB,, | Performed by: INTERNAL MEDICINE

## 2019-01-01 PROCEDURE — 93010 EKG 12-LEAD: ICD-10-PCS | Mod: 76,,, | Performed by: INTERNAL MEDICINE

## 2019-01-01 PROCEDURE — 63600175 PHARM REV CODE 636 W HCPCS: Performed by: NURSE PRACTITIONER

## 2019-01-01 PROCEDURE — 63600175 PHARM REV CODE 636 W HCPCS: Performed by: INTERNAL MEDICINE

## 2019-01-01 PROCEDURE — 93000 PR ELECTROCARDIOGRAM, COMPLETE: ICD-10-PCS | Mod: S$GLB,,, | Performed by: INTERNAL MEDICINE

## 2019-01-01 PROCEDURE — 99214 OFFICE O/P EST MOD 30 MIN: CPT | Mod: 25,S$GLB,, | Performed by: INTERNAL MEDICINE

## 2019-01-01 PROCEDURE — 85730 THROMBOPLASTIN TIME PARTIAL: CPT

## 2019-01-01 PROCEDURE — 3075F PR MOST RECENT SYSTOLIC BLOOD PRESS GE 130-139MM HG: ICD-10-PCS | Mod: CPTII,S$GLB,, | Performed by: INTERNAL MEDICINE

## 2019-01-01 PROCEDURE — 99239 PR HOSPITAL DISCHARGE DAY,>30 MIN: ICD-10-PCS | Mod: ,,, | Performed by: INTERNAL MEDICINE

## 2019-01-01 PROCEDURE — 25000003 PHARM REV CODE 250: Performed by: EMERGENCY MEDICINE

## 2019-01-01 PROCEDURE — 3075F SYST BP GE 130 - 139MM HG: CPT | Mod: CPTII,S$GLB,, | Performed by: INTERNAL MEDICINE

## 2019-01-01 PROCEDURE — 36000 PLACE NEEDLE IN VEIN: CPT

## 2019-01-01 PROCEDURE — 84439 ASSAY OF FREE THYROXINE: CPT

## 2019-01-01 PROCEDURE — 84484 ASSAY OF TROPONIN QUANT: CPT | Mod: 91

## 2019-01-01 PROCEDURE — 99233 PR SUBSEQUENT HOSPITAL CARE,LEVL III: ICD-10-PCS | Mod: ,,, | Performed by: HOSPITALIST

## 2019-01-01 PROCEDURE — 27000221 HC OXYGEN, UP TO 24 HOURS

## 2019-01-01 PROCEDURE — 99291 CRITICAL CARE FIRST HOUR: CPT | Mod: 25

## 2019-01-01 PROCEDURE — 93000 ELECTROCARDIOGRAM COMPLETE: CPT | Mod: S$GLB,,, | Performed by: INTERNAL MEDICINE

## 2019-01-01 PROCEDURE — 63600175 PHARM REV CODE 636 W HCPCS: Performed by: NURSE ANESTHETIST, CERTIFIED REGISTERED

## 2019-01-01 PROCEDURE — 85610 PROTHROMBIN TIME: CPT

## 2019-01-01 PROCEDURE — 3078F DIAST BP <80 MM HG: CPT | Mod: CPTII,S$GLB,, | Performed by: INTERNAL MEDICINE

## 2019-01-01 PROCEDURE — 37000009 HC ANESTHESIA EA ADD 15 MINS: Performed by: ANESTHESIOLOGY

## 2019-01-01 PROCEDURE — 92960 CARDIOVERSION ELECTRIC EXT: CPT | Performed by: INTERNAL MEDICINE

## 2019-01-01 RX ORDER — POTASSIUM CHLORIDE 20 MEQ/1
20 TABLET, EXTENDED RELEASE ORAL ONCE
Status: COMPLETED | OUTPATIENT
Start: 2019-01-01 | End: 2019-01-01

## 2019-01-01 RX ORDER — APIXABAN 5 MG/1
TABLET, FILM COATED ORAL
Qty: 60 TABLET | Refills: 0 | OUTPATIENT
Start: 2019-01-01

## 2019-01-01 RX ORDER — METOPROLOL SUCCINATE 50 MG/1
200 TABLET, EXTENDED RELEASE ORAL DAILY
Status: DISCONTINUED | OUTPATIENT
Start: 2019-01-01 | End: 2019-01-01 | Stop reason: HOSPADM

## 2019-01-01 RX ORDER — METOPROLOL TARTRATE 1 MG/ML
5 INJECTION, SOLUTION INTRAVENOUS EVERY 5 MIN PRN
Status: COMPLETED | OUTPATIENT
Start: 2019-01-01 | End: 2019-01-01

## 2019-01-01 RX ORDER — GLUCAGON 1 MG
1 KIT INJECTION
Status: DISCONTINUED | OUTPATIENT
Start: 2019-01-01 | End: 2019-01-01

## 2019-01-01 RX ORDER — ASPIRIN 81 MG/1
81 TABLET ORAL DAILY
Status: CANCELLED | OUTPATIENT
Start: 2019-01-01

## 2019-01-01 RX ORDER — BENAZEPRIL HYDROCHLORIDE 10 MG/1
20 TABLET ORAL DAILY
Status: DISCONTINUED | OUTPATIENT
Start: 2019-01-01 | End: 2019-01-01

## 2019-01-01 RX ORDER — METOPROLOL SUCCINATE 200 MG/1
200 TABLET, EXTENDED RELEASE ORAL DAILY
Qty: 90 TABLET | Refills: 3 | Status: SHIPPED | OUTPATIENT
Start: 2019-01-01

## 2019-01-01 RX ORDER — ACETAMINOPHEN 325 MG/1
650 TABLET ORAL EVERY 4 HOURS PRN
Refills: 0 | COMMUNITY
Start: 2019-01-01

## 2019-01-01 RX ORDER — FUROSEMIDE 10 MG/ML
40 INJECTION INTRAMUSCULAR; INTRAVENOUS DAILY
Status: DISCONTINUED | OUTPATIENT
Start: 2019-01-01 | End: 2019-01-01

## 2019-01-01 RX ORDER — FUROSEMIDE 20 MG/1
20 TABLET ORAL DAILY
Status: DISCONTINUED | OUTPATIENT
Start: 2019-01-01 | End: 2019-01-01

## 2019-01-01 RX ORDER — BENAZEPRIL HYDROCHLORIDE 20 MG/1
TABLET ORAL
Qty: 90 TABLET | Refills: 0 | Status: SHIPPED | OUTPATIENT
Start: 2019-01-01 | End: 2020-01-01 | Stop reason: CLARIF

## 2019-01-01 RX ORDER — BENAZEPRIL HYDROCHLORIDE 20 MG/1
TABLET ORAL
Qty: 90 TABLET | Refills: 0 | Status: SHIPPED | OUTPATIENT
Start: 2019-01-01 | End: 2019-01-01 | Stop reason: SDUPTHER

## 2019-01-01 RX ORDER — ATORVASTATIN CALCIUM 20 MG/1
TABLET, FILM COATED ORAL
Qty: 90 TABLET | Refills: 0 | Status: SHIPPED | OUTPATIENT
Start: 2019-01-01 | End: 2019-01-01 | Stop reason: SDUPTHER

## 2019-01-01 RX ORDER — ONDANSETRON 2 MG/ML
INJECTION INTRAMUSCULAR; INTRAVENOUS
Status: DISCONTINUED | OUTPATIENT
Start: 2019-01-01 | End: 2019-01-01

## 2019-01-01 RX ORDER — METOPROLOL SUCCINATE 200 MG/1
TABLET, EXTENDED RELEASE ORAL
Qty: 90 TABLET | Refills: 1 | Status: SHIPPED | OUTPATIENT
Start: 2019-01-01 | End: 2019-01-01 | Stop reason: SDUPTHER

## 2019-01-01 RX ORDER — SODIUM CHLORIDE 0.9 % (FLUSH) 0.9 %
10 SYRINGE (ML) INJECTION
Status: DISCONTINUED | OUTPATIENT
Start: 2019-01-01 | End: 2019-01-01 | Stop reason: HOSPADM

## 2019-01-01 RX ORDER — FUROSEMIDE 40 MG/1
40 TABLET ORAL DAILY
Qty: 30 TABLET | Refills: 1 | Status: SHIPPED | OUTPATIENT
Start: 2019-01-01 | End: 2019-01-01 | Stop reason: SDUPTHER

## 2019-01-01 RX ORDER — PROPOFOL 10 MG/ML
VIAL (ML) INTRAVENOUS
Status: DISCONTINUED | OUTPATIENT
Start: 2019-01-01 | End: 2019-01-01

## 2019-01-01 RX ORDER — ATORVASTATIN CALCIUM 20 MG/1
20 TABLET, FILM COATED ORAL DAILY
Qty: 90 TABLET | Refills: 3 | Status: SHIPPED | OUTPATIENT
Start: 2019-01-01

## 2019-01-01 RX ORDER — MIDAZOLAM HYDROCHLORIDE 1 MG/ML
INJECTION, SOLUTION INTRAMUSCULAR; INTRAVENOUS
Status: DISCONTINUED | OUTPATIENT
Start: 2019-01-01 | End: 2019-01-01 | Stop reason: HOSPADM

## 2019-01-01 RX ORDER — LANOLIN ALCOHOL/MO/W.PET/CERES
400 CREAM (GRAM) TOPICAL 2 TIMES DAILY
Refills: 0 | COMMUNITY
Start: 2019-01-01 | End: 2019-01-01

## 2019-01-01 RX ORDER — FUROSEMIDE 40 MG/1
40 TABLET ORAL DAILY
Status: DISCONTINUED | OUTPATIENT
Start: 2019-01-01 | End: 2019-01-01 | Stop reason: HOSPADM

## 2019-01-01 RX ORDER — FUROSEMIDE 40 MG/1
40 TABLET ORAL DAILY
Qty: 120 TABLET | Refills: 3 | Status: SHIPPED | OUTPATIENT
Start: 2019-01-01

## 2019-01-01 RX ORDER — BENAZEPRIL HYDROCHLORIDE 20 MG/1
20 TABLET ORAL DAILY
Qty: 90 TABLET | Refills: 3 | Status: SHIPPED | OUTPATIENT
Start: 2019-01-01

## 2019-01-01 RX ORDER — FUROSEMIDE 40 MG/1
TABLET ORAL
Qty: 90 TABLET | Refills: 1 | Status: SHIPPED | OUTPATIENT
Start: 2019-01-01 | End: 2019-01-01 | Stop reason: SDUPTHER

## 2019-01-01 RX ORDER — ACETAMINOPHEN 325 MG/1
650 TABLET ORAL EVERY 4 HOURS PRN
Status: DISCONTINUED | OUTPATIENT
Start: 2019-01-01 | End: 2019-01-01 | Stop reason: HOSPADM

## 2019-01-01 RX ORDER — OXYMETAZOLINE HCL 0.05 %
2 SPRAY, NON-AEROSOL (ML) NASAL 2 TIMES DAILY PRN
Refills: 0 | COMMUNITY
Start: 2019-01-01

## 2019-01-01 RX ORDER — LANOLIN ALCOHOL/MO/W.PET/CERES
400 CREAM (GRAM) TOPICAL 2 TIMES DAILY
Status: DISCONTINUED | OUTPATIENT
Start: 2019-01-01 | End: 2019-01-01 | Stop reason: HOSPADM

## 2019-01-01 RX ORDER — METOPROLOL SUCCINATE 50 MG/1
100 TABLET, EXTENDED RELEASE ORAL ONCE
Status: COMPLETED | OUTPATIENT
Start: 2019-01-01 | End: 2019-01-01

## 2019-01-01 RX ORDER — BENAZEPRIL HYDROCHLORIDE 10 MG/1
20 TABLET ORAL DAILY
Status: DISCONTINUED | OUTPATIENT
Start: 2019-01-01 | End: 2019-01-01 | Stop reason: HOSPADM

## 2019-01-01 RX ORDER — ONDANSETRON 2 MG/ML
4 INJECTION INTRAMUSCULAR; INTRAVENOUS EVERY 8 HOURS PRN
Status: DISCONTINUED | OUTPATIENT
Start: 2019-01-01 | End: 2019-01-01 | Stop reason: HOSPADM

## 2019-01-01 RX ORDER — AMOXICILLIN 500 MG
1 CAPSULE ORAL DAILY
COMMUNITY

## 2019-01-01 RX ORDER — FUROSEMIDE 20 MG/1
TABLET ORAL
Qty: 90 TABLET | Refills: 0 | Status: SHIPPED | OUTPATIENT
Start: 2019-01-01 | End: 2020-01-01

## 2019-01-01 RX ORDER — INSULIN GLARGINE 100 [IU]/ML
12 INJECTION, SOLUTION SUBCUTANEOUS DAILY
Refills: 0
Start: 2019-01-01 | End: 2020-05-12

## 2019-01-01 RX ORDER — METOPROLOL SUCCINATE 200 MG/1
200 TABLET, EXTENDED RELEASE ORAL DAILY
Qty: 30 TABLET | Refills: 1 | Status: SHIPPED | OUTPATIENT
Start: 2019-01-01 | End: 2019-01-01 | Stop reason: SDUPTHER

## 2019-01-01 RX ORDER — IBUPROFEN 200 MG
24 TABLET ORAL
Status: DISCONTINUED | OUTPATIENT
Start: 2019-01-01 | End: 2019-01-01 | Stop reason: HOSPADM

## 2019-01-01 RX ORDER — INSULIN ASPART 100 [IU]/ML
0-5 INJECTION, SOLUTION INTRAVENOUS; SUBCUTANEOUS
Status: DISCONTINUED | OUTPATIENT
Start: 2019-01-01 | End: 2019-01-01

## 2019-01-01 RX ORDER — FENTANYL CITRATE 50 UG/ML
INJECTION, SOLUTION INTRAMUSCULAR; INTRAVENOUS
Status: DISCONTINUED | OUTPATIENT
Start: 2019-01-01 | End: 2019-01-01 | Stop reason: HOSPADM

## 2019-01-01 RX ORDER — INSULIN ASPART 100 [IU]/ML
0-5 INJECTION, SOLUTION INTRAVENOUS; SUBCUTANEOUS EVERY 6 HOURS PRN
Status: DISCONTINUED | OUTPATIENT
Start: 2019-01-01 | End: 2019-01-01 | Stop reason: HOSPADM

## 2019-01-01 RX ORDER — GLUCAGON 1 MG
1 KIT INJECTION
Status: DISCONTINUED | OUTPATIENT
Start: 2019-01-01 | End: 2019-01-01 | Stop reason: HOSPADM

## 2019-01-01 RX ORDER — ATORVASTATIN CALCIUM 20 MG/1
20 TABLET, FILM COATED ORAL DAILY
Status: DISCONTINUED | OUTPATIENT
Start: 2019-01-01 | End: 2019-01-01

## 2019-01-01 RX ORDER — IPRATROPIUM BROMIDE AND ALBUTEROL SULFATE 2.5; .5 MG/3ML; MG/3ML
3 SOLUTION RESPIRATORY (INHALATION) EVERY 4 HOURS PRN
Status: DISCONTINUED | OUTPATIENT
Start: 2019-01-01 | End: 2019-01-01 | Stop reason: HOSPADM

## 2019-01-01 RX ORDER — IBUPROFEN 200 MG
16 TABLET ORAL
Status: DISCONTINUED | OUTPATIENT
Start: 2019-01-01 | End: 2019-01-01 | Stop reason: HOSPADM

## 2019-01-01 RX ORDER — RAMELTEON 8 MG/1
8 TABLET ORAL NIGHTLY PRN
Status: DISCONTINUED | OUTPATIENT
Start: 2019-01-01 | End: 2019-01-01 | Stop reason: HOSPADM

## 2019-01-01 RX ADMIN — APIXABAN 5 MG: 2.5 TABLET, FILM COATED ORAL at 09:09

## 2019-01-01 RX ADMIN — METOPROLOL SUCCINATE 200 MG: 50 TABLET, EXTENDED RELEASE ORAL at 09:09

## 2019-01-01 RX ADMIN — FUROSEMIDE 40 MG: 10 INJECTION, SOLUTION INTRAMUSCULAR; INTRAVENOUS at 09:09

## 2019-01-01 RX ADMIN — POTASSIUM CHLORIDE 20 MEQ: 1500 TABLET, EXTENDED RELEASE ORAL at 09:09

## 2019-01-01 RX ADMIN — APIXABAN 5 MG: 2.5 TABLET, FILM COATED ORAL at 08:09

## 2019-01-01 RX ADMIN — INSULIN DETEMIR 10 UNITS: 100 INJECTION, SOLUTION SUBCUTANEOUS at 09:09

## 2019-01-01 RX ADMIN — PROPOFOL 50 MG: 10 INJECTION, EMULSION INTRAVENOUS at 05:09

## 2019-01-01 RX ADMIN — FUROSEMIDE 40 MG: 40 TABLET ORAL at 08:09

## 2019-01-01 RX ADMIN — INSULIN ASPART 2 UNITS: 100 INJECTION, SOLUTION INTRAVENOUS; SUBCUTANEOUS at 09:09

## 2019-01-01 RX ADMIN — BENAZEPRIL HYDROCHLORIDE 20 MG: 10 TABLET ORAL at 08:09

## 2019-01-01 RX ADMIN — METOPROLOL TARTRATE 5 MG: 1 INJECTION, SOLUTION INTRAVENOUS at 06:09

## 2019-01-01 RX ADMIN — METOPROLOL TARTRATE 5 MG: 1 INJECTION, SOLUTION INTRAVENOUS at 05:09

## 2019-01-01 RX ADMIN — METOPROLOL SUCCINATE 200 MG: 50 TABLET, EXTENDED RELEASE ORAL at 08:09

## 2019-01-01 RX ADMIN — Medication 400 MG: at 09:09

## 2019-01-01 RX ADMIN — BENAZEPRIL HYDROCHLORIDE 20 MG: 10 TABLET ORAL at 03:09

## 2019-01-01 RX ADMIN — ONDANSETRON 4 MG: 2 INJECTION INTRAMUSCULAR; INTRAVENOUS at 05:09

## 2019-01-01 RX ADMIN — METOPROLOL SUCCINATE 100 MG: 50 TABLET, EXTENDED RELEASE ORAL at 03:09

## 2019-01-01 RX ADMIN — INSULIN DETEMIR 10 UNITS: 100 INJECTION, SOLUTION SUBCUTANEOUS at 08:09

## 2019-01-01 RX ADMIN — METOPROLOL TARTRATE 5 MG: 1 INJECTION, SOLUTION INTRAVENOUS at 02:09

## 2019-01-01 RX ADMIN — APIXABAN 5 MG: 2.5 TABLET, FILM COATED ORAL at 07:09

## 2019-01-01 RX ADMIN — INSULIN ASPART 2 UNITS: 100 INJECTION, SOLUTION INTRAVENOUS; SUBCUTANEOUS at 02:09

## 2019-01-01 RX ADMIN — POTASSIUM CHLORIDE 20 MEQ: 1500 TABLET, EXTENDED RELEASE ORAL at 02:09

## 2019-04-30 ENCOUNTER — OFFICE VISIT (OUTPATIENT)
Dept: CARDIOLOGY | Facility: CLINIC | Age: 79
End: 2019-04-30
Attending: INTERNAL MEDICINE
Payer: MEDICARE

## 2019-04-30 VITALS
BODY MASS INDEX: 22.68 KG/M2 | SYSTOLIC BLOOD PRESSURE: 111 MMHG | WEIGHT: 128 LBS | HEIGHT: 63 IN | HEART RATE: 80 BPM | DIASTOLIC BLOOD PRESSURE: 53 MMHG

## 2019-04-30 DIAGNOSIS — I45.10 RIGHT BUNDLE BRANCH BLOCK: ICD-10-CM

## 2019-04-30 DIAGNOSIS — I10 ESSENTIAL HYPERTENSION: ICD-10-CM

## 2019-04-30 DIAGNOSIS — N18.2 CHRONIC KIDNEY DISEASE, STAGE II (MILD): ICD-10-CM

## 2019-04-30 DIAGNOSIS — M05.742 RHEUMATOID ARTHRITIS INVOLVING BOTH HANDS WITH POSITIVE RHEUMATOID FACTOR: ICD-10-CM

## 2019-04-30 DIAGNOSIS — N18.2 TYPE 2 DIABETES MELLITUS WITH STAGE 2 CHRONIC KIDNEY DISEASE, WITHOUT LONG-TERM CURRENT USE OF INSULIN: ICD-10-CM

## 2019-04-30 DIAGNOSIS — I50.32 HEART FAILURE, DIASTOLIC, CHRONIC: ICD-10-CM

## 2019-04-30 DIAGNOSIS — R06.02 SHORTNESS OF BREATH: ICD-10-CM

## 2019-04-30 DIAGNOSIS — E11.22 TYPE 2 DIABETES MELLITUS WITH STAGE 2 CHRONIC KIDNEY DISEASE, WITHOUT LONG-TERM CURRENT USE OF INSULIN: ICD-10-CM

## 2019-04-30 DIAGNOSIS — E78.00 HYPERCHOLESTEROLEMIA: ICD-10-CM

## 2019-04-30 DIAGNOSIS — M05.741 RHEUMATOID ARTHRITIS INVOLVING BOTH HANDS WITH POSITIVE RHEUMATOID FACTOR: ICD-10-CM

## 2019-04-30 PROCEDURE — 3074F SYST BP LT 130 MM HG: CPT | Mod: CPTII,S$GLB,, | Performed by: INTERNAL MEDICINE

## 2019-04-30 PROCEDURE — 99214 OFFICE O/P EST MOD 30 MIN: CPT | Mod: S$GLB,,, | Performed by: INTERNAL MEDICINE

## 2019-04-30 PROCEDURE — 3078F PR MOST RECENT DIASTOLIC BLOOD PRESSURE < 80 MM HG: ICD-10-PCS | Mod: CPTII,S$GLB,, | Performed by: INTERNAL MEDICINE

## 2019-04-30 PROCEDURE — 3074F PR MOST RECENT SYSTOLIC BLOOD PRESSURE < 130 MM HG: ICD-10-PCS | Mod: CPTII,S$GLB,, | Performed by: INTERNAL MEDICINE

## 2019-04-30 PROCEDURE — 3078F DIAST BP <80 MM HG: CPT | Mod: CPTII,S$GLB,, | Performed by: INTERNAL MEDICINE

## 2019-04-30 PROCEDURE — 99214 PR OFFICE/OUTPT VISIT, EST, LEVL IV, 30-39 MIN: ICD-10-PCS | Mod: S$GLB,,, | Performed by: INTERNAL MEDICINE

## 2019-04-30 RX ORDER — INSULIN GLARGINE 100 [IU]/ML
10 INJECTION, SOLUTION SUBCUTANEOUS DAILY
Status: ON HOLD | COMMUNITY
Start: 2019-01-14 | End: 2019-01-01

## 2019-04-30 RX ORDER — FUROSEMIDE 20 MG/1
20 TABLET ORAL DAILY
Qty: 120 TABLET | Refills: 3 | Status: ON HOLD | OUTPATIENT
Start: 2019-04-30 | End: 2019-01-01 | Stop reason: HOSPADM

## 2019-04-30 RX ORDER — ASPIRIN 81 MG/1
81 TABLET ORAL DAILY
Qty: 90 TABLET | Refills: 3 | Status: ON HOLD | COMMUNITY
Start: 2019-04-30 | End: 2019-01-01 | Stop reason: HOSPADM

## 2019-04-30 RX ORDER — BENAZEPRIL HYDROCHLORIDE 20 MG/1
20 TABLET ORAL DAILY
Qty: 90 TABLET | Refills: 3 | Status: SHIPPED | OUTPATIENT
Start: 2019-04-30 | End: 2019-01-01 | Stop reason: SDUPTHER

## 2019-04-30 RX ORDER — ATORVASTATIN CALCIUM 20 MG/1
20 TABLET, FILM COATED ORAL DAILY
Qty: 90 TABLET | Refills: 3 | Status: SHIPPED | OUTPATIENT
Start: 2019-04-30 | End: 2019-01-01 | Stop reason: SDUPTHER

## 2019-04-30 NOTE — PROGRESS NOTES
Subjective:     Sandra Oseguera is a 79 y.o. female with hypertension, hypercholesterolemia and diabetes mellitus type 2. She has rheumatoid arthritis involving knees, hands and back. In the summer of 2013 she developed progressive exertional dyspnea occasionally associated with mild chest pressure. She had an Echocardiogram that revealed findings consistent with hypertensive heart disease with a high LVEDP. She had low exercise tolerance on a Stress MPI but no defects. She was began on furosemide and after that she was breathing better. She had no exertional chest pain but mild to moderate exertional dyspnea if walking fast but no wheezing. Her heart rate was 70-80 bpm at rest and no longer raced with activities. As it appeared her exertional dyspnea was out of proportion to her cardiac findings she was referrred for PFTs that were done on 1/13/2017. They revealed small airway obstruction without significant response to inhalers. She saw Dr. Jarek Wade and was prescribed Anoro inhalers and a rescue inhaler. She also got enrolled in pulmonary rehabilitation and did 25 sessions. She is now breathing better. She actually thinks her legs holds her back more than the breathing. No palpitations or weak spells. Feeling well overall.      Congestive Heart Failure   Presents for follow-up visit. The disease course has been stable. Pertinent negatives include no abdominal pain, chest pain, chest pressure, claudication, edema, fatigue, muscle weakness, near-syncope, nocturia, orthopnea, palpitations, paroxysmal nocturnal dyspnea, shortness of breath or unexpected weight change. The symptoms have been stable.   Shortness of Breath   This is a chronic problem. The current episode started more than 1 year ago. The problem occurs daily. Pertinent negatives include no abdominal pain, chest pain, claudication, coryza, ear pain, fever, headaches, hemoptysis, leg pain (entire legs), leg swelling, neck pain, orthopnea, PND,  rash, rhinorrhea, sore throat, sputum production, swollen glands, syncope, vomiting or wheezing.   Hypertension   This is a chronic problem. The current episode started more than 1 year ago. The problem is unchanged. The problem is controlled (usually 120-130/60-70 mmHg at home). Pertinent negatives include no anxiety, blurred vision, chest pain, headaches, malaise/fatigue, neck pain, orthopnea, palpitations, peripheral edema, PND, shortness of breath or sweats. Identifiable causes of hypertension include chronic renal disease.   Hyperlipidemia   This is a chronic problem. The current episode started more than 1 year ago. The problem is controlled. Recent lipid tests were reviewed and are normal. Exacerbating diseases include chronic renal disease and diabetes. She has no history of hypothyroidism, liver disease, obesity or nephrotic syndrome. Pertinent negatives include no chest pain, focal sensory loss, focal weakness, leg pain (entire legs), myalgias or shortness of breath.       Review of Systems   Constitution: Negative for fatigue, fever, malaise/fatigue, unexpected weight change, weight gain and weight loss.   HENT: Negative for ear pain, nosebleeds, rhinorrhea and sore throat.    Eyes: Negative for blurred vision, pain and redness.   Cardiovascular: Positive for dyspnea on exertion. Negative for chest pain, claudication, irregular heartbeat, leg swelling, near-syncope, orthopnea, palpitations, paroxysmal nocturnal dyspnea and syncope.   Respiratory: Negative for cough, hemoptysis, shortness of breath, sputum production and wheezing.    Endocrine: Negative for cold intolerance and heat intolerance.   Hematologic/Lymphatic: Negative for bleeding problem. Does not bruise/bleed easily.   Skin: Negative for color change and rash.   Musculoskeletal: Positive for arthritis, back pain and joint pain. Negative for falls (8/2016: Fell going up steps), muscle weakness, myalgias and neck pain.   Gastrointestinal:  Negative for abdominal pain, heartburn, hematemesis, hematochezia, hemorrhoids, jaundice, melena, nausea and vomiting.   Genitourinary: Negative for dysuria, hematuria, menorrhagia and nocturia.   Neurological: Negative for difficulty with concentration, dizziness, focal weakness, headaches, light-headedness, loss of balance, numbness, paresthesias, tremors and vertigo.   Psychiatric/Behavioral: Negative for altered mental status, depression and memory loss. The patient is not nervous/anxious.    Allergic/Immunologic: Negative for hives and persistent infections.       Current Outpatient Medications on File Prior to Visit   Medication Sig Dispense Refill    ANORO ELLIPTA 62.5-25 mcg/actuation DsDv       aspirin (ECOTRIN) 81 MG EC tablet Take 1 tablet (81 mg total) by mouth once daily. 90 tablet 3    atorvastatin (LIPITOR) 20 MG tablet Take 1 tablet (20 mg total) by mouth once daily. 90 tablet 3    benazepril (LOTENSIN) 20 MG tablet Take 1 tablet (20 mg total) by mouth once daily. 90 tablet 3    DOCOSAHEXANOIC ACID/EPA (FISH OIL ORAL) Take 1,200 mg by mouth.      folic acid (FOLVITE) 1 MG tablet TK 1 T PO QD  3    furosemide (LASIX) 20 MG tablet Take 1 tablet (20 mg total) by mouth once daily. 90 tablet 3    gabapentin (NEURONTIN) 300 MG capsule TK 1 C PO D HS  2    insulin (BASAGLAR KWIKPEN U-100 INSULIN) glargine 100 units/mL (3mL) SubQ pen Inject into the skin.      lidocaine (XYLOCAINE) 5 % Oint ointment Apply topically as needed. 3 g 0    LINACLOTIDE (LINZESS ORAL) Take 72 mcg by mouth once daily.      LOPERAMIDE HCL (IMODIUM A-D ORAL) Take 1 tablet by mouth as needed.      metFORMIN (GLUCOPHAGE-XR) 500 MG 24 hr tablet       methotrexate 2.5 MG Tab       metoprolol succinate (TOPROL-XL) 100 MG 24 hr tablet Take 1 tablet (100 mg total) by mouth once daily. 90 tablet 3    NAPROXEN SODIUM (ALEVE ORAL) Take 1 tablet by mouth as needed.      pantoprazole (PROTONIX) 40 MG tablet Take 40 mg by mouth  "once daily.      pioglitazone-metformin (ACTOPLUS MET)  mg per tablet       TRADJENTA 5 mg Tab tablet       UMECLIDINIUM BRM/VILANTEROL TR (ANORO ELLIPTA INHL) Inhale into the lungs once daily.      VENTOLIN HFA 90 mcg/actuation inhaler       vitamin D 1000 units Tab Take 185 mg by mouth once daily.       No current facility-administered medications on file prior to visit.        BP (!) 111/53   Pulse 80   Ht 5' 3" (1.6 m)   Wt 58.1 kg (128 lb)   BMI 22.67 kg/m²       Objective:     Physical Exam   Constitutional: She is oriented to person, place, and time. She appears well-developed and well-nourished.  Non-toxic appearance. She does not appear ill. No distress.   HENT:   Head: Normocephalic and atraumatic.   Nose: Nose normal.   Mouth/Throat: No oropharyngeal exudate.   Eyes: Right eye exhibits no discharge. Left eye exhibits no discharge. Right conjunctiva is not injected. Left conjunctiva is not injected. Right pupil is round. Left pupil is round. Pupils are equal.   Neck: Neck supple. No JVD present. Carotid bruit is not present. No tracheal deviation present.   Cardiovascular: Normal rate, regular rhythm, S1 normal and S2 normal. PMI is not displaced. Exam reveals gallop and S4. Exam reveals no S3.   Murmur heard.   Midsystolic murmur is present with a grade of 2/6 at the upper right sternal border.  Pulses:       Radial pulses are 2+ on the right side, and 2+ on the left side.        Dorsalis pedis pulses are 1+ on the right side, and 1+ on the left side.        Posterior tibial pulses are 1+ on the right side, and 1+ on the left side.   Pulmonary/Chest: Effort normal and breath sounds normal.   Abdominal: Soft. Normal appearance. There is no hepatosplenomegaly. There is no tenderness.   Musculoskeletal:        Right ankle: She exhibits no swelling, no ecchymosis and no deformity.        Left ankle: She exhibits no swelling, no ecchymosis and no deformity.   Lymphadenopathy:        Head (right " side): No submandibular adenopathy present.        Head (left side): No submandibular adenopathy present.     She has no cervical adenopathy.   Neurological: She is alert and oriented to person, place, and time. She is not disoriented. No cranial nerve deficit or sensory deficit.   Skin: Skin is warm, dry and intact. No rash noted. She is not diaphoretic. Nails show no clubbing.   Psychiatric: She has a normal mood and affect. Her speech is normal and behavior is normal. Judgment and thought content normal. Cognition and memory are normal.       Assessment:      1. Heart failure, diastolic, chronic    2. Right bundle branch block    3. Shortness of breath    4. Essential hypertension    5. Hypercholesterolemia    6. Type 2 diabetes mellitus with stage 2 chronic kidney disease, without long-term current use of insulin    7. Chronic kidney disease, stage II (mild)    8. Rheumatoid arthritis involving both hands with positive rheumatoid factor        Plan:     1. Heart Failure, Diastolic, Chronic   1/10/2014: CXR: WNL.   2/13/2014: Echo: Normal LV size and function. Mild LVH. Moderate diastolic dysfunction. Elevated LVEDP. Mildly dilated LA.   2/13/2014: Stress MPI: 3:30 min. No CP. Low exercise tolerance. ECG negative. MPI negative.   Suspect exertional dyspnea related to diastolic dysfunction and high LVEDP as well as her pulmonary disease.   Significantly less short of breath since on furosemide 20 mg Q24.   As heart rate appeared to go up with exertion to a level where she felt palpitations increased dose of metoprolol to 100 mg Q24.   Well controlled.   Stable.    2. Right Bundle Branch Block   2018: Diagnosed with RBBB.    3. Shortness of Breath   8/2013: Began experience exertional dyspnea.   1/13/2017: PFTs: Small airway obstruction without significant response to inhalers.    On Anoro inhaler and Ventoline and was in pulmonary rehabilitation program.   Stable.   Dr. Jarek Wade.      4. Hypertension   1992:  Diagnosed.   On metoprolol 100 mg Q24, benazepril 20 mg Q24 and furosemide 20 mg Q24.   Keeping log at home.   Overall well controlled.     5. Hypercholesterolemia   1992: Diagnosed.   On atorvastatin 20 mg Q24.   Tells me well controlled.     6. Diabetes Mellitus, Type 2   1992: Diagnosed. Complications: CKD2. Medications: Oral agent.   On metformin and Trajenta.   On aspirin 81 mg Q24.   Well controlled.    7. Chronic Kidney Disease, Stage 2   3/3/2015: BUN/crea 15/1.0. CrCl 65.   Stable.    8. Rheumatoid Arthritis.   2006: Diagnosed. Involvement: Hands, knees and back.   On methotrexate and folic acid.   Dr. Ezequiel Figueroa Jr..     9. Primary Care   Dr. Mauricio Sood.    F/u 6 months.    Garrett Caldreon M.D.

## 2019-05-02 DIAGNOSIS — I50.32 HEART FAILURE, DIASTOLIC, CHRONIC: ICD-10-CM

## 2019-05-02 DIAGNOSIS — I10 ESSENTIAL HYPERTENSION: ICD-10-CM

## 2019-05-02 RX ORDER — FUROSEMIDE 20 MG/1
TABLET ORAL
Qty: 90 TABLET | Refills: 0 | Status: SHIPPED | OUTPATIENT
Start: 2019-05-02 | End: 2019-01-01 | Stop reason: SDUPTHER

## 2019-09-11 PROBLEM — I48.91 ATRIAL FIBRILLATION WITH RVR: Status: ACTIVE | Noted: 2019-01-01

## 2019-09-11 PROBLEM — R74.8 ABNORMAL LIVER ENZYMES: Status: ACTIVE | Noted: 2019-01-01

## 2019-09-11 NOTE — ASSESSMENT & PLAN NOTE
- Patient reports well controlled on Tradjenta and Basaglar 10 units daily and 15 units every 5th day.  - Low dose SSI for now.  Might start some long-acting tomorrow.  Check new HbA1c.

## 2019-09-11 NOTE — SUBJECTIVE & OBJECTIVE
Past Medical History:   Diagnosis Date    Chronic kidney disease, stage II (mild) 2/3/2014    Diabetes mellitus, type 2 1992    GERD (gastroesophageal reflux disease)     Heart failure, diastolic, chronic 3/3/2014    History of bronchitis     Hypercholesterolemia     Hypertension, malignant 1992    PONV (postoperative nausea and vomiting)     Rheumatoid arthritis 2006    Right bundle branch block 9/12/2018    2018: Diagnosed with RBBB.       Past Surgical History:   Procedure Laterality Date    APPENDECTOMY      CHOLECYSTECTOMY      CHOLECYSTECTOMY-LAPAROSCOPIC N/A 5/17/2017    Performed [facility] Burke [facility] at [facility] OR    HYSTERECTOMY  1976    OSTEOTOMY-AKIN / RIGHT #1 Right 12/18/2015    Performed [facility] Julio Cesar Talbert D[facility] at [facility] OR    REPAIR-HAMMER TOE Right 12/18/2015    Performed [facility] at [facility] OR    REPAIR-HAMMER TOE / LEFT #3,4,5 Left 12/18/2015    Performed [facility] at [facility] OR    TOE SURGERY  2015       Review of patient's allergies indicates:   Allergen Reactions    Codeine Nausea And Vomiting     States can take oxycodone and hydrocodone    Sulfa (sulfonamide antibiotics) Nausea And Vomiting       No current facility-administered medications on file prior to encounter.      Current Outpatient Medications on File Prior to Encounter   Medication Sig    aspirin (ECOTRIN) 81 MG EC tablet Take 1 tablet (81 mg total) by mouth once daily.    atorvastatin (LIPITOR) 20 MG tablet Take 1 tablet (20 mg total) by mouth once daily.    benazepril (LOTENSIN) 20 MG tablet Take 1 tablet (20 mg total) by mouth once daily.    DOCOSAHEXANOIC ACID/EPA (FISH OIL ORAL) Take 1,200 mg by mouth once daily.     folic acid (FOLVITE) 1 MG tablet TK 1 T PO QD    furosemide (LASIX) 20 MG tablet Take 1 tablet (20 mg total) by mouth once daily.    gabapentin (NEURONTIN) 300 MG capsule TK 1 C PO D HS    insulin (BASAGLAR KWIKPEN U-100 INSULIN) glargine 100 units/mL (3mL) SubQ pen  Inject 10 Units into the skin once daily. Take 15 units every 5th day.    methotrexate 2.5 MG Tab 10 mg every Monday.     metoprolol succinate (TOPROL-XL) 100 MG 24 hr tablet Take 1 tablet (100 mg total) by mouth once daily.    TRADJENTA 5 mg Tab tablet     UMECLIDINIUM BRM/VILANTEROL TR (ANORO ELLIPTA INHL) Inhale 1 puff into the lungs once daily.     VENTOLIN HFA 90 mcg/actuation inhaler Inhale 2 puffs into the lungs 2 (two) times daily as needed for Wheezing or Shortness of Breath.     vitamin D 1000 units Tab Take 1,000 Units by mouth once daily.     NAPROXEN SODIUM (ALEVE ORAL) Take 1 tablet by mouth as needed.    [DISCONTINUED] ANORO ELLIPTA 62.5-25 mcg/actuation DsDv     [DISCONTINUED] atorvastatin (LIPITOR) 20 MG tablet TAKE 1 TABLET(20 MG) BY MOUTH EVERY DAY    [DISCONTINUED] benazepril (LOTENSIN) 20 MG tablet TAKE 1 TABLET(20 MG) BY MOUTH EVERY DAY    [DISCONTINUED] furosemide (LASIX) 20 MG tablet TAKE 1 TABLET(20 MG) BY MOUTH EVERY DAY    [DISCONTINUED] lidocaine (XYLOCAINE) 5 % Oint ointment Apply topically as needed.    [DISCONTINUED] LINACLOTIDE (LINZESS ORAL) Take 72 mcg by mouth once daily.    [DISCONTINUED] LOPERAMIDE HCL (IMODIUM A-D ORAL) Take 1 tablet by mouth as needed.    [DISCONTINUED] metFORMIN (GLUCOPHAGE-XR) 500 MG 24 hr tablet     [DISCONTINUED] pantoprazole (PROTONIX) 40 MG tablet Take 40 mg by mouth once daily.     Family History     Problem Relation (Age of Onset)    Asthma Daughter    Diabetes type II Daughter    Heart attack Father    Hypertension Mother, Daughter    No Known Problems Son, Son    Stroke Mother        Tobacco Use    Smoking status: Never Smoker    Smokeless tobacco: Never Used   Substance and Sexual Activity    Alcohol use: No    Drug use: Never    Sexual activity: Not on file     Review of Systems   Constitutional: Negative for chills and fever.   HENT: Negative for rhinorrhea and sore throat.    Eyes: Negative for photophobia and visual  disturbance.   Respiratory: Positive for chest tightness and shortness of breath. Negative for cough and wheezing.    Cardiovascular: Positive for palpitations and leg swelling. Negative for chest pain.   Gastrointestinal: Negative for constipation, diarrhea, nausea and vomiting.   Endocrine: Negative for polydipsia and polyuria.   Genitourinary: Negative for dysuria and frequency.   Musculoskeletal: Positive for arthralgias, gait problem and myalgias. Negative for back pain.   Neurological: Negative for dizziness, weakness and headaches.   Psychiatric/Behavioral: Negative for confusion and dysphoric mood.     Objective:     Vital Signs (Most Recent):  Temp: 97.6 °F (36.4 °C) (09/11/19 1202)  Pulse: 110 (09/11/19 1623)  Resp: 20 (09/11/19 1623)  BP: 129/78 (09/11/19 1621)  SpO2: 100 % (09/11/19 1623) Vital Signs (24h Range):  Temp:  [97.6 °F (36.4 °C)] 97.6 °F (36.4 °C)  Pulse:  [110-126] 110  Resp:  [14-20] 20  SpO2:  [99 %-100 %] 100 %  BP: (118-129)/(76-88) 129/78     Weight: 60.3 kg (133 lb)  Body mass index is 23.56 kg/m².    Physical Exam   Constitutional: She is oriented to person, place, and time. She appears well-developed and well-nourished.   HENT:   Head: Normocephalic.   Eyes: Pupils are equal, round, and reactive to light. Conjunctivae are normal.   Neck: Neck supple. No thyromegaly present.   Cardiovascular: Intact distal pulses. Exam reveals no gallop and no friction rub.   Murmur heard.  Irregularly irregular rhythm;    Pulmonary/Chest: Effort normal and breath sounds normal. No respiratory distress. She has no wheezes. She has no rales.   Abdominal: Soft. Bowel sounds are normal. She exhibits no distension. There is no tenderness.   Musculoskeletal: Normal range of motion. She exhibits edema.   Trace lower extremity edema.   Lymphadenopathy:     She has no cervical adenopathy.   Neurological: She is alert and oriented to person, place, and time.   Strength equal and symmetric   Skin: Skin is  warm and dry. No rash noted.   Psychiatric: She has a normal mood and affect. Her behavior is normal. Thought content normal.         CRANIAL NERVES     CN III, IV, VI   Pupils are equal, round, and reactive to light.       Significant Labs: All pertinent labs within the past 24 hours have been reviewed.    Significant Imaging: I have reviewed all pertinent imaging results/findings within the past 24 hours.

## 2019-09-11 NOTE — PLAN OF CARE
09/11/19 1457   Discharge Assessment   Assessment Type Discharge Planning Assessment   Confirmed/corrected address and phone number on facesheet? Yes   Assessment information obtained from? Patient   Communicated expected length of stay with patient/caregiver yes   Prior to hospitilization cognitive status: Alert/Oriented   Prior to hospitalization functional status: Independent   Current cognitive status: Alert/Oriented   Current Functional Status: Independent   Able to Return to Prior Arrangements yes   Is patient able to care for self after discharge? Yes   Patient currently being followed by outpatient case management? No   Patient currently receives any other outside agency services? No   Equipment Currently Used at Home walker, rolling   Do you have any problems affording any of your prescribed medications? TBD   Is the patient taking medications as prescribed? yes   Does the patient have transportation home? Yes   Discharge Plan A Home with family   Discharge Plan B Home Health   DME Needed Upon Discharge  none   Patient/Family in Agreement with Plan yes    RNCM met with patient at the bedside.      Patient is alert and oriented with no communication barriers.      Prior to admission patient was independent with ADLs. 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       Patients PCP is correct on the face sheet. [facility] pharmacy is [facility]     Patient denies a history of mental illness.         Patients family will transport him home at discharge.         No CM needs identified at this time.       CM team will continue to follow.

## 2019-09-11 NOTE — ASSESSMENT & PLAN NOTE
- BNP elevated and CXR shows possible effusions.  She has lower extremity edema.  No crackles on exam however.  - Will diurese with IV Lasix once daily.  - Monitor renal function closely.  - Has elevated transaminases but normal alkaline phosphatase/albumin.  Might be hepatic congestion.

## 2019-09-11 NOTE — ASSESSMENT & PLAN NOTE
- CKD II diagnosed some years ago.  Today creatinine 1.6, closer to CKD III.  - She admits to occasionally taking Aleve and she is on lisinopril and diuretics.  Might have mild ATN.  Would expect this to resolve.  - Lisinopril held.  Monitor closely.

## 2019-09-11 NOTE — ASSESSMENT & PLAN NOTE
- Presented with atrial fibrillation associated with palpitations and shortness of breath  - Unclear how long she has had this as the symptoms have actually been present for well over a year without atrial fibrillation being diagnosed.  - Cardiology consulted - followed in office by Dr. Calderon.  Increasing her beta blocker to metoprolol  mg daily and adding apixaban.  - Patient also with chest tightness and has slightly elevated troponin.  Previous stress echo showed poor exercise tolerance but no WMA.  This was some time ago and might need repeating given her risk factors for CAD.

## 2019-09-11 NOTE — HPI
"Ms. Oseguera is a 79 year old woman who presented to her PCP's office for a routine visit today, was found to be in rapid atrial fibrillation and was sent to the hospital.  Patient reports she has had poor exercise tolerance for longer than a year, with shortness of breath and dyspnea with minimal activity.  This is associated with a tight feeling in her chest and discomfort in her thighs and ankles.  She has had swelling of her lower extremities that improves when she puts up her feet.  On arrival she was in atrial fibrillation with .  Labs were notable for mild anemia at baseline, BUN/creatinine 25/1.6, AST//117, troponin 0.093, .  CXR showed possible small effusions vs atelectasis.  She is being admitted for further workup and treatment.    Medical history includes HTN and chronic diastolic heart failure with most recent EF 76% and diastolic dysfunction in 2017.  There was also mild to moderate mitral regurgitation.  She has rheumatoid arthritis and is on MTX 10 mg every Monday.  She also takes Aleve once in awhile although says "I know I'm not supposed to."  She had a stress echo some time ago which was notable for poor exercise tolerance.  She has type II diabetes and is on Tradjenta and Basaglar insulin.  She is followed by Dr. Jarek Wade for small airways disease for which she is on Anoro Ellipta and a rescue inhaler.  She uses the inhaler frequently but says it doesn't relieve the shortness of breath.  She has been told she has stage II CKD.  She has never been a smoker.  "

## 2019-09-11 NOTE — H&P
"[facility] Hospital Medicine  History & Physical    Patient Name: Sandra Oseguera  MRN: [id number]  Admission Date: 9/11/2019  Attending Physician: Kathy Pina MD   Primary Care Provider: Mauricio Sood MD         Patient information was obtained from patient, relative(s), past medical records and ER records.     Subjective:     Principal Problem:Atrial fibrillation with RVR    Chief Complaint:   Chief Complaint   Patient presents with    Shortness of Breath     Pt reports SOB for the past several weeks. Sent from [facility]'s office for evaluation and was told she has an irregular heartbeat        HPI: Ms. Oseguera is a 79 year old woman who presented to her PCP's office for a routine visit today, was found to be in rapid atrial fibrillation and was sent to the hospital.  Patient reports she has had poor exercise tolerance for longer than a year, with shortness of breath and dyspnea with minimal activity.  This is associated with a tight feeling in her chest and discomfort in her thighs and ankles.  She has had swelling of her lower extremities that improves when she puts up her feet.  On arrival she was in atrial fibrillation with .  Labs were notable for mild anemia at baseline, BUN/creatinine 25/1.6, AST//117, troponin 0.093, .  CXR showed possible small effusions vs atelectasis.  She is being admitted for further workup and treatment.    Medical history includes HTN and chronic diastolic heart failure with most recent EF 76% and diastolic dysfunction in 2017.  There was also mild to moderate mitral regurgitation.  She has rheumatoid arthritis and is on MTX 10 mg every Monday.  She also takes Aleve once in awhile although says "I know I'm not supposed to."  She had a stress echo some time ago which was notable for poor exercise tolerance.  She has type II diabetes and is on Tradjenta and Basaglar insulin.  She is followed by Dr. Jarek Wade for small airways " disease for which she is on Anoro Ellipta and a rescue inhaler.  She uses the inhaler frequently but says it doesn't relieve the shortness of breath.  She has been told she has stage II CKD.  She has never been a smoker.    Past Medical History:   Diagnosis Date    Chronic kidney disease, stage II (mild) 2/3/2014    Diabetes mellitus, type 2 1992    GERD (gastroesophageal reflux disease)     Heart failure, diastolic, chronic 3/3/2014    History of bronchitis     Hypercholesterolemia     Hypertension, malignant 1992    PONV (postoperative nausea and vomiting)     Rheumatoid arthritis 2006    Right bundle branch block 9/12/2018    2018: Diagnosed with RBBB.       Past Surgical History:   Procedure Laterality Date    APPENDECTOMY      CHOLECYSTECTOMY      CHOLECYSTECTOMY-LAPAROSCOPIC N/A 5/17/2017    Performed by [facility]    HYSTERECTOMY  1976    OSTEOTOMY-AKIN / RIGHT #1 Right 12/18/2015    [facility]bert [facility]    REPAIR-HAMMER TOE Right 12/18/2015    [facility]    REPAIR-HAMMER TOE / LEFT #3,4,5 Left 12/18/2015    Performed [facility]    TOE SURGERY  2015       Review of patient's allergies indicates:   Allergen Reactions    Codeine Nausea And Vomiting     States can take oxycodone and hydrocodone    Sulfa (sulfonamide antibiotics) Nausea And Vomiting       No current facility-administered medications on file prior to encounter.      Current Outpatient Medications on File Prior to Encounter   Medication Sig    aspirin (ECOTRIN) 81 MG EC tablet Take 1 tablet (81 mg total) by mouth once daily.    atorvastatin (LIPITOR) 20 MG tablet Take 1 tablet (20 mg total) by mouth once daily.    benazepril (LOTENSIN) 20 MG tablet Take 1 tablet (20 mg total) by mouth once daily.    DOCOSAHEXANOIC ACID/EPA (FISH OIL ORAL) Take 1,200 mg by mouth once daily.     folic acid (FOLVITE) 1 MG tablet TK 1 T PO QD     furosemide (LASIX) 20 MG tablet Take 1 tablet (20 mg total) by mouth once daily.    gabapentin (NEURONTIN) 300 MG capsule TK 1 C PO D HS    insulin (BASAGLAR KWIKPEN U-100 INSULIN) glargine 100 units/mL (3mL) SubQ pen Inject 10 Units into the skin once daily. Take 15 units every 5th day.    methotrexate 2.5 MG Tab 10 mg every Monday.     metoprolol succinate (TOPROL-XL) 100 MG 24 hr tablet Take 1 tablet (100 mg total) by mouth once daily.    TRADJENTA 5 mg Tab tablet     UMECLIDINIUM BRM/VILANTEROL TR (ANORO ELLIPTA INHL) Inhale 1 puff into the lungs once daily.     VENTOLIN HFA 90 mcg/actuation inhaler Inhale 2 puffs into the lungs 2 (two) times daily as needed for Wheezing or Shortness of Breath.     vitamin D 1000 units Tab Take 1,000 Units by mouth once daily.     NAPROXEN SODIUM (ALEVE ORAL) Take 1 tablet by mouth as needed.    [DISCONTINUED] ANORO ELLIPTA 62.5-25 mcg/actuation DsDv     [DISCONTINUED] atorvastatin (LIPITOR) 20 MG tablet TAKE 1 TABLET(20 MG) BY MOUTH EVERY DAY    [DISCONTINUED] benazepril (LOTENSIN) 20 MG tablet TAKE 1 TABLET(20 MG) BY MOUTH EVERY DAY    [DISCONTINUED] furosemide (LASIX) 20 MG tablet TAKE 1 TABLET(20 MG) BY MOUTH EVERY DAY    [DISCONTINUED] lidocaine (XYLOCAINE) 5 % Oint ointment Apply topically as needed.    [DISCONTINUED] LINACLOTIDE (LINZESS ORAL) Take 72 mcg by mouth once daily.    [DISCONTINUED] LOPERAMIDE HCL (IMODIUM A-D ORAL) Take 1 tablet by mouth as needed.    [DISCONTINUED] metFORMIN (GLUCOPHAGE-XR) 500 MG 24 hr tablet     [DISCONTINUED] pantoprazole (PROTONIX) 40 MG tablet Take 40 mg by mouth once daily.     Family History     Problem Relation (Age of Onset)    Asthma Daughter    Diabetes type II Daughter    Heart attack Father    Hypertension Mother, Daughter    No Known Problems Son, Son    Stroke Mother        Tobacco Use    Smoking status: Never Smoker    Smokeless tobacco: Never Used   Substance and Sexual Activity    Alcohol use: No     Drug use: Never    Sexual activity: Not on file     Review of Systems   Constitutional: Negative for chills and fever.   HENT: Negative for rhinorrhea and sore throat.    Eyes: Negative for photophobia and visual disturbance.   Respiratory: Positive for chest tightness and shortness of breath. Negative for cough and wheezing.    Cardiovascular: Positive for palpitations and leg swelling. Negative for chest pain.   Gastrointestinal: Negative for constipation, diarrhea, nausea and vomiting.   Endocrine: Negative for polydipsia and polyuria.   Genitourinary: Negative for dysuria and frequency.   Musculoskeletal: Positive for arthralgias, gait problem and myalgias. Negative for back pain.   Neurological: Negative for dizziness, weakness and headaches.   Psychiatric/Behavioral: Negative for confusion and dysphoric mood.     Objective:     Vital Signs (Most Recent):  Temp: 97.6 °F (36.4 °C) (09/11/19 1202)  Pulse: 110 (09/11/19 1623)  Resp: 20 (09/11/19 1623)  BP: 129/78 (09/11/19 1621)  SpO2: 100 % (09/11/19 1623) Vital Signs (24h Range):  Temp:  [97.6 °F (36.4 °C)] 97.6 °F (36.4 °C)  Pulse:  [110-126] 110  Resp:  [14-20] 20  SpO2:  [99 %-100 %] 100 %  BP: (118-129)/(76-88) 129/78     Weight: 60.3 kg (133 lb)  Body mass index is 23.56 kg/m².    Physical Exam   Constitutional: She is oriented to person, place, and time. She appears well-developed and well-nourished.   HENT:   Head: Normocephalic.   Eyes: Pupils are equal, round, and reactive to light. Conjunctivae are normal.   Neck: Neck supple. No thyromegaly present.   Cardiovascular: Intact distal pulses. Exam reveals no gallop and no friction rub.   Murmur heard.  Irregularly irregular rhythm;    Pulmonary/Chest: Effort normal and breath sounds normal. No respiratory distress. She has no wheezes. She has no rales.   Abdominal: Soft. Bowel sounds are normal. She exhibits no distension. There is no tenderness.   Musculoskeletal: Normal range of motion. She  "exhibits edema.   Trace lower extremity edema.   Lymphadenopathy:     She has no cervical adenopathy.   Neurological: She is alert and oriented to person, place, and time.   Strength equal and symmetric   Skin: Skin is warm and dry. No rash noted.   Psychiatric: She has a normal mood and affect. Her behavior is normal. Thought content normal.         CRANIAL NERVES     CN III, IV, VI   Pupils are equal, round, and reactive to light.       Significant Labs: All pertinent labs within the past 24 hours have been reviewed.    Significant Imaging: I have reviewed all pertinent imaging results/findings within the past 24 hours.    Assessment/Plan:     * Atrial fibrillation with RVR  - Presented with atrial fibrillation associated with palpitations and shortness of breath  - Unclear how long she has had this as the symptoms have actually been present for well over a year without atrial fibrillation being diagnosed.  - Cardiology consulted - followed in office by Dr. Calderon.  Increasing her beta blocker to metoprolol  mg daily and adding apixaban.  - Patient also with chest tightness and has slightly elevated troponin.  Previous stress echo showed poor exercise tolerance but no WMA.  This was some time ago and might need repeating given her risk factors for CAD.        Abnormal liver enzymes  - As above under "heart failure"  - Will hold Lipitor for now.  - Check lipid panel in AM      Rheumatoid arthritis  - Patient on weekly MTX.  Not likely to require this while she is here.  - Monitor      Heart failure, diastolic, acute on chronic  - BNP elevated and CXR shows possible effusions.  She has lower extremity edema.  No crackles on exam however.  - Will diurese with IV Lasix once daily.  - Monitor renal function closely.  - Has elevated transaminases but normal alkaline phosphatase/albumin.  Might be hepatic congestion.      Chronic kidney disease, stage II (mild)  - CKD II diagnosed some years ago.  Today " creatinine 1.6, closer to CKD III.  - She admits to occasionally taking Aleve and she is on lisinopril and diuretics.  Might have mild ATN.  Would expect this to resolve.  - Lisinopril held.  Monitor closely.      Type 2 diabetes mellitus with stage 2 chronic kidney disease, with long-term current use of insulin  - Patient reports well controlled on Tradjenta and Basaglar 10 units daily and 15 units every 5th day.  - Low dose SSI for now.  Might start some long-acting tomorrow.  Check new HbA1c.      VTE Risk Mitigation (From admission, onward)        Ordered     apixaban tablet 5 mg  2 times daily      09/11/19 1644     Place sequential compression device  Until discontinued      09/11/19 1644     IP VTE HIGH RISK PATIENT  Once      09/11/19 1644     Reason for No Pharmacological VTE Prophylaxis  Once      09/11/19 1644             Kathy Chow MD  Department of Hospital Medicine   [facility]

## 2019-09-11 NOTE — ASSESSMENT & PLAN NOTE
"- As above under "heart failure"  - Will hold Lipitor for now.  - Check lipid panel in AM (ordered yesterday but not done - will re-order)    "

## 2019-09-11 NOTE — ED TRIAGE NOTES
"Pt of ER with c/o SOB on and off x "months." Pt reports SOB worse with ambulation. Pt noted with HR of 120s-140s. Bilateral ankles swollen. Pt denies history of irregular heart beat. Pt sitting in bed in no distress with no current c/o SOB or feeling heart beating fast.   "

## 2019-09-11 NOTE — ED PROVIDER NOTES
Encounter Date: 9/11/2019    SCRIBE #1 NOTE: I, Malena Ferrari, am scribing for, and in the presence of, Dr. Hayes .       History     Chief Complaint   Patient presents with    Shortness of Breath     Pt reports SOB for the past several weeks. Sent from [place]'s office for evaluation and was told she has an irregular heartbeat     Time seen by provider: 12:18 PM    This is a 79 y.o. female who presents with complaint of sob and intermittent palpitations since months ago. The patient has a tightening of the chest that feels like a rubber band is being tightened around her chest that occur in 10 min intervals and is exacerbated when walking and bending over. She saw Dr. Sood today for a checkup and she mentioned some of her recent symptoms to her,  was noted to be in AFib RVR, and sent to the ER for further evaluation.  Her sob is exacerbated when moving around and she reports that it has worsened since 6 months ago. She cannot walk from her bedroom to the bathroom without feeling sob. She has also had some n/v since 2 days ago. The patient reports swelling in her legs which is also new.     The history is provided by the patient.     Review of patient's allergies indicates:   Allergen Reactions    Codeine Nausea And Vomiting     States can take oxycodone and hydrocodone    Sulfa (sulfonamide antibiotics) Nausea And Vomiting     Past Medical History:   Diagnosis Date    Chronic kidney disease, stage II (mild) 2/3/2014    Diabetes mellitus, type 2 1992    GERD (gastroesophageal reflux disease)     Heart failure, diastolic, chronic 3/3/2014    History of bronchitis     Hypercholesterolemia     Hypertension, malignant 1992    PONV (postoperative nausea and vomiting)     Rheumatoid arthritis 2006    Right bundle branch block 9/12/2018    2018: Diagnosed with RBBB.     Past Surgical History:   Procedure Laterality Date    APPENDECTOMY      CHOLECYSTECTOMY      CHOLECYSTECTOMY-LAPAROSCOPIC N/A 5/17/2017     [facility]    HYSTERECTOMY  1976    OSTEOTOMY-AKIN / RIGHT #1 Right 12/18/2015    [facility]    REPAIR-HAMMER TOE Right 12/18/2015    [facility]    REPAIR-HAMMER TOE / LEFT #3,4,5 Left 12/18/2015    [facility]    TOE SURGERY  2015     Family History   Problem Relation Age of Onset    Hypertension Mother     Stroke Mother     Heart attack Father     Diabetes type II Daughter     Hypertension Daughter     Asthma Daughter     No Known Problems Son     No Known Problems Son      Social History     Tobacco Use    Smoking status: Never Smoker    Smokeless tobacco: Never Used   Substance Use Topics    Alcohol use: No    Drug use: Never     Review of Systems   Constitutional: Negative for chills and fever.   HENT: Negative for congestion, rhinorrhea and sore throat.    Eyes: Negative for visual disturbance.   Respiratory: Positive for chest tightness and shortness of breath. Negative for cough.    Cardiovascular: Positive for palpitations and leg swelling. Negative for chest pain.   Gastrointestinal: Positive for nausea and vomiting. Negative for abdominal pain and diarrhea.   Genitourinary: Negative for dysuria.   Musculoskeletal: Negative for back pain.        Positive for leg swelling.    Skin: Negative for rash.   Neurological: Negative for dizziness, weakness and light-headedness.   Psychiatric/Behavioral: Negative for confusion.       Physical Exam     Initial Vitals [09/11/19 1202]   BP Pulse Resp Temp SpO2   125/88 (!) 126 18 97.6 °F (36.4 °C) 100 %      MAP       --         Physical Exam    Nursing note and vitals reviewed.  Constitutional: She appears well-developed and well-nourished. She is not diaphoretic. No distress.   HENT:   Head: Normocephalic and atraumatic.   Right Ear: External ear normal.   Left Ear: External ear normal.   Eyes: Conjunctivae are normal.  Right eye exhibits no discharge. Left eye exhibits no discharge.   Neck: Normal range of motion. Neck supple.   Cardiovascular: Regular rhythm and normal heart sounds. Exam reveals no gallop.    Irregularly irregular heartbeat.  Tachycardic.   Pulmonary/Chest: Breath sounds normal. No respiratory distress. She has no wheezes. She has no rhonchi. She has no rales.   Abdominal: Soft. Bowel sounds are normal. She exhibits no distension. There is no tenderness. There is no rebound and no guarding.   Musculoskeletal: Normal range of motion. She exhibits no edema or tenderness.   Lymphadenopathy:     She has no cervical adenopathy.   Neurological: She is alert and oriented to person, place, and time. She has normal strength. No sensory deficit.   Skin: Skin is warm and dry. No rash noted. No erythema.   Pitting edema to lower extremity.    Psychiatric: She has a normal mood and affect. Her behavior is normal.         ED Course   Critical Care  Date/Time: 9/11/2019 3:15 PM  Performed by: Lou Hayes MD  Authorized by: Lou Hayes MD   Direct patient critical care time: 10 minutes  Additional history critical care time: 5 minutes  Ordering / reviewing critical care time: 5 minutes  Documentation critical care time: 5 minutes  Consulting other physicians critical care time: 5 minutes  Total critical care time (exclusive of procedural time) : 30 minutes  Critical care time was exclusive of separately billable procedures and treating other patients and teaching time.  Critical care was necessary to treat or prevent imminent or life-threatening deterioration of the following conditions: circulatory failure and cardiac failure.  Critical care was time spent personally by me on the following activities: discussions with consultants, obtaining history from patient or surrogate, ordering and review of radiographic studies, review of old charts, re-evaluation of patient's condition, ordering and review of laboratory studies,  examination of patient, development of treatment plan with patient or surrogate, evaluation of patient's response to treatment, pulse oximetry and ordering and performing treatments and interventions.        Labs Reviewed   COMPREHENSIVE METABOLIC PANEL - Abnormal; Notable for the following components:       Result Value    Glucose 218 (*)     BUN, Bld 25 (*)     Creatinine 1.6 (*)      (*)      (*)     eGFR if  35 (*)     eGFR if non  31 (*)     All other components within normal limits   CBC W/ AUTO DIFFERENTIAL - Abnormal; Notable for the following components:    RBC 3.60 (*)     Hemoglobin 11.3 (*)     Hematocrit 34.0 (*)     Mean Corpuscular Hemoglobin 31.4 (*)     RDW 17.0 (*)     MPV 14.1 (*)     Immature Granulocytes 1.0 (*)     Immature Grans (Abs) 0.09 (*)     Gran% 77.2 (*)     Lymph% 11.5 (*)     All other components within normal limits   B-TYPE NATRIURETIC PEPTIDE - Abnormal; Notable for the following components:     (*)     All other components within normal limits   TROPONIN I - Abnormal; Notable for the following components:    Troponin I 0.093 (*)     All other components within normal limits   MAGNESIUM   PROTIME-INR   APTT   TSH   T4, FREE     EKG Readings: (Independently Interpreted)   12:41PM:  Rate of 112.  Irregularly irregular rhythm.  Tachycardic.  Widened QRS at 126 milliseconds.  Right bundle branch block.  No other ST or ischemic changes.       Imaging Results          X-Ray Chest AP Portable (Final result)  Result time 09/11/19 13:11:12    Final result by Lula Lou MD (09/11/19 13:11:12)                 Impression:      As above      Electronically signed by: Lula Lou MD  Date:    09/11/2019  Time:    13:11             Narrative:    EXAMINATION:  XR CHEST AP PORTABLE    CLINICAL HISTORY:  Shortness of breath    TECHNIQUE:  Single frontal view of the chest was performed.    COMPARISON:  May 17, 2017    FINDINGS:  Heart  size is unremarkable.  No increase in pulmonary vascularity.  Osseous structures are intact.    Image quality degraded by portable body habitus and overlying soft tissue.  Allowing for this, the costophrenic angles bilaterally are somewhat poorly defined, may relate to a small volume of pleural fluid and lung base atelectasis.                              X-Rays:   Independently Interpreted Readings:   Chest X-Ray: Trachea midline.  No cardiomegaly.  Infiltrate, edema, possible bilateral effusions     Medical Decision Making:   History:   Old Medical Records: I decided to obtain old medical records.  Old Records Summarized: other records and records from clinic visits.  Initial Assessment:   12:18PM:  Patient is a 71-year-old female who presents to the emergency department with progressive dyspnea on exertion along with chest tightness and noted to be in AFib RVR.  Patient has no history of AFib.  She does have a history of cardiomyopathy.  She does have AFib RVR at this time but her pressure is stable.  Will plan for labs, imaging, will continue to follow and reassess.  Independently Interpreted Test(s):   I have ordered and independently interpreted X-rays - see prior notes.  I have ordered and independently interpreted EKG Reading(s) - see prior notes  Clinical Tests:   Lab Tests: Ordered and Reviewed  Radiological Study: Ordered and Reviewed  Medical Tests: Ordered and Reviewed  Other:   I have discussed this case with another health care provider.    2:45PM:  Patient doing well, remains stable. She does remain in AFib RVR though her heart rate has remained under the 120s.  She does have an elevation in her creatinine along with an elevation of her troponin.  Given her labs and her new onset AFib, will plan to admit for further observation and management.  Patient agreeable to plan all questions answered.    2:55 PM:  I discussed the patient with Dr. Calderon.  Would recommend starting apixaban 5 mg b.i.d.  along with doubling her beta-blocker dose.  Would recommend admission to hospitalist and he will consult.    2:59 PM:  I discussed the patient with Dr. Garland.  Will plan to admit for inpatient admission under Dr. Pina.              Scribe Attestation:   Scribe #1: I performed the above scribed service and the documentation accurately describes the services I performed. I attest to the accuracy of the note.    Attending Attestation:           Physician Attestation for Scribe:  Physician Attestation Statement for Scribe #1: I, Dr. aHyes, reviewed documentation, as scribed by Malena Ferrari  in my presence, and it is both accurate and complete.                    Clinical Impression:     1. Atrial fibrillation with RVR    2. Tachycardia    3. Shortness of breath    4. Chest pain    5. Atrial fibrillation    6. Atrial fibrillation                                   Lou Hayes MD  09/11/19 4882

## 2019-09-12 NOTE — CONSULTS
[facility]  Cardiology  Consult Note    Patient Name: Sandra Oseguera  MRN: [id number]  Admission Date: 9/11/2019  Hospital Length of Stay: 0 days  Code Status: Full Code   Attending Provider: Kathy Pina MD   Consulting Provider: Garrett Calderon MD  Primary Care Physician: Mauricio Sood MD  Principal Problem:Atrial fibrillation with RVR    Patient information was obtained from patient, past medical records and ER records.     Inpatient consult to Cardiology  Consult performed by: Garrett Calderon MD  Consult ordered by: Kathy Pina MD  Reason for consult: Atrial fibrillation        Subjective:     Chief Complaint:  Palpitations.     HPI:     Sandra Oseguera is a 79 y.o.female with hypertension, hypercholesterolemia and diabetes mellitus type 2. She has rheumatoid arthritis involving knees, hands and back. In the summer of 2013 she developed progressive exertional dyspnea occasionally associated with mild chest pressure. She had an Echocardiogram that revealed findings consistent with hypertensive heart disease with a high LVEDP. She had low exercise tolerance on a Stress MPI but no defects. She was began on furosemide and after that she was breathing better. She had no exertional chest pain but mild to moderate exertional dyspnea if walking fast but no wheezing. Her heart rate was 70-80 bpm at rest and no longer raced with activities. As it appeared her exertional dyspnea was out of proportion to her cardiac findings she was referrred for PFTs that were done on 1/13/2017. They revealed small airway obstruction without significant response to inhalers. She saw Dr. Jarek Wade and was prescribed Anoro inhalers and a rescue inhaler. She also got enrolled in pulmonary rehabilitation and did 25 sessions. She was breathing better and thought her legs holds her back more than the breathing.     In late 8/2019 she noted palpitations and then slowly got more and more short of  breath. She was seen by Dr. Mauricio Sood on 9/11/2019 and noted to be in atrial fibrillation with moderately fast VRR. She was admitted.    Past Medical History:   Diagnosis Date    Chronic kidney disease, stage II (mild) 2/3/2014    Diabetes mellitus, type 2 1992    GERD (gastroesophageal reflux disease)     Heart failure, diastolic, chronic 3/3/2014    History of bronchitis     Hypercholesterolemia     Hypertension, malignant 1992    PONV (postoperative nausea and vomiting)     Rheumatoid arthritis 2006    Right bundle branch block 9/12/2018    2018: Diagnosed with RBBB.       Past Surgical History:   Procedure Laterality Date    APPENDECTOMY      CHOLECYSTECTOMY      CHOLECYSTECTOMY-LAPAROSCOPIC N/A 5/17/2017    Performed by Burke Perla MD at [facility] OR    HYSTERECTOMY  1976    OSTEOTOMY-AKIN / RIGHT #1 Right 12/18/2015    Performed by Julio Cesar Talbert DPM at [facility] OR    REPAIR-HAMMER TOE Right 12/18/2015    Performed by Julio Cesar Talbert D[facility] at [facility] OR    REPAIR-HAMMER TOE / LEFT #3,4,5 Left 12/18/2015    Performed by Julio Cesar Talbert DPM at [facility] OR    TOE SURGERY  2015       Review of patient's allergies indicates:   Allergen Reactions    Codeine Nausea And Vomiting     States can take oxycodone and hydrocodone    Sulfa (sulfonamide antibiotics) Nausea And Vomiting       No current facility-administered medications on file prior to encounter.      Current Outpatient Medications on File Prior to Encounter   Medication Sig    aspirin (ECOTRIN) 81 MG EC tablet Take 1 tablet (81 mg total) by mouth once daily.    atorvastatin (LIPITOR) 20 MG tablet Take 1 tablet (20 mg total) by mouth once daily.    benazepril (LOTENSIN) 20 MG tablet Take 1 tablet (20 mg total) by mouth once daily.    DOCOSAHEXANOIC ACID/EPA (FISH OIL ORAL) Take 1,200 mg by mouth once daily.     folic acid (FOLVITE) 1 MG tablet TK 1 T PO QD    furosemide (LASIX) 20 MG tablet Take 1 tablet (20 mg total) by mouth once daily.     gabapentin (NEURONTIN) 300 MG capsule TK 1 C PO D HS    insulin (BASAGLAR KWIKPEN U-100 INSULIN) glargine 100 units/mL (3mL) SubQ pen Inject 10 Units into the skin once daily. Take 15 units every 5th day.    methotrexate 2.5 MG Tab 10 mg every Monday.     metoprolol succinate (TOPROL-XL) 100 MG 24 hr tablet Take 1 tablet (100 mg total) by mouth once daily.    TRADJENTA 5 mg Tab tablet     UMECLIDINIUM BRM/VILANTEROL TR (ANORO ELLIPTA INHL) Inhale 1 puff into the lungs once daily.     VENTOLIN HFA 90 mcg/actuation inhaler Inhale 2 puffs into the lungs 2 (two) times daily as needed for Wheezing or Shortness of Breath.     vitamin D 1000 units Tab Take 1,000 Units by mouth once daily.     NAPROXEN SODIUM (ALEVE ORAL) Take 1 tablet by mouth as needed.    [DISCONTINUED] ANORO ELLIPTA 62.5-25 mcg/actuation DsDv     [DISCONTINUED] atorvastatin (LIPITOR) 20 MG tablet TAKE 1 TABLET(20 MG) BY MOUTH EVERY DAY    [DISCONTINUED] benazepril (LOTENSIN) 20 MG tablet TAKE 1 TABLET(20 MG) BY MOUTH EVERY DAY    [DISCONTINUED] furosemide (LASIX) 20 MG tablet TAKE 1 TABLET(20 MG) BY MOUTH EVERY DAY    [DISCONTINUED] lidocaine (XYLOCAINE) 5 % Oint ointment Apply topically as needed.    [DISCONTINUED] LINACLOTIDE (LINZESS ORAL) Take 72 mcg by mouth once daily.    [DISCONTINUED] LOPERAMIDE HCL (IMODIUM A-D ORAL) Take 1 tablet by mouth as needed.    [DISCONTINUED] metFORMIN (GLUCOPHAGE-XR) 500 MG 24 hr tablet     [DISCONTINUED] pantoprazole (PROTONIX) 40 MG tablet Take 40 mg by mouth once daily.     Family History     Problem Relation (Age of Onset)    Asthma Daughter    Diabetes type II Daughter    Heart attack Father    Hypertension Mother, Daughter    No Known Problems Son, Son    Stroke Mother        Tobacco Use    Smoking status: Never Smoker    Smokeless tobacco: Never Used   Substance and Sexual Activity    Alcohol use: No    Drug use: Never    Sexual activity: Not on file     Review of Systems    Constitution: Negative for chills, fever and malaise/fatigue.   HENT: Negative for nosebleeds.    Eyes: Negative for double vision, vision loss in left eye and vision loss in right eye.   Cardiovascular: Positive for dyspnea on exertion, leg swelling and palpitations. Negative for chest pain, claudication, irregular heartbeat, near-syncope, orthopnea, paroxysmal nocturnal dyspnea and syncope.   Respiratory: Positive for shortness of breath. Negative for cough, hemoptysis and wheezing.    Endocrine: Negative for cold intolerance and heat intolerance.   Hematologic/Lymphatic: Negative for bleeding problem. Does not bruise/bleed easily.   Skin: Negative for color change and rash.   Musculoskeletal: Negative for back pain, falls, muscle weakness and myalgias.   Gastrointestinal: Negative for heartburn, hematemesis, hematochezia, hemorrhoids, jaundice, melena, nausea and vomiting.   Genitourinary: Negative for dysuria and hematuria.   Neurological: Negative for dizziness, focal weakness, headaches, light-headedness, loss of balance, numbness, vertigo and weakness.   Psychiatric/Behavioral: Negative for altered mental status, depression and memory loss. The patient is not nervous/anxious.    Allergic/Immunologic: Negative for hives and persistent infections.     Objective:     Vital Signs (Most Recent):  Temp: 98.2 °F (36.8 °C) (09/11/19 1710)  Pulse: (!) 114 (09/11/19 1800)  Resp: 18 (09/11/19 1710)  BP: 119/83 (09/11/19 1710)  SpO2: 99 % (09/11/19 1710) Vital Signs (24h Range):  Temp:  [97.6 °F (36.4 °C)-98.2 °F (36.8 °C)] 98.2 °F (36.8 °C)  Pulse:  [110-126] 114  Resp:  [14-20] 18  SpO2:  [99 %-100 %] 99 %  BP: (118-129)/(76-88) 119/83     Weight: 61.4 kg (135 lb 5.8 oz)  Body mass index is 23.98 kg/m².    SpO2: 99 %       No intake or output data in the 24 hours ending 09/11/19 1920    Lines/Drains/Airways     Peripheral Intravenous Line                 Peripheral IV - Single Lumen 09/11/19 1221 18 G Left  Antecubital less than 1 day                Physical Exam   Constitutional: She is oriented to person, place, and time. She appears well-developed and well-nourished.  Non-toxic appearance. No distress.   HENT:   Head: Normocephalic and atraumatic.   Nose: Nose normal.   Eyes: Right eye exhibits no discharge. Left eye exhibits no discharge. Right conjunctiva is not injected. Left conjunctiva is not injected. Right pupil is round. Left pupil is round. Pupils are equal.   Neck: Neck supple. No JVD present. Carotid bruit is not present. No thyromegaly present.   Cardiovascular: Normal rate, S1 normal and S2 normal. An irregularly irregular rhythm present.  No extrasystoles are present. PMI is not displaced.   Pulses:       Radial pulses are 2+ on the right side, and 2+ on the left side.        Femoral pulses are 2+ on the right side, and 2+ on the left side.       Dorsalis pedis pulses are 2+ on the right side, and 2+ on the left side.        Posterior tibial pulses are 2+ on the right side, and 2+ on the left side.   Pulmonary/Chest: Effort normal and breath sounds normal.   Abdominal: Soft. Normal appearance. There is no hepatosplenomegaly. There is no tenderness.   Musculoskeletal:        Right ankle: She exhibits swelling. She exhibits no ecchymosis and no deformity.        Left ankle: She exhibits swelling. She exhibits no ecchymosis and no deformity.   Lymphadenopathy:        Head (right side): No submandibular adenopathy present.        Head (left side): No submandibular adenopathy present.     She has no cervical adenopathy.   Neurological: She is alert and oriented to person, place, and time. She is not disoriented. No cranial nerve deficit.   Skin: Skin is warm, dry and intact. No rash noted. She is not diaphoretic. No cyanosis. Nails show no clubbing.   Psychiatric: She has a normal mood and affect. Her speech is normal and behavior is normal. Judgment and thought content normal. Cognition and memory are  normal.       Current Medications:     apixaban  5 mg Oral BID    [START ON 9/12/2019] furosemide  40 mg Intravenous Daily    [START ON 9/12/2019] metoprolol succinate  200 mg Oral Daily     Current Laboratory Results:    Recent Results (from the past 24 hour(s))   Comprehensive metabolic panel    Collection Time: 09/11/19 12:25 PM   Result Value Ref Range    Sodium 139 136 - 145 mmol/L    Potassium 4.3 3.5 - 5.1 mmol/L    Chloride 102 95 - 110 mmol/L    CO2 25 23 - 29 mmol/L    Glucose 218 (H) 70 - 110 mg/dL    BUN, Bld 25 (H) 8 - 23 mg/dL    Creatinine 1.6 (H) 0.5 - 1.4 mg/dL    Calcium 9.8 8.7 - 10.5 mg/dL    Total Protein 7.5 6.0 - 8.4 g/dL    Albumin 3.8 3.5 - 5.2 g/dL    Total Bilirubin 0.9 0.1 - 1.0 mg/dL    Alkaline Phosphatase 123 55 - 135 U/L     (H) 10 - 40 U/L     (H) 10 - 44 U/L    Anion Gap 12 8 - 16 mmol/L    eGFR if African American 35 (A) >60 mL/min/1.73 m^2    eGFR if non African American 31 (A) >60 mL/min/1.73 m^2   CBC auto differential    Collection Time: 09/11/19 12:25 PM   Result Value Ref Range    WBC 9.16 3.90 - 12.70 K/uL    RBC 3.60 (L) 4.00 - 5.40 M/uL    Hemoglobin 11.3 (L) 12.0 - 16.0 g/dL    Hematocrit 34.0 (L) 37.0 - 48.5 %    Mean Corpuscular Volume 94 82 - 98 fL    Mean Corpuscular Hemoglobin 31.4 (H) 27.0 - 31.0 pg    Mean Corpuscular Hemoglobin Conc 33.2 32.0 - 36.0 g/dL    RDW 17.0 (H) 11.5 - 14.5 %    Platelets 162 150 - 350 K/uL    MPV 14.1 (H) 9.2 - 12.9 fL    Immature Granulocytes 1.0 (H) 0.0 - 0.5 %    Gran # (ANC) 7.1 1.8 - 7.7 K/uL    Immature Grans (Abs) 0.09 (H) 0.00 - 0.04 K/uL    Lymph # 1.1 1.0 - 4.8 K/uL    Mono # 0.8 0.3 - 1.0 K/uL    Eos # 0.1 0.0 - 0.5 K/uL    Baso # 0.04 0.00 - 0.20 K/uL    nRBC 0 0 /100 WBC    Gran% 77.2 (H) 38.0 - 73.0 %    Lymph% 11.5 (L) 18.0 - 48.0 %    Mono% 8.8 4.0 - 15.0 %    Eosinophil% 1.1 0.0 - 8.0 %    Basophil% 0.4 0.0 - 1.9 %    Differential Method Automated    Brain natriuretic peptide    Collection Time: 09/11/19  12:25 PM   Result Value Ref Range     (H) 0 - 99 pg/mL   Troponin I    Collection Time: 09/11/19 12:25 PM   Result Value Ref Range    Troponin I 0.093 (H) 0.000 - 0.026 ng/mL   Magnesium    Collection Time: 09/11/19 12:25 PM   Result Value Ref Range    Magnesium 1.6 1.6 - 2.6 mg/dL   Protime-INR    Collection Time: 09/11/19 12:25 PM   Result Value Ref Range    Prothrombin Time 11.9 9.0 - 12.5 sec    INR 1.1 0.8 - 1.2   APTT    Collection Time: 09/11/19 12:25 PM   Result Value Ref Range    aPTT 25.7 21.0 - 32.0 sec   T4, free    Collection Time: 09/11/19 12:25 PM   Result Value Ref Range    Free T4 1.06 0.71 - 1.51 ng/dL   TSH    Collection Time: 09/11/19 12:25 PM   Result Value Ref Range    TSH 2.221 0.400 - 4.000 uIU/mL   Troponin I    Collection Time: 09/11/19  5:34 PM   Result Value Ref Range    Troponin I 0.095 (H) 0.000 - 0.026 ng/mL     Current Imaging Results:    X-Ray Chest AP Portable   Final Result      As above         Electronically signed by: Lula Lou MD   Date:    09/11/2019   Time:    13:11          Assessment and Plan:     Active Diagnoses:    Diagnosis Date Noted POA    PRINCIPAL PROBLEM:  Atrial fibrillation with RVR [I48.91] 09/11/2019 Yes    Heart failure, diastolic, acute on chronic [I50.33] 03/03/2014 Yes    Type 2 diabetes mellitus with stage 2 chronic kidney disease, with long-term current use of insulin [E11.22, N18.2, Z79.4] 02/03/2014 Not Applicable    Chronic kidney disease, stage II (mild) [N18.2] 02/03/2014 Yes    Rheumatoid arthritis [M06.9] 12/01/2014 Yes    Abnormal liver enzymes [R74.8] 09/11/2019 Yes      Problems Resolved During this Admission:     Assessment and Plan:    1. Heart Failure, Diastolic, Acute on Chronic              1/10/2014: CXR: WNL.              2/13/2014: Echo: Normal LV size and function. Mild LVH. Moderate diastolic dysfunction. Elevated LVEDP. Mildly dilated LA.              2/13/2014: Stress MPI: 3:30 min. No CP. Low exercise tolerance.  ECG negative. MPI negative.              Suspect exertional dyspnea related to diastolic dysfunction and high LVEDP as well as her pulmonary disease.              Significantly less short of breath since on furosemide 20 mg Q24.              As heart rate appeared to go up with exertion to a level where she felt palpitations increased dose of metoprolol to 100 mg Q24.              9/11/2019: Presents in HF probably due to atrial fibrillation.              Diuresis.    2. Atrial Fibrillation   8/2019: Suspect onset of persistent atrial fibrillation with moderately fast VRR.   [id number] ([id number]).   On metoprolol 100 mg - increased to 200 mg Q24.   Anticoagulation with apixiban 5 mg Q12.   9/12/2019: Plan CRISTELA guided CV.    3. Chronic Anticoagulation   8/2019: Suspect onset of persistent atrial fibrillation with moderately fast VRR.   [id number] ([id number]).   Anticoagulation with apixiban 5 mg Q12.     4. Right Bundle Branch Block              2018: Diagnosed with RBBB.     5. Shortness of Breath              8/2013: Began experience exertional dyspnea.              1/13/2017: PFTs: Small airway obstruction without significant response to inhalers.              On Anoro inhaler and Ventoline and was in pulmonary rehabilitation program.              Stable.              Dr. Jaerk Wade.                            6. Hypertension              1992: Diagnosed.              At home been on metoprolol 100 mg Q24, benazepril 20 mg Q24 and furosemide 20 mg Q24.              Keeping log at home.              Overall well controlled.                7. Hypercholesterolemia              1992: Diagnosed.              On atorvastatin 20 mg Q24.              Tells me well controlled.                8. Diabetes Mellitus, Type 2              1992: Diagnosed. Complications: CKD2. Medications: Oral agent.              On metformin and Trajenta.              On aspirin 81 mg Q24.              Well controlled.     9. Chronic  Kidney Disease, Stage 2              3/3/2015: BUN/crea 15/1.0. CrCl 65.              Stable.     10. Rheumatoid Arthritis.              2006: Diagnosed. Involvement: Hands, knees and back.              On methotrexate and folic acid.              Dr. Ezequiel Figueroa Jr..                11. Primary Care              Dr. Mauricio Sood.    The planned procedure was discussed in detail with the patient and the family members present. Risk, benefit and alternatives were reviewed. All questions answered. Consent was then signed.    If further questions or concerns arise the patient was encouraged to contact me prior to the planned procedure.        VTE Risk Mitigation (From admission, onward)        Ordered     apixaban tablet 5 mg  2 times daily      09/11/19 1644     Place sequential compression device  Until discontinued      09/11/19 1644     IP VTE HIGH RISK PATIENT  Once      09/11/19 1644     Reason for No Pharmacological VTE Prophylaxis  Once      09/11/19 1644          Thank you for your consult.    I will follow-up with patient. Please contact us if you have any additional questions.    Garrett Calderon MD  Cardiology   [facility]

## 2019-09-12 NOTE — PROGRESS NOTES
Received call from Dr. Calderon notifying me that the patient would be scheduled for her cardioversion tomorrow, and she could be put back on her normal diet today following her transesophageal echocardiogram. Will continue to monitor patient.

## 2019-09-12 NOTE — HOSPITAL COURSE
Patient was admitted and was seen by cardiology.  She was continued on double the dose of her beta blocker and was started on apixaban.  She had  cardioversion on 9/13, and  she converted to sinus rhythm.She did feel better with shortness of breath.Had some nose bleeding which stopped with pressure , was advised to keep nares moist and avoid picking and if had bleeding to apply pressure and given afrin spray prescription.

## 2019-09-12 NOTE — SUBJECTIVE & OBJECTIVE
Interval History:  She is very short of breath just walking to the bathroom.  Feels OK when just sitting.  No palpitations even with HR up to 140 but she does get more short of breath. 2D echo done today.    Review of Systems   Constitutional: Negative for chills and fever.   Respiratory: Positive for chest tightness and shortness of breath. Negative for cough.    Cardiovascular: Positive for leg swelling. Negative for chest pain and palpitations.     Objective:     Vital Signs (Most Recent):  Temp: 97.5 °F (36.4 °C) (09/12/19 1757)  Pulse: (!) 118 (09/12/19 1757)  Resp: 18 (09/12/19 1757)  BP: 111/84 (09/12/19 1757)  SpO2: 100 % (09/12/19 1757) Vital Signs (24h Range):  Temp:  [97.1 °F (36.2 °C)-98.7 °F (37.1 °C)] 97.5 °F (36.4 °C)  Pulse:  [112-140] 118  Resp:  [16-20] 18  SpO2:  [95 %-100 %] 100 %  BP: (111-133)/(71-90) 111/84     Weight: 61.4 kg (135 lb 5.8 oz)  Body mass index is 23.98 kg/m².    Intake/Output Summary (Last 24 hours) at 9/12/2019 1838  Last data filed at 9/12/2019 1700  Gross per 24 hour   Intake --   Output 1100 ml   Net -1100 ml      Physical Exam   Constitutional: She is oriented to person, place, and time. She appears well-developed and well-nourished.   HENT:   Head: Normocephalic.   Eyes: Pupils are equal, round, and reactive to light. Conjunctivae are normal.   Neck: Neck supple. No thyromegaly present.   Cardiovascular: Intact distal pulses. Exam reveals no gallop and no friction rub.   Murmur heard.  Irregularly irregular rhythm;    Pulmonary/Chest: Effort normal and breath sounds normal. No respiratory distress. She has no wheezes. She has no rales.   Abdominal: Soft. Bowel sounds are normal. She exhibits no distension. There is no tenderness.   Musculoskeletal: Normal range of motion. She exhibits edema.   Trace lower extremity edema.   Lymphadenopathy:     She has no cervical adenopathy.   Neurological: She is alert and oriented to person, place, and time.   Strength equal and  symmetric   Skin: Skin is warm and dry. No rash noted.   Psychiatric: She has a normal mood and affect. Her behavior is normal. Thought content normal.       Significant Labs: All pertinent labs within the past 24 hours have been reviewed.    Significant Imaging: I have reviewed all pertinent imaging results/findings within the past 24 hours.

## 2019-09-12 NOTE — ASSESSMENT & PLAN NOTE
- Presented with atrial fibrillation associated with palpitations and shortness of breath  - Unclear how long she has had this as the symptoms have actually been present for well over a year without atrial fibrillation being diagnosed.  - Cardiology consulted - followed in office by Dr. Calderon.  Increasing her beta blocker to metoprolol  mg daily and adding apixaban.  - Patient also with chest tightness and has slightly elevated troponin.  Previous stress echo showed poor exercise tolerance but no WMA.    - Repeat 2D echo showed severe TR and pulmonary HTN.  EF was depressed at 45% with patient in atrial fibrillation.

## 2019-09-12 NOTE — ASSESSMENT & PLAN NOTE
- BNP elevated and CXR shows possible effusions.  She has lower extremity edema.    - In addition has severe TR with pulmonary HTN.  - Will diurese with IV Lasix once daily.  - Monitor renal function closely.  - Has elevated transaminases but normal alkaline phosphatase/albumin.  Might be hepatic congestion.

## 2019-09-12 NOTE — PROGRESS NOTES
"[facility] Hospital Medicine  Progress Note    Patient Name: Sandra Oseguera  MRN: [id number]  Patient Class: IP- Inpatient   Admission Date: 9/11/2019  Length of Stay: 1 days  Attending Physician: Kathy Pina MD  Primary Care Provider: Mauricio Sood MD        Subjective:     Principal Problem:Atrial fibrillation with RVR        HPI:  Ms. Oseguera is a 79 year old woman who presented to her PCP's office for a routine visit today, was found to be in rapid atrial fibrillation and was sent to the hospital.  Patient reports she has had poor exercise tolerance for longer than a year, with shortness of breath and dyspnea with minimal activity.  This is associated with a tight feeling in her chest and discomfort in her thighs and ankles.  She has had swelling of her lower extremities that improves when she puts up her feet.  On arrival she was in atrial fibrillation with .  Labs were notable for mild anemia at baseline, BUN/creatinine 25/1.6, AST//117, troponin 0.093, .  CXR showed possible small effusions vs atelectasis.  She is being admitted for further workup and treatment.    Medical history includes HTN and chronic diastolic heart failure with most recent EF 76% and diastolic dysfunction in 2017.  There was also mild to moderate mitral regurgitation.  She has rheumatoid arthritis and is on MTX 10 mg every Monday.  She also takes Aleve once in awhile although says "I know I'm not supposed to."  She had a stress echo some time ago which was notable for poor exercise tolerance.  She has type II diabetes and is on Tradjenta and Basaglar insulin.  She is followed by Dr. Jarek Wade for small airways disease for which she is on Anoro Ellipta and a rescue inhaler.  She uses the inhaler frequently but says it doesn't relieve the shortness of breath.  She has been told she has stage II CKD.  She has never been a smoker.    Overview/Hospital Course:  Patient was admitted and " was seen by cardiology.  She was continued on double the dose of her beta blocker and was started on apixaban.  Plan was made for cardioversion on 9/13.    Interval History:  She is very short of breath just walking to the bathroom.  Feels OK when just sitting.  No palpitations even with HR up to 140 but she does get more short of breath. 2D echo done today.    Review of Systems   Constitutional: Negative for chills and fever.   Respiratory: Positive for chest tightness and shortness of breath. Negative for cough.    Cardiovascular: Positive for leg swelling. Negative for chest pain and palpitations.     Objective:     Vital Signs (Most Recent):  Temp: 97.5 °F (36.4 °C) (09/12/19 1757)  Pulse: (!) 118 (09/12/19 1757)  Resp: 18 (09/12/19 1757)  BP: 111/84 (09/12/19 1757)  SpO2: 100 % (09/12/19 1757) Vital Signs (24h Range):  Temp:  [97.1 °F (36.2 °C)-98.7 °F (37.1 °C)] 97.5 °F (36.4 °C)  Pulse:  [112-140] 118  Resp:  [16-20] 18  SpO2:  [95 %-100 %] 100 %  BP: (111-133)/(71-90) 111/84     Weight: 61.4 kg (135 lb 5.8 oz)  Body mass index is 23.98 kg/m².    Intake/Output Summary (Last 24 hours) at 9/12/2019 1838  Last data filed at 9/12/2019 1700  Gross per 24 hour   Intake --   Output 1100 ml   Net -1100 ml      Physical Exam   Constitutional: She is oriented to person, place, and time. She appears well-developed and well-nourished.   HENT:   Head: Normocephalic.   Eyes: Pupils are equal, round, and reactive to light. Conjunctivae are normal.   Neck: Neck supple. No thyromegaly present.   Cardiovascular: Intact distal pulses. Exam reveals no gallop and no friction rub.   Murmur heard.  Irregularly irregular rhythm;    Pulmonary/Chest: Effort normal and breath sounds normal. No respiratory distress. She has no wheezes. She has no rales.   Abdominal: Soft. Bowel sounds are normal. She exhibits no distension. There is no tenderness.   Musculoskeletal: Normal range of motion. She exhibits edema.   Trace lower  "extremity edema.   Lymphadenopathy:     She has no cervical adenopathy.   Neurological: She is alert and oriented to person, place, and time.   Strength equal and symmetric   Skin: Skin is warm and dry. No rash noted.   Psychiatric: She has a normal mood and affect. Her behavior is normal. Thought content normal.       Significant Labs: All pertinent labs within the past 24 hours have been reviewed.    Significant Imaging: I have reviewed all pertinent imaging results/findings within the past 24 hours.      Assessment/Plan:      * Atrial fibrillation with RVR  - Presented with atrial fibrillation associated with palpitations and shortness of breath  - Unclear how long she has had this as the symptoms have actually been present for well over a year without atrial fibrillation being diagnosed.  - Cardiology consulted - followed in office by Dr. Calderon.  Increasing her beta blocker to metoprolol  mg daily and adding apixaban.  - Patient also with chest tightness and has slightly elevated troponin.  Previous stress echo showed poor exercise tolerance but no WMA.    - Repeat 2D echo showed severe TR and pulmonary HTN.  EF was depressed at 45% with patient in atrial fibrillation.    Abnormal liver enzymes  - As above under "heart failure"  - Will hold Lipitor for now.  - Check lipid panel in AM (ordered yesterday but not done - will re-order)      Rheumatoid arthritis  - Patient on weekly MTX (Mondays).  Not likely to require this while she is here.  - Monitor      Heart failure, diastolic, acute on chronic  - BNP elevated and CXR shows possible effusions.  She has lower extremity edema.    - In addition has severe TR with pulmonary HTN.  - Will diurese with IV Lasix once daily.  - Monitor renal function closely.  - Has elevated transaminases but normal alkaline phosphatase/albumin.  Might be hepatic congestion.      Chronic kidney disease, stage II (mild)  - CKD II diagnosed some years ago.  Creatinine now " 1.4.  - She admits to occasionally taking Aleve and she is on lisinopril and diuretics.  Might have mild ATN.  Would expect this to resolve.  - Lisinopril held.  She has had some improvement since admission.  Monitor closely.      Type 2 diabetes mellitus with stage 2 chronic kidney disease, with long-term current use of insulin  - Patient reports well controlled on Tradjenta and Basaglar 10 units daily and 15 units every 5th day.  - Low dose SSI for now.  Start long-acting insulin.  Check new HbA1c.        VTE Risk Mitigation (From admission, onward)        Ordered     apixaban tablet 5 mg  2 times daily      09/11/19 1934     Place sequential compression device  Until discontinued      09/11/19 1644     IP VTE HIGH RISK PATIENT  Once      09/11/19 1644     Reason for No Pharmacological VTE Prophylaxis  Once      09/11/19 1644                Kathy Chow MD  Department of Hospital Medicine   [facility]

## 2019-09-12 NOTE — PLAN OF CARE
"Problem: Adult Inpatient Plan of Care  Goal: Plan of Care Review  Outcome: Ongoing (interventions implemented as appropriate)  Plan of care discussed with patient by Dr. Calderon first then by me during patient's assessment.  A Tranesophageal echocardiagram followed by a Cardioversion was explained to patient and family.  No new questions asked.    Problem: Diabetes Comorbidity  Goal: Blood Glucose Level Within Desired Range  Patient's HS CBG was 237.  Patient is NPO and declines anything even as early as 2200hrs..so no sliding scale given at this time.  Will monitor ro s/s of hypo/hyperglycemia.    Problem: Fall Injury Risk  Goal: Absence of Fall and Fall-Related Injury  Outcome: Ongoing (interventions implemented as appropriate)  Patient is a fall risk on paper but is quite ambulatory with a steady even gait.  She is no longer "winded" at this time.  Yellow bracelet and socks applied.  No falls/injuries observed/voiced.      "

## 2019-09-12 NOTE — ASSESSMENT & PLAN NOTE
- Patient reports well controlled on Tradjenta and Basaglar 10 units daily and 15 units every 5th day.  - Low dose SSI for now.  Start long-acting insulin.  Check new HbA1c.

## 2019-09-12 NOTE — ASSESSMENT & PLAN NOTE
- CKD II diagnosed some years ago.  Creatinine now 1.4.  - She admits to occasionally taking Aleve and she is on lisinopril and diuretics.  Might have mild ATN.  Would expect this to resolve.  - Lisinopril held.  She has had some improvement since admission.  Monitor closely.

## 2019-09-13 PROBLEM — Z79.01 CHRONIC ANTICOAGULATION: Status: ACTIVE | Noted: 2019-01-01

## 2019-09-13 NOTE — PLAN OF CARE
Problem: Adult Inpatient Plan of Care  Goal: Plan of Care Review  Outcome: Ongoing (interventions implemented as appropriate)  POC reviewed with patient. VS stable on room air. No complaints of pain throughout shift. IV flushed and saline locked. Patient on telemetry monitor which revealed atrial fibrillation; the patient also ran tachycardic most of the day. Patient had a transesophageal echocardiogram done today with Dr. Calderon; scheduled to have cardioversion tomorrow as well. No difficulty voiding; up to toilet. Positions self independently. Instructed to call for help ambulating. No injuries, falls, or trauma occurred during shift. Purposeful rounding completed. Bed low and locked with side rails up x 2 and call light within reach. Will continue to monitor.

## 2019-09-13 NOTE — PROGRESS NOTES
Received call from MAKENNA Rahman in cath lab. Reported that they are ready to do cardioversion. Denies any questions regarding patient. Aware patient is coming down with cardiac monitor. Dental appliance taken out and personal items left in room. Transport at bedside.

## 2019-09-13 NOTE — PROGRESS NOTES
[facility]  Cardiology  Progress Note    Patient Name: Sandra Oseguera  MRN: [id number]  Admission Date: 9/11/2019  Hospital Length of Stay: 1 days  Code Status: Full Code   Attending Physician: Kathy Pina MD   Primary Care Physician: Mauricio Sood MD  Expected Discharge Date:   Principal Problem:Atrial fibrillation with RVR    Subjective:     Brief HPI:    Sandra Oseguera is a 79 y.o.female with hypertension, hypercholesterolemia and diabetes mellitus type 2. She has rheumatoid arthritis involving knees, hands and back. In the summer of 2013 she developed progressive exertional dyspnea occasionally associated with mild chest pressure. She had an Echocardiogram that revealed findings consistent with hypertensive heart disease with a high LVEDP. She had low exercise tolerance on a Stress MPI but no defects. She was began on furosemide and after that she was breathing better. She had no exertional chest pain but mild to moderate exertional dyspnea if walking fast but no wheezing. Her heart rate was 70-80 bpm at rest and no longer raced with activities. As it appeared her exertional dyspnea was out of proportion to her cardiac findings she was referrred for PFTs that were done on 1/13/2017. They revealed small airway obstruction without significant response to inhalers. She saw Dr. Jarek Wade and was prescribed Anoro inhalers and a rescue inhaler. She also got enrolled in pulmonary rehabilitation and did 25 sessions. She was breathing better and thought her legs holds her back more than the breathing.      In late 8/2019 she noted palpitations and then slowly got more and more short of breath. She was seen by Dr. Mauricio Sood on 9/11/2019 and noted to be in atrial fibrillation with moderately fast VRR. She was admitted.    Hospital Course:     Diuresis.    Anticoagulation.    Rate control with metoprolol.    9/12/2019: Echo: Normal left ventricular size with mild systolic  dysfunction. EF 45%. Moderate LVH. Moderately dilated LA. Moderate MR. Severe TR. SPAP 67 mmHg.     Interval History:     Breathing easier. No CP. VRR 90 -110 bpm.    Review of Systems   Constitution: Negative for chills, fever and malaise/fatigue.   HENT: Negative for nosebleeds.    Eyes: Negative for vision loss in left eye and vision loss in right eye.   Cardiovascular: Positive for dyspnea on exertion and palpitations. Negative for chest pain, leg swelling, orthopnea and paroxysmal nocturnal dyspnea.   Respiratory: Positive for shortness of breath. Negative for cough, hemoptysis, sputum production and wheezing.    Hematologic/Lymphatic: Negative for bleeding problem.   Skin: Negative for rash.   Musculoskeletal: Negative for myalgias.   Gastrointestinal: Negative for abdominal pain, heartburn, hematemesis, hematochezia, jaundice, melena, nausea and vomiting.   Genitourinary: Negative for hematuria.   Neurological: Negative for dizziness, headaches, light-headedness, vertigo and weakness.   Psychiatric/Behavioral: Negative for altered mental status. The patient is not nervous/anxious.    Allergic/Immunologic: Negative for persistent infections.     Objective:     Vital Signs (Most Recent):  Temp: 97.5 °F (36.4 °C) (09/12/19 1757)  Pulse: 100 (09/12/19 1800)  Resp: 18 (09/12/19 1757)  BP: 111/84 (09/12/19 1757)  SpO2: 100 % (09/12/19 1757) Vital Signs (24h Range):  Temp:  [97.1 °F (36.2 °C)-98.7 °F (37.1 °C)] 97.5 °F (36.4 °C)  Pulse:  [100-140] 100  Resp:  [16-20] 18  SpO2:  [95 %-100 %] 100 %  BP: (111-133)/(71-90) 111/84     Weight: 61.4 kg (135 lb 5.8 oz)  Body mass index is 23.98 kg/m².    SpO2: 100 %  O2 Device (Oxygen Therapy): room air      Intake/Output Summary (Last 24 hours) at 9/12/2019 1911  Last data filed at 9/12/2019 1700  Gross per 24 hour   Intake --   Output 1100 ml   Net -1100 ml       Lines/Drains/Airways     Peripheral Intravenous Line                 Peripheral IV - Single Lumen 09/11/19  1221 18 G Left Antecubital 1 day                Physical Exam   Constitutional: She is oriented to person, place, and time. She appears well-developed and well-nourished.  Non-toxic appearance. She does not appear ill. No distress.   Eyes: Right conjunctiva is not injected. Right conjunctiva has no hemorrhage. Left conjunctiva is not injected. Left conjunctiva has no hemorrhage.   Neck: No JVD present.   Cardiovascular: S1 normal and S2 normal. An irregularly irregular rhythm present. Tachycardia present. Exam reveals no gallop.   Murmur heard.  High-pitched blowing holosystolic murmur is present with a grade of 2/6 at the apex.  Pulmonary/Chest: Effort normal and breath sounds normal.   Abdominal: Normal appearance. There is no tenderness.   Musculoskeletal:        Right ankle: She exhibits no swelling.        Left ankle: She exhibits no swelling.   Neurological: She is alert and oriented to person, place, and time. She is not disoriented.   Skin: Skin is warm and dry. No rash noted.   Psychiatric: She has a normal mood and affect. Her speech is normal and behavior is normal. Cognition and memory are normal.       Current Medications:     apixaban  5 mg Oral BID    furosemide  40 mg Intravenous Daily    insulin detemir U-100  10 Units Subcutaneous QHS    metoprolol succinate  200 mg Oral Daily     Current Laboratory Results:    Recent Results (from the past 24 hour(s))   POCT glucose    Collection Time: 09/11/19  9:44 PM   Result Value Ref Range    POCT Glucose 237 (H) 70 - 110 mg/dL   Troponin I    Collection Time: 09/12/19 12:12 AM   Result Value Ref Range    Troponin I 0.082 (H) 0.000 - 0.026 ng/mL   Magnesium    Collection Time: 09/12/19  4:52 AM   Result Value Ref Range    Magnesium 1.6 1.6 - 2.6 mg/dL   Phosphorus    Collection Time: 09/12/19  4:52 AM   Result Value Ref Range    Phosphorus 2.9 2.7 - 4.5 mg/dL   Comprehensive metabolic panel    Collection Time: 09/12/19  4:52 AM   Result Value Ref Range     Sodium 138 136 - 145 mmol/L    Potassium 4.3 3.5 - 5.1 mmol/L    Chloride 102 95 - 110 mmol/L    CO2 25 23 - 29 mmol/L    Glucose 213 (H) 70 - 110 mg/dL    BUN, Bld 26 (H) 8 - 23 mg/dL    Creatinine 1.4 0.5 - 1.4 mg/dL    Calcium 9.9 8.7 - 10.5 mg/dL    Total Protein 7.1 6.0 - 8.4 g/dL    Albumin 3.6 3.5 - 5.2 g/dL    Total Bilirubin 0.9 0.1 - 1.0 mg/dL    Alkaline Phosphatase 112 55 - 135 U/L    AST 62 (H) 10 - 40 U/L    ALT 93 (H) 10 - 44 U/L    Anion Gap 11 8 - 16 mmol/L    eGFR if African American 42 (A) >60 mL/min/1.73 m^2    eGFR if non African American 36 (A) >60 mL/min/1.73 m^2   POCT glucose    Collection Time: 09/12/19  5:50 AM   Result Value Ref Range    POCT Glucose 208 (H) 70 - 110 mg/dL   Echo Color Flow Doppler? Yes    Collection Time: 09/12/19  8:54 AM   Result Value Ref Range    BSA 1.64 m2    MV valve area p 1/2 method 5.37 cm2    MV stenosis pressure 1/2 time 41 ms    TDI SEPTAL 0.03 m/s    LV LATERAL E/E' RATIO 55.67 m/s    LV SEPTAL E/E' RATIO 55.67 m/s    LA WIDTH 4.22 cm    TDI LATERAL 0.03 m/s    PV PEAK VELOCITY 0.63 cm/s    LVIDD 3.46 (A) 3.5 - 6.0 cm    IVS 1.32 (A) 0.6 - 1.1 cm    PW 1.31 (A) 0.6 - 1.1 cm    LVIDS 2.57 2.1 - 4.0 cm    FS 26 28 - 44 %    LA volume 56.18 cm3    Sinus 2.68 cm    STJ 2.37 cm    Ascending aorta 2.40 cm    LV mass 154.06 g    LA size 3.18 cm    RVDD 3.39 cm    TAPSE 0.85 cm    Left Ventricle Relative Wall Thickness 0.76 cm    AV mean gradient 2 mmHg    AV valve area 3.03 cm2    AV Velocity Ratio 0.95     AV index (prosthetic) 1.01     Mean e' 0.03 m/s    E wave decelartion time 141.47 msec    LVOT diameter 1.96 cm    LVOT area 3.0 cm2    LVOT peak williams 0.84 m/s    LVOT peak VTI 13.93 cm    Ao peak williams 0.88 m/s    Ao VTI 13.85 cm    LVOT stroke volume 42.01 cm3    AV peak gradient 3 mmHg    E/E' ratio 55.67 m/s    MV Peak E Williams 1.67 m/s    TR Max Williams 3.83 m/s    LV Systolic Volume 23.99 mL    LV Systolic Volume Index 14.6 mL/m2    LV Diastolic Volume 49.38 mL     LV Diastolic Volume Index 30.14 mL/m2    LA Volume Index 34.3 mL/m2    LV Mass Index 94 g/m2    RA Major Axis 4.47 cm    Left Atrium Minor Axis 4.94 cm    Left Atrium Major Axis 4.91 cm    Triscuspid Valve Regurgitation Peak Gradient 59 mmHg    RA Width 3.72 cm    Right Atrial Pressure (from IVC) 8 mmHg    TV rest pulmonary artery pressure 67 mmHg   POCT glucose    Collection Time: 09/12/19 12:24 PM   Result Value Ref Range    POCT Glucose 215 (H) 70 - 110 mg/dL   POCT glucose    Collection Time: 09/12/19  6:01 PM   Result Value Ref Range    POCT Glucose 234 (H) 70 - 110 mg/dL     9/12/2019: Echo:    Moderate concentric left ventricular hypertrophy.  Mildly decreased left ventricular systolic function. The estimated ejection fraction is 45%  Mild global hypokinetic wall motion.  Atrial fibrillation observed.  Normal right ventricular systolic function.  Moderate left atrial enlargement.  Moderate right atrial enlargement.  Moderate mitral regurgitation.  Severe tricuspid regurgitation.  Moderate pulmonary hypertension present.  Intermediate central venous pressure (8 mm Hg).  The estimated PA systolic pressure is 67 mm Hg    Current Imaging Results:    X-Ray Chest AP Portable   Final Result      As above         Electronically signed by: Lula Lou MD   Date:    09/11/2019   Time:    13:11          Assessment and Plan:     Problem List:    Active Diagnoses:    Diagnosis Date Noted POA    PRINCIPAL PROBLEM:  Atrial fibrillation with RVR [I48.91] 09/11/2019 Yes    Heart failure, diastolic, acute on chronic [I50.33] 03/03/2014 Yes    Type 2 diabetes mellitus with stage 2 chronic kidney disease, with long-term current use of insulin [E11.22, N18.2, Z79.4] 02/03/2014 Not Applicable    Chronic kidney disease, stage II (mild) [N18.2] 02/03/2014 Yes    Rheumatoid arthritis [M06.9] 12/01/2014 Yes    Abnormal liver enzymes [R74.8] 09/11/2019 Yes      Problems Resolved During this Admission:     Assessment and  Plan:     1. Heart Failure, Diastolic, Acute on Chronic              1/10/2014: CXR: WNL.              2/13/2014: Echo: Normal LV size and function. Mild LVH. Moderate diastolic dysfunction. Elevated LVEDP. Mildly dilated LA.              2/13/2014: Stress MPI: 3:30 min. No CP. Low exercise tolerance. ECG negative. MPI negative.              Suspect exertional dyspnea related to diastolic dysfunction and high LVEDP as well as her pulmonary disease.              Significantly less short of breath since on furosemide 20 mg Q24.              As heart rate appeared to go up with exertion to a level where she felt palpitations increased dose of metoprolol to 100 mg Q24.              9/11/2019: Presents in HF probably due to atrial fibrillation.   9/12/2019: Echo: Normal left ventricular size with mild systolic dysfunction. EF 45%. Moderate LVH. Moderately dilated LA. Moderate MR. Severe TR. SPAP 67 mmHg.               Diuresis.     2. Atrial Fibrillation              8/2019: Suspect onset of persistent atrial fibrillation with moderately fast VRR.              [id number] ([id number]).              On metoprolol 100 mg - increased to 200 mg Q24.              Anticoagulation with apixiban 5 mg Q12.              9/13/2019: Plan CRISTELA guided CV.     3. Chronic Anticoagulation              8/2019: Suspect onset of persistent atrial fibrillation with moderately fast VRR.              [id number] ([id number]).              Anticoagulation with apixiban 5 mg Q12.     4. Right Bundle Branch Block              2018: Diagnosed with RBBB.     5. Shortness of Breath              8/2013: Began experience exertional dyspnea.              1/13/2017: PFTs: Small airway obstruction without significant response to inhalers.              On Anoro inhaler and Ventoline and was in pulmonary rehabilitation program.              Stable.              Dr. Jarek Wade.     6. Hypertension              1992: Diagnosed.              At home been on  metoprolol 100 mg Q24, benazepril 20 mg Q24 and furosemide 20 mg Q24.              Keeping log at home.              Overall well controlled.     7. Hypercholesterolemia              1992: Diagnosed.              On atorvastatin 20 mg Q24.              Tells me well controlled.     8. Diabetes Mellitus, Type 2              1992: Diagnosed. Complications: CKD2. Medications: Oral agent.              On metformin and Trajenta.              On aspirin 81 mg Q24.              Well controlled.     9. Chronic Kidney Disease, Stage 2              3/3/2015: BUN/crea 15/1.0. CrCl 65.              Stable.     10. Rheumatoid Arthritis.              2006: Diagnosed. Involvement: Hands, knees and back.              On methotrexate and folic acid.              Dr. Ezequiel Figueroa Jr..     11. Primary Care              Dr. Mauricio Sood.    The planned procedure was discussed in detail with the patient and the family members present. Risk, benefit and alternatives were reviewed. All questions answered. Consent was then signed.    If further questions or concerns arise the patient was encouraged to contact me prior to the planned procedure.         VTE Risk Mitigation (From admission, onward)        Ordered     apixaban tablet 5 mg  2 times daily      09/11/19 1934     Place sequential compression device  Until discontinued      09/11/19 1644     IP VTE HIGH RISK PATIENT  Once      09/11/19 1644     Reason for No Pharmacological VTE Prophylaxis  Once      09/11/19 1644          Garrett Calderon MD  Cardiology  [facility]

## 2019-09-13 NOTE — PLAN OF CARE
Problem: Adult Inpatient Plan of Care  Goal: Plan of Care Review  Outcome: Ongoing (interventions implemented as appropriate)  Patient on 3 LPM NC sat's 100% pt tachycardic prn not given .

## 2019-09-13 NOTE — PROGRESS NOTES
Gave report to Lacey Best RN in cath lab. Denies any questions regarding patient. Cardiac monitor left on patient, HR at 125, respiratory rate 18. Transport at bedside to take patient to procedure.

## 2019-09-13 NOTE — NURSING TRANSFER
Nursing Transfer Note      9/13/2019     Transfer To: cath lab    Transfer via stretcher    Transfer with cardiac monitoring    Transported by [facility]    Medicines sent: none    Chart send with patient: Yes    Notified: aster Landeros tech

## 2019-09-13 NOTE — PLAN OF CARE
Problem: Adult Inpatient Plan of Care  Goal: Plan of Care Review  Outcome: Ongoing (interventions implemented as appropriate)  Patient on RA. Sats 97%. No prn tx needed. Pt. in no distress, will continue to monitor.

## 2019-09-13 NOTE — PROGRESS NOTES
Received call from MAKENNA Rahman in cath lab. Sending patient back to floor and delaying procedure due to an emergency. Patient to remain NPO except for medications. Cardiac monitor sent with patient currently on patient.

## 2019-09-13 NOTE — ANESTHESIA PREPROCEDURE EVALUATION
09/13/2019  Sandra Oseguera is a 79 y.o., female.    Anesthesia Evaluation    I have reviewed the Patient Summary Reports.    I have reviewed the Nursing Notes.   I have reviewed the Medications.     Review of Systems  Anesthesia Hx:  Patient denies any anesthetic complication or PONV Denies Family Hx of Anesthesia complications.   Denies Personal Hx of Anesthesia complications.   Social:  Non-Smoker, No Alcohol Use    Hematology/Oncology:         -- Anemia:   Cardiovascular:   Hypertension Dysrhythmias atrial fibrillation    Pulmonary:   Shortness of breath    Renal/:   Chronic Renal Disease, CRI    Hepatic/GI:   GERD    Musculoskeletal:   Arthritis     Neurological:  Neurology Normal    Endocrine:   Diabetes, type 1, using insulin        Physical Exam  General:  Well nourished    Airway/Jaw/Neck:  Airway Findings: Mouth Opening: Normal Tongue: Normal  General Airway Assessment: Adult  Mallampati: I  TM Distance: Normal, at least 6 cm         Dental:  Dental Findings: Edentulous             Anesthesia Plan  Type of Anesthesia, risks & benefits discussed:  Anesthesia Type:  general  Patient's Preference:   Intra-op Monitoring Plan: standard ASA monitors  Intra-op Monitoring Plan Comments:   Post Op Pain Control Plan: per primary service following discharge from PACU  Post Op Pain Control Plan Comments:   Induction:   IV  Beta Blocker:         Informed Consent: Patient understands risks and agrees with Anesthesia plan.  Questions answered. Anesthesia consent signed with patient.  ASA Score: 3     Day of Surgery Review of History & Physical:    H&P update referred to the surgeon.         Ready For Surgery From Anesthesia Perspective.

## 2019-09-13 NOTE — BRIEF OP NOTE
9/13/2019: [facility]: CRISTELA & CV: DONATO: Mild spontaneous echocardiographic contrast. Moderate MR.  J to sinus rhythm.    Garrett Calderon M.D.

## 2019-09-13 NOTE — TRANSFER OF CARE
"Anesthesia Transfer of Care Note    Patient: Sandra Oseguera    Procedure(s) Performed: Procedure(s) (LRB):  ECHOCARDIOGRAM,TRANSESOPHAGEAL (N/A)  Cardioversion or Defibrillation    Patient location: PACU    Anesthesia Type: MAC    Transport from OR: Transported from OR on 2-3 L/min O2 by NC with adequate spontaneous ventilation    Post pain: adequate analgesia    Post assessment: no apparent anesthetic complications    Post vital signs: stable    Level of consciousness: awake, alert and oriented    Nausea/Vomiting: no nausea/vomiting    Complications: none    Transfer of care protocol was followed      Last vitals:   Visit Vitals  /88   Pulse (!) 129   Temp 36.5 °C (97.7 °F) (Oral)   Resp 18   Ht 5' 3" (1.6 m)   Wt 61.4 kg (135 lb 5.8 oz)   LMP  (LMP Unknown)   SpO2 100%   Breastfeeding? No   BMI 23.98 kg/m²     "

## 2019-09-13 NOTE — PROGRESS NOTES
[facility]  Cardiology  Progress Note    Patient Name: Sandra Oseguera  MRN: [id number]  Admission Date: 9/11/2019  Hospital Length of Stay: 2 days  Code Status: Full Code   Attending Physician: Kathy Pina MD   Primary Care Physician: Mauricio Sood MD  Expected Discharge Date:   Principal Problem:Heart failure, diastolic, acute on chronic    Subjective:     Brief HPI:    Sandra Oseguera is a 79 y.o.female with hypertension, hypercholesterolemia and diabetes mellitus type 2. She has rheumatoid arthritis involving knees, hands and back. In the summer of 2013 she developed progressive exertional dyspnea occasionally associated with mild chest pressure. She had an Echocardiogram that revealed findings consistent with hypertensive heart disease with a high LVEDP. She had low exercise tolerance on a Stress MPI but no defects. She was began on furosemide and after that she was breathing better. She had no exertional chest pain but mild to moderate exertional dyspnea if walking fast but no wheezing. Her heart rate was 70-80 bpm at rest and no longer raced with activities. As it appeared her exertional dyspnea was out of proportion to her cardiac findings she was referrred for PFTs that were done on 1/13/2017. They revealed small airway obstruction without significant response to inhalers. She saw Dr. Jarek Wade and was prescribed Anoro inhalers and a rescue inhaler. She also got enrolled in pulmonary rehabilitation and did 25 sessions. She was breathing better and thought her legs holds her back more than the breathing.      In late 8/2019 she noted palpitations and then slowly got more and more short of breath. She was seen by Dr. Mauricio Sood on 9/11/2019 and noted to be in atrial fibrillation with moderately fast VRR. She was admitted.    Hospital Course:     Diuresis.    Anticoagulation.    Rate control with metoprolol.    9/12/2019: Echo: Normal left ventricular size with  mild systolic dysfunction. EF 45%. Moderate LVH. Moderately dilated LA. Moderate MR. Severe TR. SPAP 67 mmHg.     9/13/2019: [facility]: CRISTELA & CV: DONATO: Mild spontaneous echocardiographic contrast. Moderate MR.  J to sinus rhythm.     Interval History:     Breathing easier. No CP. VRR been 90 - 110 bpm. After CV 60 bpm.    Review of Systems   Constitution: Negative for chills, fever and malaise/fatigue.   HENT: Negative for nosebleeds.    Eyes: Negative for vision loss in left eye and vision loss in right eye.   Cardiovascular: Positive for dyspnea on exertion and palpitations. Negative for chest pain, leg swelling, orthopnea and paroxysmal nocturnal dyspnea.   Respiratory: Positive for shortness of breath. Negative for cough, hemoptysis, sputum production and wheezing.    Hematologic/Lymphatic: Negative for bleeding problem.   Skin: Negative for rash.   Musculoskeletal: Negative for myalgias.   Gastrointestinal: Negative for abdominal pain, heartburn, hematemesis, hematochezia, jaundice, melena, nausea and vomiting.   Genitourinary: Negative for hematuria.   Neurological: Negative for dizziness, headaches, light-headedness, vertigo and weakness.   Psychiatric/Behavioral: Negative for altered mental status. The patient is not nervous/anxious.    Allergic/Immunologic: Negative for persistent infections.     Objective:     Vital Signs (Most Recent):  Temp: 97.7 °F (36.5 °C) (09/13/19 1144)  Pulse: (!) 129 (09/13/19 1710)  Resp: 18 (09/13/19 1710)  BP: 107/88 (09/13/19 1710)  SpO2: 100 % (09/13/19 1710) Vital Signs (24h Range):  Temp:  [97.5 °F (36.4 °C)-98.7 °F (37.1 °C)] 97.7 °F (36.5 °C)  Pulse:  [100-138] 129  Resp:  [14-37] 18  SpO2:  [91 %-100 %] 100 %  BP: (107-182)/() 107/88     Weight: 61.4 kg (135 lb 5.8 oz)  Body mass index is 23.98 kg/m².    SpO2: 100 %  O2 Device (Oxygen Therapy): nasal cannula    No intake or output data in the 24 hours ending 09/13/19 1733    Lines/Drains/Airways     Peripheral  Intravenous Line                 Peripheral IV - Single Lumen 09/11/19 1221 18 G Left Antecubital 2 days                Physical Exam   Constitutional: She is oriented to person, place, and time. She appears well-developed and well-nourished.  Non-toxic appearance. She does not appear ill. No distress.   Eyes: Right conjunctiva is not injected. Right conjunctiva has no hemorrhage. Left conjunctiva is not injected. Left conjunctiva has no hemorrhage.   Neck: No JVD present.   Cardiovascular: S1 normal and S2 normal. An irregularly irregular rhythm present. Tachycardia present. Exam reveals no gallop.   Murmur heard.  High-pitched blowing holosystolic murmur is present with a grade of 2/6 at the apex.  Pulmonary/Chest: Effort normal and breath sounds normal.   Abdominal: Normal appearance. There is no tenderness.   Musculoskeletal:        Right ankle: She exhibits no swelling.        Left ankle: She exhibits no swelling.   Neurological: She is alert and oriented to person, place, and time. She is not disoriented.   Skin: Skin is warm and dry. No rash noted.   Psychiatric: She has a normal mood and affect. Her speech is normal and behavior is normal. Cognition and memory are normal.       Current Medications:     apixaban  5 mg Oral BID    furosemide  40 mg Intravenous Daily    insulin detemir U-100  10 Units Subcutaneous QHS    metoprolol succinate  200 mg Oral Daily     Current Laboratory Results:    Recent Results (from the past 24 hour(s))   POCT glucose    Collection Time: 09/12/19  6:01 PM   Result Value Ref Range    POCT Glucose 234 (H) 70 - 110 mg/dL   POCT glucose    Collection Time: 09/12/19  9:15 PM   Result Value Ref Range    POCT Glucose 273 (H) 70 - 110 mg/dL   Comprehensive metabolic panel    Collection Time: 09/13/19  5:16 AM   Result Value Ref Range    Sodium 138 136 - 145 mmol/L    Potassium 4.1 3.5 - 5.1 mmol/L    Chloride 102 95 - 110 mmol/L    CO2 27 23 - 29 mmol/L    Glucose 193 (H) 70 - 110  mg/dL    BUN, Bld 28 (H) 8 - 23 mg/dL    Creatinine 1.3 0.5 - 1.4 mg/dL    Calcium 9.3 8.7 - 10.5 mg/dL    Total Protein 6.8 6.0 - 8.4 g/dL    Albumin 3.4 (L) 3.5 - 5.2 g/dL    Total Bilirubin 1.1 (H) 0.1 - 1.0 mg/dL    Alkaline Phosphatase 94 55 - 135 U/L    AST 40 10 - 40 U/L    ALT 69 (H) 10 - 44 U/L    Anion Gap 9 8 - 16 mmol/L    eGFR if African American 45 (A) >60 mL/min/1.73 m^2    eGFR if non African American 39 (A) >60 mL/min/1.73 m^2   Lipid panel    Collection Time: 09/13/19  5:16 AM   Result Value Ref Range    Cholesterol 118 (L) 120 - 199 mg/dL    Triglycerides 75 30 - 150 mg/dL    HDL 44 40 - 75 mg/dL    LDL Cholesterol 59.0 (L) 63.0 - 159.0 mg/dL    Hdl/Cholesterol Ratio 37.3 20.0 - 50.0 %    Total Cholesterol/HDL Ratio 2.7 2.0 - 5.0    Non-HDL Cholesterol 74 mg/dL   POCT glucose    Collection Time: 09/13/19  6:34 AM   Result Value Ref Range    POCT Glucose 168 (H) 70 - 110 mg/dL   POCT glucose    Collection Time: 09/13/19 11:45 AM   Result Value Ref Range    POCT Glucose 205 (H) 70 - 110 mg/dL   Transesophageal echo (CRISTELA)    Collection Time: 09/13/19  5:12 PM   Result Value Ref Range    BSA 1.64 m2     9/12/2019: Echo:    Moderate concentric left ventricular hypertrophy.  Mildly decreased left ventricular systolic function. The estimated ejection fraction is 45%  Mild global hypokinetic wall motion.  Atrial fibrillation observed.  Normal right ventricular systolic function.  Moderate left atrial enlargement.  Moderate right atrial enlargement.  Moderate mitral regurgitation.  Severe tricuspid regurgitation.  Moderate pulmonary hypertension present.  Intermediate central venous pressure (8 mm Hg).  The estimated PA systolic pressure is 67 mm Hg    Current Imaging Results:    X-Ray Chest AP Portable   Final Result      As above         Electronically signed by: Lula Lou MD   Date:    09/11/2019   Time:    13:11          Assessment and Plan:     Problem List:    Active Diagnoses:    Diagnosis  Date Noted POA    PRINCIPAL PROBLEM:  Heart failure, diastolic, acute on chronic [I50.33] 03/03/2014 Yes    Atrial fibrillation [I48.91] 09/11/2019 Yes    Chronic anticoagulation [Z79.01] 09/13/2019 Not Applicable    Right bundle branch block [I45.10] 09/12/2018 Yes    Shortness of breath [R06.02] 02/03/2014 Yes    Hypertension [I10] 02/03/2014 Yes    Hypercholesterolemia [E78.00] 12/01/2014 Yes    Type 2 diabetes mellitus with stage 2 chronic kidney disease, with long-term current use of insulin [E11.22, N18.2, Z79.4] 02/03/2014 Not Applicable    Chronic kidney disease, stage II (mild) [N18.2] 02/03/2014 Yes    Rheumatoid arthritis [M06.9] 12/01/2014 Yes    Abnormal liver enzymes [R74.8] 09/11/2019 Yes      Problems Resolved During this Admission:     Assessment and Plan:     1. Heart Failure, Diastolic, Acute on Chronic              1/10/2014: CXR: WNL.              2/13/2014: Echo: Normal LV size and function. Mild LVH. Moderate diastolic dysfunction. Elevated LVEDP. Mildly dilated LA.              2/13/2014: Stress MPI: 3:30 min. No CP. Low exercise tolerance. ECG negative. MPI negative.              Suspect exertional dyspnea related to diastolic dysfunction and high LVEDP as well as her pulmonary disease.              Significantly less short of breath since on furosemide 20 mg Q24.              As heart rate appeared to go up with exertion to a level where she felt palpitations increased dose of metoprolol to 100 mg Q24.              9/11/2019: Presents in HF probably due to atrial fibrillation.   9/12/2019: Echo: Normal left ventricular size with mild systolic dysfunction. EF 45%. Moderate LVH. Moderately dilated LA. Moderate MR. Severe TR. SPAP 67 mmHg.               Diuresis.     2. Atrial Fibrillation              8/2019: Suspect onset of persistent atrial fibrillation with moderately fast VRR.              [id number] ([id number]).              On metoprolol 100 mg - increased to 200 mg Q24.               Anticoagulation with apixiban 5 mg Q12.   9/13/2019: [facility]: CRISTELA & CV: DONATO: Mild spontaneous echocardiographic contrast. Moderate MR.  J to sinus rhythm.      3. Chronic Anticoagulation              8/2019: Suspect onset of persistent atrial fibrillation with moderately fast VRR.              [id number] ([id number]).              Anticoagulation with apixiban 5 mg Q12.     4. Right Bundle Branch Block              2018: Diagnosed with RBBB.     5. Shortness of Breath              8/2013: Began experience exertional dyspnea.              1/13/2017: PFTs: Small airway obstruction without significant response to inhalers.              On Anoro inhaler and Ventoline and was in pulmonary rehabilitation program.              Stable.              Dr. Jarek Wade.     6. Hypertension              1992: Diagnosed.              At home been on metoprolol 100 mg Q24, benazepril 20 mg Q24 and furosemide 20 mg Q24.              Keeping log at home.              Overall well controlled.     7. Hypercholesterolemia              1992: Diagnosed.              On atorvastatin 20 mg Q24.              Tells me well controlled.     8. Diabetes Mellitus, Type 2              1992: Diagnosed. Complications: CKD2. Medications: Oral agent.              On metformin and Trajenta.              On aspirin 81 mg Q24.              Well controlled.     9. Chronic Kidney Disease, Stage 2              3/3/2015: BUN/crea 15/1.0. CrCl 65.              Stable.     10. Rheumatoid Arthritis.              2006: Diagnosed. Involvement: Hands, knees and back.              On methotrexate and folic acid.              Dr. Ezequiel Figueroa Jr..     11. Primary Care              Dr. Mauricio Sood.      VTE Risk Mitigation (From admission, onward)        Ordered     apixaban tablet 5 mg  2 times daily      09/11/19 1934     Place sequential compression device  Until discontinued      09/11/19 1644     IP VTE HIGH RISK PATIENT  Once      09/11/19  1644     Reason for No Pharmacological VTE Prophylaxis  Once      09/11/19 1644          Garrett Calderon MD  Cardiology  [facility]

## 2019-09-13 NOTE — PLAN OF CARE
Problem: Adult Inpatient Plan of Care  Goal: Absence of Hospital-Acquired Illness or Injury    Intervention: Identify and Manage Fall Risk  plan of care reviewed with pt, medications explained, patient currently resting in bed, comfortably and appears to be resting in between care. VSS, bed in lowest locked postition, call light in reach, purposeful rounding done. No Falls during the shift patient ambulated in room with no issues.Blood glucose monitored. No new needs identified at this time will continue to monitor.

## 2019-09-14 NOTE — PROGRESS NOTES
"[facility] Hospital Medicine  Progress Note    Patient Name: Sandra Oseguera  MRN: [id number]  Patient Class: IP- Inpatient   Admission Date: 9/11/2019  Length of Stay: 3 days  Attending Physician: Julia Floyd MD  Primary Care Provider: Mauricio Sood MD        Subjective:     Principal Problem:Heart failure, diastolic, acute on chronic        HPI:  Ms. Oseguera is a 79 year old woman who presented to her PCP's office for a routine visit today, was found to be in rapid atrial fibrillation and was sent to the hospital.  Patient reports she has had poor exercise tolerance for longer than a year, with shortness of breath and dyspnea with minimal activity.  This is associated with a tight feeling in her chest and discomfort in her thighs and ankles.  She has had swelling of her lower extremities that improves when she puts up her feet.  On arrival she was in atrial fibrillation with .  Labs were notable for mild anemia at baseline, BUN/creatinine 25/1.6, AST//117, troponin 0.093, .  CXR showed possible small effusions vs atelectasis.  She is being admitted for further workup and treatment.    Medical history includes HTN and chronic diastolic heart failure with most recent EF 76% and diastolic dysfunction in 2017.  There was also mild to moderate mitral regurgitation.  She has rheumatoid arthritis and is on MTX 10 mg every Monday.  She also takes Aleve once in awhile although says "I know I'm not supposed to."  She had a stress echo some time ago which was notable for poor exercise tolerance.  She has type II diabetes and is on Tradjenta and Basaglar insulin.  She is followed by Dr. Jarek Wade for small airways disease for which she is on Anoro Ellipta and a rescue inhaler.  She uses the inhaler frequently but says it doesn't relieve the shortness of breath.  She has been told she has stage II CKD.  She has never been a smoker.    Overview/Hospital Course:  Patient was " admitted and was seen by cardiology.  She was continued on double the dose of her beta blocker and was started on apixaban.  Plan was made for cardioversion on 9/13, which was done late in the day and she converted to sinus rhythm.    Interval History: still c/o some shortness of breath but better than before.    Review of Systems   Constitutional: Negative for chills and fever.   HENT: Negative for sinus pain and trouble swallowing.    Respiratory: Positive for shortness of breath. Negative for cough.    Cardiovascular: Negative for chest pain and leg swelling.   Gastrointestinal: Negative for nausea and vomiting.   Genitourinary: Negative for dysuria and hematuria.   Musculoskeletal: Negative for gait problem.   Skin: Negative for rash.   Neurological: Positive for weakness. Negative for headaches.   Psychiatric/Behavioral: Negative for confusion.     Objective:     Vital Signs (Most Recent):  Temp: 97.6 °F (36.4 °C) (09/14/19 1218)  Pulse: 67 (09/14/19 1400)  Resp: 18 (09/14/19 1218)  BP: (!) 140/68 (09/14/19 1218)  SpO2: 100 % (09/14/19 1218) Vital Signs (24h Range):  Temp:  [97.6 °F (36.4 °C)-98.3 °F (36.8 °C)] 97.6 °F (36.4 °C)  Pulse:  [] 67  Resp:  [16-22] 18  SpO2:  [91 %-100 %] 100 %  BP: (107-182)/() 140/68     Weight: 61.4 kg (135 lb 5.8 oz)  Body mass index is 23.98 kg/m².  No intake or output data in the 24 hours ending 09/14/19 1549   Physical Exam   Constitutional: She is oriented to person, place, and time. No distress.   HENT:   Head: Normocephalic and atraumatic.   Eyes: Pupils are equal, round, and reactive to light. EOM are normal.   Neck: Normal range of motion. Neck supple.   Cardiovascular: Normal rate and regular rhythm.   Pulmonary/Chest: Effort normal and breath sounds normal. No respiratory distress.   Abdominal: Soft. Bowel sounds are normal. She exhibits no distension. There is no tenderness.   Musculoskeletal: Normal range of motion. She exhibits no edema.   Neurological:  "She is alert and oriented to person, place, and time. No cranial nerve deficit.   Skin: Skin is warm.   Psychiatric: She has a normal mood and affect.   Vitals reviewed.      Significant Labs:   CMP:   Recent Labs   Lab 09/13/19  0516 09/14/19  0528    139   K 4.1 3.6    102   CO2 27 28   * 228*   BUN 28* 28*   CREATININE 1.3 1.4   CALCIUM 9.3 9.1   PROT 6.8 6.4   ALBUMIN 3.4* 3.2*   BILITOT 1.1* 1.3*   ALKPHOS 94 78   AST 40 33   ALT 69* 52*   ANIONGAP 9 9   EGFRNONAA 39* 36*       Significant Imaging: I have reviewed all pertinent imaging results/findings within the past 24 hours.      Assessment/Plan:      * Heart failure, diastolic, acute on chronic  - BNP elevated and CXR shows possible effusions.  She has lower extremity edema.    - In addition has severe TR with pulmonary HTN.  - Will diurese with IV Lasix once daily.  - Monitor renal function closely.  - Has elevated transaminases but normal alkaline phosphatase/albumin.  Might be hepatic congestion.      Chronic anticoagulation  - Apixaban started this admission.      Abnormal liver enzymes  - As above under "heart failure"  - improving  - Will hold Lipitor for now.  Cholesterol level only 118 with LDL 59.      Atrial fibrillation  - Presented with atrial fibrillation associated with palpitations and shortness of breath  - Unclear how long she has had this as the symptoms have actually been present for well over a year without atrial fibrillation being diagnosed.  - Cardiology consulted - followed in office by Dr. Calderon.  Increasing her beta blocker to metoprolol  mg daily and adding apixaban.  - Patient also with chest tightness and has slightly elevated troponin.  Previous stress echo showed poor exercise tolerance but no WMA.    - Repeat 2D echo showed severe TR and pulmonary HTN.  EF was depressed at 45% with patient in atrial fibrillation.  - s/p cardioversion 9/13 and converted to sinus rhythm    Rheumatoid arthritis  - " Patient on weekly MTX (Mondays).  Not likely to require this while she is here.  - Monitor      Chronic kidney disease, stage II (mild)  - CKD II diagnosed some years ago.  Creatinine now 1.3 and has been improving.  - She admits to occasionally taking Aleve and she is on benazopril  and diuretics.  Might have mild ATN.  Would expect this to resolve.  - ace restarted.  She has had some improvement since admission.  Monitor closely.      Type 2 diabetes mellitus with stage 2 chronic kidney disease, with long-term current use of insulin  - Patient reports well controlled on Tradjenta and Basaglar 10 units daily and 15 units every 5th day.  - Low dose SSI for now.  Started long-acting insulin 10 units nightly, increase to 12 units .  HbA1c 8.3        VTE Risk Mitigation (From admission, onward)        Ordered     apixaban tablet 5 mg  2 times daily      09/11/19 1934     Place sequential compression device  Until discontinued      09/11/19 1644     IP VTE HIGH RISK PATIENT  Once      09/11/19 1644     Reason for No Pharmacological VTE Prophylaxis  Once      09/11/19 1644                Julia Floyd MD  Department of Hospital Medicine   [facility]

## 2019-09-14 NOTE — PLAN OF CARE
Problem: Adult Inpatient Plan of Care  Goal: Plan of Care Review  Outcome: Ongoing (interventions implemented as appropriate)  POC reviewed. No significant events this shift. Cardiac monitor maintained. VSS on RA. No complaints of pain. Pt voids and shifts in bed independently. Bed lowered and locked, side rails up x3, call light within reach. Will continue to monitor.

## 2019-09-14 NOTE — ASSESSMENT & PLAN NOTE
- Patient reports well controlled on Tradjenta and Basaglar 10 units daily and 15 units every 5th day.  - Low dose SSI for now.  Started long-acting insulin 10 units nightly.  HbA1c 8.3

## 2019-09-14 NOTE — PROGRESS NOTES
"[facility] Hospital Medicine  Progress Note    Patient Name: Sandra Oseguera  MRN: [id number]  Patient Class: IP- Inpatient   Admission Date: 9/11/2019  Length of Stay: 2 days  Attending Physician: Kathy Pina MD  Primary Care Provider: Mauricio Sood MD        Subjective:     Principal Problem:Heart failure, diastolic, acute on chronic        HPI:  Ms. Oseguera is a 79 year old woman who presented to her PCP's office for a routine visit today, was found to be in rapid atrial fibrillation and was sent to the hospital.  Patient reports she has had poor exercise tolerance for longer than a year, with shortness of breath and dyspnea with minimal activity.  This is associated with a tight feeling in her chest and discomfort in her thighs and ankles.  She has had swelling of her lower extremities that improves when she puts up her feet.  On arrival she was in atrial fibrillation with .  Labs were notable for mild anemia at baseline, BUN/creatinine 25/1.6, AST//117, troponin 0.093, .  CXR showed possible small effusions vs atelectasis.  She is being admitted for further workup and treatment.    Medical history includes HTN and chronic diastolic heart failure with most recent EF 76% and diastolic dysfunction in 2017.  There was also mild to moderate mitral regurgitation.  She has rheumatoid arthritis and is on MTX 10 mg every Monday.  She also takes Aleve once in awhile although says "I know I'm not supposed to."  She had a stress echo some time ago which was notable for poor exercise tolerance.  She has type II diabetes and is on Tradjenta and Basaglar insulin.  She is followed by Dr. Jarek Wade for small airways disease for which she is on Anoro Ellipta and a rescue inhaler.  She uses the inhaler frequently but says it doesn't relieve the shortness of breath.  She has been told she has stage II CKD.  She has never been a smoker.    Overview/Hospital Course:  Patient " was admitted and was seen by cardiology.  She was continued on double the dose of her beta blocker and was started on apixaban.  Plan was made for cardioversion on 9/13, which was done late in the day.    Interval History:  Patient went for cardioversion but there was an emergent need to cath another patient and it was postponed until this evening.  She is NPO.  Very short of breath.  HR remains elevated.    Review of Systems   Constitutional: Negative for chills and fever.   Respiratory: Positive for chest tightness and shortness of breath. Negative for cough.    Cardiovascular: Positive for leg swelling. Negative for chest pain and palpitations.     Objective:     Vital Signs (Most Recent):  Temp: 97.7 °F (36.5 °C) (09/13/19 1825)  Pulse: 63 (09/13/19 1909)  Resp: 18 (09/13/19 1825)  BP: (!) 116/57 (09/13/19 1825)  SpO2: 100 % (09/13/19 1825) Vital Signs (24h Range):  Temp:  [97.7 °F (36.5 °C)-98.7 °F (37.1 °C)] 97.7 °F (36.5 °C)  Pulse:  [] 63  Resp:  [14-37] 18  SpO2:  [91 %-100 %] 100 %  BP: (107-182)/() 116/57     Weight: 61.4 kg (135 lb 5.8 oz)  Body mass index is 23.98 kg/m².  No intake or output data in the 24 hours ending 09/13/19 1932   Physical Exam   Constitutional: She is oriented to person, place, and time. She appears well-developed and well-nourished.   HENT:   Head: Normocephalic.   Eyes: Pupils are equal, round, and reactive to light. Conjunctivae are normal.   Neck: Neck supple. No thyromegaly present.   Cardiovascular: Intact distal pulses. Exam reveals no gallop and no friction rub.   Murmur heard.  Irregularly irregular rhythm;    Pulmonary/Chest: Effort normal and breath sounds normal. No respiratory distress. She has no wheezes. She has no rales.   Abdominal: Soft. Bowel sounds are normal. She exhibits no distension. There is no tenderness.   Musculoskeletal: Normal range of motion. She exhibits edema.   Trace lower extremity edema.   Lymphadenopathy:     She has no  "cervical adenopathy.   Neurological: She is alert and oriented to person, place, and time.   Strength equal and symmetric   Skin: Skin is warm and dry. No rash noted.   Psychiatric: She has a normal mood and affect. Her behavior is normal. Thought content normal.       Significant Labs: All pertinent labs within the past 24 hours have been reviewed.    Significant Imaging: I have reviewed all pertinent imaging results/findings within the past 24 hours.      Assessment/Plan:      * Heart failure, diastolic, acute on chronic  - BNP elevated and CXR shows possible effusions.  She has lower extremity edema.    - In addition has severe TR with pulmonary HTN.  - Will diurese with IV Lasix once daily.  - Monitor renal function closely.  - Has elevated transaminases but normal alkaline phosphatase/albumin.  Might be hepatic congestion.      Atrial fibrillation  - Presented with atrial fibrillation associated with palpitations and shortness of breath  - Unclear how long she has had this as the symptoms have actually been present for well over a year without atrial fibrillation being diagnosed.  - Cardiology consulted - followed in office by Dr. Calderon.  Increasing her beta blocker to metoprolol  mg daily and adding apixaban.  - Patient also with chest tightness and has slightly elevated troponin.  Previous stress echo showed poor exercise tolerance but no WMA.    - Repeat 2D echo showed severe TR and pulmonary HTN.  EF was depressed at 45% with patient in atrial fibrillation.    Chronic anticoagulation  - Apixaban started this admission.      Abnormal liver enzymes  - As above under "heart failure"  - Will hold Lipitor for now.  Cholesterol level only 118 with LDL 59.      Rheumatoid arthritis  - Patient on weekly MTX (Mondays).  Not likely to require this while she is here.  - Monitor      Chronic kidney disease, stage II (mild)  - CKD II diagnosed some years ago.  Creatinine now 1.3 and has been improving.  - She " admits to occasionally taking Aleve and she is on lisinopril and diuretics.  Might have mild ATN.  Would expect this to resolve.  - Lisinopril held.  She has had some improvement since admission.  Monitor closely.      Type 2 diabetes mellitus with stage 2 chronic kidney disease, with long-term current use of insulin  - Patient reports well controlled on Tradjenta and Basaglar 10 units daily and 15 units every 5th day.  - Low dose SSI for now.  Started long-acting insulin 10 units nightly.  HbA1c 8.3        VTE Risk Mitigation (From admission, onward)        Ordered     apixaban tablet 5 mg  2 times daily      09/11/19 1934     Place sequential compression device  Until discontinued      09/11/19 1644     IP VTE HIGH RISK PATIENT  Once      09/11/19 1644     Reason for No Pharmacological VTE Prophylaxis  Once      09/11/19 1644                Kathy Chow MD  Department of Hospital Medicine   [facility]

## 2019-09-14 NOTE — ASSESSMENT & PLAN NOTE
- Patient reports well controlled on Tradjenta and Basaglar 10 units daily and 15 units every 5th day.  - Low dose SSI for now.  Started long-acting insulin 10 units nightly, increase to 12 units .  HbA1c 8.3

## 2019-09-14 NOTE — SUBJECTIVE & OBJECTIVE
Interval History:  Patient went for cardioversion but there was an emergent need to cath another patient and it was postponed until this evening.  She is NPO.  Very short of breath.  HR remains elevated.    Review of Systems   Constitutional: Negative for chills and fever.   Respiratory: Positive for chest tightness and shortness of breath. Negative for cough.    Cardiovascular: Positive for leg swelling. Negative for chest pain and palpitations.     Objective:     Vital Signs (Most Recent):  Temp: 97.7 °F (36.5 °C) (09/13/19 1825)  Pulse: 63 (09/13/19 1909)  Resp: 18 (09/13/19 1825)  BP: (!) 116/57 (09/13/19 1825)  SpO2: 100 % (09/13/19 1825) Vital Signs (24h Range):  Temp:  [97.7 °F (36.5 °C)-98.7 °F (37.1 °C)] 97.7 °F (36.5 °C)  Pulse:  [] 63  Resp:  [14-37] 18  SpO2:  [91 %-100 %] 100 %  BP: (107-182)/() 116/57     Weight: 61.4 kg (135 lb 5.8 oz)  Body mass index is 23.98 kg/m².  No intake or output data in the 24 hours ending 09/13/19 1932   Physical Exam   Constitutional: She is oriented to person, place, and time. She appears well-developed and well-nourished.   HENT:   Head: Normocephalic.   Eyes: Pupils are equal, round, and reactive to light. Conjunctivae are normal.   Neck: Neck supple. No thyromegaly present.   Cardiovascular: Intact distal pulses. Exam reveals no gallop and no friction rub.   Murmur heard.  Irregularly irregular rhythm;    Pulmonary/Chest: Effort normal and breath sounds normal. No respiratory distress. She has no wheezes. She has no rales.   Abdominal: Soft. Bowel sounds are normal. She exhibits no distension. There is no tenderness.   Musculoskeletal: Normal range of motion. She exhibits edema.   Trace lower extremity edema.   Lymphadenopathy:     She has no cervical adenopathy.   Neurological: She is alert and oriented to person, place, and time.   Strength equal and symmetric   Skin: Skin is warm and dry. No rash noted.   Psychiatric: She has a normal mood and  affect. Her behavior is normal. Thought content normal.       Significant Labs: All pertinent labs within the past 24 hours have been reviewed.    Significant Imaging: I have reviewed all pertinent imaging results/findings within the past 24 hours.

## 2019-09-14 NOTE — ASSESSMENT & PLAN NOTE
- Presented with atrial fibrillation associated with palpitations and shortness of breath  - Unclear how long she has had this as the symptoms have actually been present for well over a year without atrial fibrillation being diagnosed.  - Cardiology consulted - followed in office by Dr. Calderon.  Increasing her beta blocker to metoprolol  mg daily and adding apixaban.  - Patient also with chest tightness and has slightly elevated troponin.  Previous stress echo showed poor exercise tolerance but no WMA.    - Repeat 2D echo showed severe TR and pulmonary HTN.  EF was depressed at 45% with patient in atrial fibrillation.  - s/p cardioversion 9/13 and converted to sinus rhythm

## 2019-09-14 NOTE — ASSESSMENT & PLAN NOTE
"- As above under "heart failure"  - improving  - Will hold Lipitor for now.  Cholesterol level only 118 with LDL 59.    "

## 2019-09-14 NOTE — ASSESSMENT & PLAN NOTE
- CKD II diagnosed some years ago.  Creatinine now 1.3 and has been improving.  - She admits to occasionally taking Aleve and she is on lisinopril and diuretics.  Might have mild ATN.  Would expect this to resolve.  - Lisinopril held.  She has had some improvement since admission.  Monitor closely.

## 2019-09-14 NOTE — PROGRESS NOTES
Received call from Skylar in Recovery. She reported that the patient is doing well following cardioversion and is now in sinus rhythm with PVCs and a BBB. Reported that VS are stable with HR in low 60's. Patient currently denying any SOB on room air with O2 Sat of 100%. Our cardiac monitor sent down with patient is currently still on patient according to Skylar. Waiting for patient to return to room 313.

## 2019-09-14 NOTE — ASSESSMENT & PLAN NOTE
"- As above under "heart failure"  - Will hold Lipitor for now.  Cholesterol level only 118 with LDL 59.    "

## 2019-09-14 NOTE — PROGRESS NOTES
[facility]  Cardiology  Progress Note    Patient Name: Sandra Oseguera  MRN: [id number]  Admission Date: 9/11/2019  Hospital Length of Stay: 3 days  Code Status: Full Code   Attending Physician: Julia Floyd MD   Primary Care Physician: Mauricio Sood MD  Expected Discharge Date:   Principal Problem:Heart failure, diastolic, acute on chronic    Subjective:     Brief HPI:    Sandra Oseguera is a 79 y.o.female with hypertension, hypercholesterolemia and diabetes mellitus type 2. She has rheumatoid arthritis involving knees, hands and back. In the summer of 2013 she developed progressive exertional dyspnea occasionally associated with mild chest pressure. She had an Echocardiogram that revealed findings consistent with hypertensive heart disease with a high LVEDP. She had low exercise tolerance on a Stress MPI but no defects. She was began on furosemide and after that she was breathing better. She had no exertional chest pain but mild to moderate exertional dyspnea if walking fast but no wheezing. Her heart rate was 70-80 bpm at rest and no longer raced with activities. As it appeared her exertional dyspnea was out of proportion to her cardiac findings she was referrred for PFTs that were done on 1/13/2017. They revealed small airway obstruction without significant response to inhalers. She saw Dr. Jarek Wade and was prescribed Anoro inhalers and a rescue inhaler. She also got enrolled in pulmonary rehabilitation and did 25 sessions. She was breathing better and thought her legs holds her back more than the breathing.      In late 8/2019 she noted palpitations and then slowly got more and more short of breath. She was seen by Dr. Mauricio Sood on 9/11/2019 and noted to be in atrial fibrillation with moderately fast VRR. She was admitted.    Hospital Course:     Diuresis.    Anticoagulation.    Rate control with metoprolol.    9/12/2019: Echo: Normal left ventricular size with mild  systolic dysfunction. EF 45%. Moderate LVH. Moderately dilated LA. Moderate MR. Severe TR. SPAP 67 mmHg.     9/13/2019: [facility]: CRISTELA & CV: DONATO: Mild spontaneous echocardiographic contrast. Moderate MR.  J to sinus rhythm.     Interval History:     Weak. Breathing well. No CP. Remains in sinus rhythm.    Review of Systems   Constitution: Negative for chills, fever and malaise/fatigue.   HENT: Negative for nosebleeds.    Eyes: Negative for vision loss in left eye and vision loss in right eye.   Cardiovascular: Negative for chest pain, dyspnea on exertion, leg swelling, orthopnea, palpitations and paroxysmal nocturnal dyspnea.   Respiratory: Negative for cough, hemoptysis, shortness of breath, sputum production and wheezing.    Hematologic/Lymphatic: Negative for bleeding problem.   Skin: Negative for rash.   Musculoskeletal: Negative for myalgias.   Gastrointestinal: Negative for abdominal pain, heartburn, hematemesis, hematochezia, jaundice, melena, nausea and vomiting.   Genitourinary: Negative for hematuria.   Neurological: Negative for dizziness, headaches, light-headedness, vertigo and weakness.   Psychiatric/Behavioral: Negative for altered mental status. The patient is not nervous/anxious.    Allergic/Immunologic: Negative for persistent infections.     Objective:     Vital Signs (Most Recent):  Temp: 97.6 °F (36.4 °C) (09/14/19 1218)  Pulse: 67 (09/14/19 1400)  Resp: 18 (09/14/19 1218)  BP: (!) 140/68 (09/14/19 1218)  SpO2: 100 % (09/14/19 1218) Vital Signs (24h Range):  Temp:  [97.6 °F (36.4 °C)-98.3 °F (36.8 °C)] 97.6 °F (36.4 °C)  Pulse:  [] 67  Resp:  [16-37] 18  SpO2:  [91 %-100 %] 100 %  BP: (107-182)/() 140/68     Weight: 61.4 kg (135 lb 5.8 oz)  Body mass index is 23.98 kg/m².    SpO2: 100 %  O2 Device (Oxygen Therapy): room air    No intake or output data in the 24 hours ending 09/14/19 1459    Lines/Drains/Airways     Peripheral Intravenous Line                 Peripheral IV - Single  Lumen 09/11/19 1221 18 G Left Antecubital 3 days                Physical Exam   Constitutional: She is oriented to person, place, and time. She appears well-developed and well-nourished.  Non-toxic appearance. She does not appear ill. No distress.   Eyes: Right conjunctiva is not injected. Right conjunctiva has no hemorrhage. Left conjunctiva is not injected. Left conjunctiva has no hemorrhage.   Neck: No JVD present.   Cardiovascular: S1 normal and S2 normal. An irregularly irregular rhythm present. Tachycardia present. Exam reveals no gallop.   Murmur heard.  High-pitched blowing holosystolic murmur is present with a grade of 2/6 at the apex.  Pulmonary/Chest: Effort normal and breath sounds normal.   Abdominal: Normal appearance. There is no tenderness.   Musculoskeletal:        Right ankle: She exhibits no swelling.        Left ankle: She exhibits no swelling.   Neurological: She is alert and oriented to person, place, and time. She is not disoriented.   Skin: Skin is warm and dry. No rash noted.   Psychiatric: She has a normal mood and affect. Her speech is normal and behavior is normal. Cognition and memory are normal.       Current Medications:     apixaban  5 mg Oral BID    furosemide  40 mg Intravenous Daily    insulin detemir U-100  10 Units Subcutaneous QHS    metoprolol succinate  200 mg Oral Daily     Current Laboratory Results:    Recent Results (from the past 24 hour(s))   Transesophageal echo (CRISTELA)    Collection Time: 09/13/19  5:12 PM   Result Value Ref Range    BSA 1.64 m2   POCT glucose    Collection Time: 09/13/19  8:44 PM   Result Value Ref Range    POCT Glucose 153 (H) 70 - 110 mg/dL   POCT glucose    Collection Time: 09/13/19 11:49 PM   Result Value Ref Range    POCT Glucose 204 (H) 70 - 110 mg/dL   Comprehensive metabolic panel    Collection Time: 09/14/19  5:28 AM   Result Value Ref Range    Sodium 139 136 - 145 mmol/L    Potassium 3.6 3.5 - 5.1 mmol/L    Chloride 102 95 - 110 mmol/L     CO2 28 23 - 29 mmol/L    Glucose 228 (H) 70 - 110 mg/dL    BUN, Bld 28 (H) 8 - 23 mg/dL    Creatinine 1.4 0.5 - 1.4 mg/dL    Calcium 9.1 8.7 - 10.5 mg/dL    Total Protein 6.4 6.0 - 8.4 g/dL    Albumin 3.2 (L) 3.5 - 5.2 g/dL    Total Bilirubin 1.3 (H) 0.1 - 1.0 mg/dL    Alkaline Phosphatase 78 55 - 135 U/L    AST 33 10 - 40 U/L    ALT 52 (H) 10 - 44 U/L    Anion Gap 9 8 - 16 mmol/L    eGFR if African American 42 (A) >60 mL/min/1.73 m^2    eGFR if non African American 36 (A) >60 mL/min/1.73 m^2   POCT glucose    Collection Time: 09/14/19  7:02 AM   Result Value Ref Range    POCT Glucose 204 (H) 70 - 110 mg/dL   POCT glucose    Collection Time: 09/14/19 11:24 AM   Result Value Ref Range    POCT Glucose 198 (H) 70 - 110 mg/dL     9/12/2019: Echo:    Moderate concentric left ventricular hypertrophy.  Mildly decreased left ventricular systolic function. The estimated ejection fraction is 45%  Mild global hypokinetic wall motion.  Atrial fibrillation observed.  Normal right ventricular systolic function.  Moderate left atrial enlargement.  Moderate right atrial enlargement.  Moderate mitral regurgitation.  Severe tricuspid regurgitation.  Moderate pulmonary hypertension present.  Intermediate central venous pressure (8 mm Hg).  The estimated PA systolic pressure is 67 mm Hg    Current Imaging Results:    X-Ray Chest AP Portable   Final Result      As above         Electronically signed by: Lula Lou MD   Date:    09/11/2019   Time:    13:11          Assessment and Plan:     Problem List:    Active Diagnoses:    Diagnosis Date Noted POA    PRINCIPAL PROBLEM:  Heart failure, diastolic, acute on chronic [I50.33] 03/03/2014 Yes    Atrial fibrillation [I48.91] 09/11/2019 Yes    Chronic anticoagulation [Z79.01] 09/13/2019 Not Applicable    Right bundle branch block [I45.10] 09/12/2018 Yes    Shortness of breath [R06.02] 02/03/2014 Yes    Hypertension [I10] 02/03/2014 Yes    Hypercholesterolemia [E78.00] 12/01/2014  Yes    Type 2 diabetes mellitus with stage 2 chronic kidney disease, with long-term current use of insulin [E11.22, N18.2, Z79.4] 02/03/2014 Not Applicable    Chronic kidney disease, stage II (mild) [N18.2] 02/03/2014 Yes    Rheumatoid arthritis [M06.9] 12/01/2014 Yes    Abnormal liver enzymes [R74.8] 09/11/2019 Yes      Problems Resolved During this Admission:     Assessment and Plan:     1. Heart Failure, Diastolic, Acute on Chronic              1/10/2014: CXR: WNL.              2/13/2014: Echo: Normal LV size and function. Mild LVH. Moderate diastolic dysfunction. Elevated LVEDP. Mildly dilated LA.              2/13/2014: Stress MPI: 3:30 min. No CP. Low exercise tolerance. ECG negative. MPI negative.              Suspect exertional dyspnea related to diastolic dysfunction and high LVEDP as well as her pulmonary disease.              Significantly less short of breath since on furosemide 20 mg Q24.              As heart rate appeared to go up with exertion to a level where she felt palpitations increased dose of metoprolol to 100 mg Q24.              9/11/2019: Presents in HF probably due to atrial fibrillation.   9/12/2019: Echo: Normal left ventricular size with mild systolic dysfunction. EF 45%. Moderate LVH. Moderately dilated LA. Moderate MR. Severe TR. SPAP 67 mmHg.               Diuresis.   Change furosemide to 40 mg Q24.     2. Atrial Fibrillation              8/2019: Suspect onset of persistent atrial fibrillation with moderately fast VRR.              [id number] ([id number]).              On metoprolol 100 mg - increased to 200 mg Q24.              Anticoagulation with apixiban 5 mg Q12.   9/13/2019: [facility]: CRISTELA & CV: DONATO: Mild spontaneous echocardiographic contrast. Moderate MR.  J to sinus rhythm.    On metoprolol 200 mg Q24.     3. Chronic Anticoagulation              8/2019: Suspect onset of persistent atrial fibrillation with moderately fast VRR.              [id number] ([id number]).               On apixiban 5 mg Q12.     4. Right Bundle Branch Block              2018: Diagnosed with RBBB.     5. Shortness of Breath              8/2013: Began experience exertional dyspnea.              1/13/2017: PFTs: Small airway obstruction without significant response to inhalers.              On Anoro inhaler and Ventoline and was in pulmonary rehabilitation program.              Stable.              Dr. Jarek Wade.     6. Hypertension              1992: Diagnosed.              At home been on metoprolol 100 mg Q24, benazepril 20 mg Q24 and furosemide 20 mg Q24.              Keeping log at home.              Overall well controlled.   On metoprolol 200 mg Q24 and furosemide 40 mg Q24.   Resume benazepril 20 mg Q24.    7. Hypercholesterolemia              1992: Diagnosed.              On atorvastatin 20 mg Q24.              Tells me well controlled.     8. Diabetes Mellitus, Type 2              1992: Diagnosed. Complications: CKD2. Medications: Oral agent.              On metformin and Trajenta.              Been on aspirin 81 mg Q24.   No aspirin as anticoagulated.              Well controlled.     9. Chronic Kidney Disease, Stage 2              3/3/2015: BUN/crea 15/1.0. CrCl 65.              Stable.     10. Rheumatoid Arthritis.              2006: Diagnosed. Involvement: Hands, knees and back.              On methotrexate and folic acid.              Dr. Ezequiel Figueroa Jr..     11. Primary Care              Dr. Mauricio Sood.      VTE Risk Mitigation (From admission, onward)        Ordered     apixaban tablet 5 mg  2 times daily      09/11/19 1934     Place sequential compression device  Until discontinued      09/11/19 1644     IP VTE HIGH RISK PATIENT  Once      09/11/19 1644     Reason for No Pharmacological VTE Prophylaxis  Once      09/11/19 1644          Garrett Calderon MD  Cardiology  [facility]

## 2019-09-14 NOTE — SUBJECTIVE & OBJECTIVE
Interval History: still c/o some shortness of breath but better than before.    Review of Systems   Constitutional: Negative for chills and fever.   HENT: Negative for sinus pain and trouble swallowing.    Respiratory: Positive for shortness of breath. Negative for cough.    Cardiovascular: Negative for chest pain and leg swelling.   Gastrointestinal: Negative for nausea and vomiting.   Genitourinary: Negative for dysuria and hematuria.   Musculoskeletal: Negative for gait problem.   Skin: Negative for rash.   Neurological: Positive for weakness. Negative for headaches.   Psychiatric/Behavioral: Negative for confusion.     Objective:     Vital Signs (Most Recent):  Temp: 97.6 °F (36.4 °C) (09/14/19 1218)  Pulse: 67 (09/14/19 1400)  Resp: 18 (09/14/19 1218)  BP: (!) 140/68 (09/14/19 1218)  SpO2: 100 % (09/14/19 1218) Vital Signs (24h Range):  Temp:  [97.6 °F (36.4 °C)-98.3 °F (36.8 °C)] 97.6 °F (36.4 °C)  Pulse:  [] 67  Resp:  [16-22] 18  SpO2:  [91 %-100 %] 100 %  BP: (107-182)/() 140/68     Weight: 61.4 kg (135 lb 5.8 oz)  Body mass index is 23.98 kg/m².  No intake or output data in the 24 hours ending 09/14/19 1549   Physical Exam   Constitutional: She is oriented to person, place, and time. No distress.   HENT:   Head: Normocephalic and atraumatic.   Eyes: Pupils are equal, round, and reactive to light. EOM are normal.   Neck: Normal range of motion. Neck supple.   Cardiovascular: Normal rate and regular rhythm.   Pulmonary/Chest: Effort normal and breath sounds normal. No respiratory distress.   Abdominal: Soft. Bowel sounds are normal. She exhibits no distension. There is no tenderness.   Musculoskeletal: Normal range of motion. She exhibits no edema.   Neurological: She is alert and oriented to person, place, and time. No cranial nerve deficit.   Skin: Skin is warm.   Psychiatric: She has a normal mood and affect.   Vitals reviewed.      Significant Labs:   CMP:   Recent Labs   Lab 09/13/19  9898  09/14/19  0528    139   K 4.1 3.6    102   CO2 27 28   * 228*   BUN 28* 28*   CREATININE 1.3 1.4   CALCIUM 9.3 9.1   PROT 6.8 6.4   ALBUMIN 3.4* 3.2*   BILITOT 1.1* 1.3*   ALKPHOS 94 78   AST 40 33   ALT 69* 52*   ANIONGAP 9 9   EGFRNONAA 39* 36*       Significant Imaging: I have reviewed all pertinent imaging results/findings within the past 24 hours.

## 2019-09-14 NOTE — ASSESSMENT & PLAN NOTE
- CKD II diagnosed some years ago.  Creatinine now 1.3 and has been improving.  - She admits to occasionally taking Aleve and she is on benazopril  and diuretics.  Might have mild ATN.  Would expect this to resolve.  - ace restarted.  She has had some improvement since admission.  Monitor closely.

## 2019-09-14 NOTE — PLAN OF CARE
Problem: Adult Inpatient Plan of Care  Goal: Plan of Care Review  Outcome: Ongoing (interventions implemented as appropriate)  POC reviewed with patient. VS stable on room air. No complaints of pain throughout shift. No difficulty voiding; up to toilet. Positions self independently. Instructed to call for help ambulating. No injuries, falls, or trauma occurred during shift. Purposeful rounding completed. Patient taken for cardioversion procedure today, has not yet returned to floor. Prior to procedure telemetry monitor read atrial fibrillation with tachycardia. IV flushed and saline locked. Gave handoff report to Trish Pal RN. Denies any questions regarding patient.

## 2019-09-15 NOTE — PROGRESS NOTES
Nose bleed had stopped and restarted a small amount and Dr. Floyd and Dr. Calderon notified.  Will continue to monitor.

## 2019-09-15 NOTE — PLAN OF CARE
Problem: Adult Inpatient Plan of Care  Goal: Plan of Care Review  Outcome: Ongoing (interventions implemented as appropriate)  Pt on room air, SpO2 99%. No respiratory distress noted.

## 2019-09-15 NOTE — PLAN OF CARE
Problem: Adult Inpatient Plan of Care  Goal: Plan of Care Review  POC reviewed. No significant events this shift. Cardiac monitor maintained. VSS on RA. No complaints of pain. Pt voids and shifts in bed independently. Bed lowered and locked, side rails up x3, call light within reach. Will continue to monitor.

## 2019-09-15 NOTE — PROGRESS NOTES
[facility]  Cardiology  Progress Note    Patient Name: Sandra Oseguera  MRN: [id number]  Admission Date: 9/11/2019  Hospital Length of Stay: 4 days  Code Status: Full Code   Attending Physician: Julia Floyd MD   Primary Care Physician: Mauricio Sood MD  Expected Discharge Date:   Principal Problem:Heart failure, diastolic, acute on chronic    Subjective:     Brief HPI:    Sandra Oseguera is a 79 y.o.female with hypertension, hypercholesterolemia and diabetes mellitus type 2. She has rheumatoid arthritis involving knees, hands and back. In the summer of 2013 she developed progressive exertional dyspnea occasionally associated with mild chest pressure. She had an Echocardiogram that revealed findings consistent with hypertensive heart disease with a high LVEDP. She had low exercise tolerance on a Stress MPI but no defects. She was began on furosemide and after that she was breathing better. She had no exertional chest pain but mild to moderate exertional dyspnea if walking fast but no wheezing. Her heart rate was 70-80 bpm at rest and no longer raced with activities. As it appeared her exertional dyspnea was out of proportion to her cardiac findings she was referrred for PFTs that were done on 1/13/2017. They revealed small airway obstruction without significant response to inhalers. She saw Dr. Jarek Wade and was prescribed Anoro inhalers and a rescue inhaler. She also got enrolled in pulmonary rehabilitation and did 25 sessions. She was breathing better and thought her legs holds her back more than the breathing.      In late 8/2019 she noted palpitations and then slowly got more and more short of breath. She was seen by Dr. Mauricio Sood on 9/11/2019 and noted to be in atrial fibrillation with moderately fast VRR. She was admitted.    Hospital Course:     Diuresis.    Anticoagulation.    Rate control with metoprolol.    9/12/2019: Echo: Normal left ventricular size with mild  systolic dysfunction. EF 45%. Moderate LVH. Moderately dilated LA. Moderate MR. Severe TR. SPAP 67 mmHg.     9/13/2019: [facility]: CRISTELA & CV: DONATO: Mild spontaneous echocardiographic contrast. Moderate MR.  J to sinus rhythm.     Interval History:     Weak. Breathing easier. No CP. Remains in sinus rhythm. Had mild nose bleed.    Review of Systems   Constitution: Negative for chills, fever and malaise/fatigue.   HENT: Negative for nosebleeds.    Eyes: Negative for vision loss in left eye and vision loss in right eye.   Cardiovascular: Negative for chest pain, dyspnea on exertion, leg swelling, orthopnea, palpitations and paroxysmal nocturnal dyspnea.   Respiratory: Negative for cough, hemoptysis, shortness of breath, sputum production and wheezing.    Hematologic/Lymphatic: Negative for bleeding problem.   Skin: Negative for rash.   Musculoskeletal: Negative for myalgias.   Gastrointestinal: Negative for abdominal pain, heartburn, hematemesis, hematochezia, jaundice, melena, nausea and vomiting.   Genitourinary: Negative for hematuria.   Neurological: Negative for dizziness, headaches, light-headedness, vertigo and weakness.   Psychiatric/Behavioral: Negative for altered mental status. The patient is not nervous/anxious.    Allergic/Immunologic: Negative for persistent infections.     Objective:     Vital Signs (Most Recent):  Temp: 97.8 °F (36.6 °C) (09/15/19 1100)  Pulse: 72 (09/15/19 1200)  Resp: 18 (09/15/19 1100)  BP: (!) 151/67 (09/15/19 1100)  SpO2: 100 % (09/15/19 1100) Vital Signs (24h Range):  Temp:  [97.7 °F (36.5 °C)-98.3 °F (36.8 °C)] 97.8 °F (36.6 °C)  Pulse:  [60-72] 72  Resp:  [16-18] 18  SpO2:  [96 %-100 %] 100 %  BP: (111-151)/(58-67) 151/67     Weight: 61.4 kg (135 lb 5.8 oz)  Body mass index is 23.98 kg/m².    SpO2: 100 %  O2 Device (Oxygen Therapy): room air      Intake/Output Summary (Last 24 hours) at 9/15/2019 1403  Last data filed at 9/15/2019 0900  Gross per 24 hour   Intake --   Output 801  ml   Net -801 ml       Lines/Drains/Airways     Peripheral Intravenous Line                 Peripheral IV - Single Lumen 09/11/19 1221 18 G Left Antecubital 4 days                Physical Exam   Constitutional: She is oriented to person, place, and time. She appears well-developed and well-nourished.  Non-toxic appearance. She does not appear ill. No distress.   Eyes: Right conjunctiva is not injected. Right conjunctiva has no hemorrhage. Left conjunctiva is not injected. Left conjunctiva has no hemorrhage.   Neck: No JVD present.   Cardiovascular: S1 normal and S2 normal. An irregularly irregular rhythm present. Tachycardia present. Exam reveals no gallop.   Murmur heard.  High-pitched blowing holosystolic murmur is present with a grade of 2/6 at the apex.  Pulmonary/Chest: Effort normal and breath sounds normal.   Abdominal: Normal appearance. There is no tenderness.   Musculoskeletal:        Right ankle: She exhibits no swelling.        Left ankle: She exhibits no swelling.   Neurological: She is alert and oriented to person, place, and time. She is not disoriented.   Skin: Skin is warm and dry. No rash noted.   Psychiatric: She has a normal mood and affect. Her speech is normal and behavior is normal. Cognition and memory are normal.       Current Medications:     apixaban  5 mg Oral BID    benazepril  20 mg Oral Daily    furosemide  40 mg Oral Daily    insulin detemir U-100  12 Units Subcutaneous QHS    magnesium oxide  400 mg Oral BID    metoprolol succinate  200 mg Oral Daily     Current Laboratory Results:    Recent Results (from the past 24 hour(s))   POCT glucose    Collection Time: 09/14/19  3:25 PM   Result Value Ref Range    POCT Glucose 145 (H) 70 - 110 mg/dL   POCT glucose    Collection Time: 09/14/19  9:22 PM   Result Value Ref Range    POCT Glucose 142 (H) 70 - 110 mg/dL   Comprehensive metabolic panel    Collection Time: 09/15/19  5:08 AM   Result Value Ref Range    Sodium 140 136 - 145  mmol/L    Potassium 3.7 3.5 - 5.1 mmol/L    Chloride 102 95 - 110 mmol/L    CO2 27 23 - 29 mmol/L    Glucose 114 (H) 70 - 110 mg/dL    BUN, Bld 29 (H) 8 - 23 mg/dL    Creatinine 1.4 0.5 - 1.4 mg/dL    Calcium 9.3 8.7 - 10.5 mg/dL    Total Protein 6.6 6.0 - 8.4 g/dL    Albumin 3.3 (L) 3.5 - 5.2 g/dL    Total Bilirubin 1.0 0.1 - 1.0 mg/dL    Alkaline Phosphatase 81 55 - 135 U/L    AST 33 10 - 40 U/L    ALT 44 10 - 44 U/L    Anion Gap 11 8 - 16 mmol/L    eGFR if African American 42 (A) >60 mL/min/1.73 m^2    eGFR if non African American 36 (A) >60 mL/min/1.73 m^2   CBC auto differential    Collection Time: 09/15/19  5:08 AM   Result Value Ref Range    WBC 10.82 3.90 - 12.70 K/uL    RBC 3.24 (L) 4.00 - 5.40 M/uL    Hemoglobin 10.2 (L) 12.0 - 16.0 g/dL    Hematocrit 31.3 (L) 37.0 - 48.5 %    Mean Corpuscular Volume 97 82 - 98 fL    Mean Corpuscular Hemoglobin 31.5 (H) 27.0 - 31.0 pg    Mean Corpuscular Hemoglobin Conc 32.6 32.0 - 36.0 g/dL    RDW 16.9 (H) 11.5 - 14.5 %    Platelets 149 (L) 150 - 350 K/uL    MPV 13.3 (H) 9.2 - 12.9 fL    Immature Granulocytes 0.6 (H) 0.0 - 0.5 %    Gran # (ANC) 7.0 1.8 - 7.7 K/uL    Immature Grans (Abs) 0.07 (H) 0.00 - 0.04 K/uL    Lymph # 2.4 1.0 - 4.8 K/uL    Mono # 1.0 0.3 - 1.0 K/uL    Eos # 0.4 0.0 - 0.5 K/uL    Baso # 0.05 0.00 - 0.20 K/uL    nRBC 0 0 /100 WBC    Gran% 64.4 38.0 - 73.0 %    Lymph% 21.8 18.0 - 48.0 %    Mono% 9.2 4.0 - 15.0 %    Eosinophil% 3.5 0.0 - 8.0 %    Basophil% 0.5 0.0 - 1.9 %    Differential Method Automated    Magnesium    Collection Time: 09/15/19  5:08 AM   Result Value Ref Range    Magnesium 1.5 (L) 1.6 - 2.6 mg/dL   POCT glucose    Collection Time: 09/15/19  7:13 AM   Result Value Ref Range    POCT Glucose 113 (H) 70 - 110 mg/dL   POCT glucose    Collection Time: 09/15/19 11:59 AM   Result Value Ref Range    POCT Glucose 174 (H) 70 - 110 mg/dL     9/12/2019: Echo:    Moderate concentric left ventricular hypertrophy.  Mildly decreased left ventricular  systolic function. The estimated ejection fraction is 45%  Mild global hypokinetic wall motion.  Atrial fibrillation observed.  Normal right ventricular systolic function.  Moderate left atrial enlargement.  Moderate right atrial enlargement.  Moderate mitral regurgitation.  Severe tricuspid regurgitation.  Moderate pulmonary hypertension present.  Intermediate central venous pressure (8 mm Hg).  The estimated PA systolic pressure is 67 mm Hg    Current Imaging Results:    X-Ray Chest AP Portable   Final Result      As above         Electronically signed by: Lula Lou MD   Date:    09/11/2019   Time:    13:11          Assessment and Plan:     Problem List:    Active Diagnoses:    Diagnosis Date Noted POA    PRINCIPAL PROBLEM:  Heart failure, diastolic, acute on chronic [I50.33] 03/03/2014 Yes    Atrial fibrillation [I48.91] 09/11/2019 Yes    Chronic anticoagulation [Z79.01] 09/13/2019 Not Applicable    Right bundle branch block [I45.10] 09/12/2018 Yes    Shortness of breath [R06.02] 02/03/2014 Yes    Hypertension [I10] 02/03/2014 Yes    Hypercholesterolemia [E78.00] 12/01/2014 Yes    Type 2 diabetes mellitus with stage 2 chronic kidney disease, with long-term current use of insulin [E11.22, N18.2, Z79.4] 02/03/2014 Not Applicable    Chronic kidney disease, stage II (mild) [N18.2] 02/03/2014 Yes    Rheumatoid arthritis [M06.9] 12/01/2014 Yes    Abnormal liver enzymes [R74.8] 09/11/2019 Yes      Problems Resolved During this Admission:     Assessment and Plan:     1. Heart Failure, Diastolic, Acute on Chronic              1/10/2014: CXR: WNL.              2/13/2014: Echo: Normal LV size and function. Mild LVH. Moderate diastolic dysfunction. Elevated LVEDP. Mildly dilated LA.              2/13/2014: Stress MPI: 3:30 min. No CP. Low exercise tolerance. ECG negative. MPI negative.              Suspect exertional dyspnea related to diastolic dysfunction and high LVEDP as well as her pulmonary disease.               Significantly less short of breath since on furosemide 20 mg Q24.              As heart rate appeared to go up with exertion to a level where she felt palpitations increased dose of metoprolol to 100 mg Q24.              9/11/2019: Presents in HF probably due to atrial fibrillation.   9/12/2019: Echo: Normal left ventricular size with mild systolic dysfunction. EF 45%. Moderate LVH. Moderately dilated LA. Moderate MR. Severe TR. SPAP 67 mmHg.    On benazepril 20 mg Q24 and furosemide 40 mg Q24.   Well compensated.     2. Atrial Fibrillation              8/2019: Suspect onset of persistent atrial fibrillation with moderately fast VRR.              [id number] ([id number]).              On metoprolol 100 mg - increased to 200 mg Q24.              Anticoagulation with apixiban 5 mg Q12.   9/13/2019: [facility]: CRISTELA & CV: DONATO: Mild spontaneous echocardiographic contrast. Moderate MR.  J to sinus rhythm.    On metoprolol 200 mg Q24.     3. Chronic Anticoagulation              8/2019: Suspect onset of persistent atrial fibrillation with moderately fast VRR.              [id number] ([id number]).              On apixiban 5 mg Q12.     4. Right Bundle Branch Block              2018: Diagnosed with RBBB.     5. Shortness of Breath              8/2013: Began experience exertional dyspnea.              1/13/2017: PFTs: Small airway obstruction without significant response to inhalers.              On Anoro inhaler and Ventoline and was in pulmonary rehabilitation program.              Stable.              Dr. Jarek Wade.     6. Hypertension              1992: Diagnosed.              At home been on metoprolol 100 mg Q24, benazepril 20 mg Q24 and furosemide 20 mg Q24.              Keeping log at home.              Overall well controlled.   On metoprolol 200 mg Q24, benazepril 20 mg Q24 and furosemide 40 mg Q24.     7. Hypercholesterolemia              1992: Diagnosed.              On atorvastatin 20 mg Q24.               Tells me well controlled.     8. Diabetes Mellitus, Type 2              1992: Diagnosed. Complications: CKD2. Medications: Oral agent.              On metformin and Trajenta.              Been on aspirin 81 mg Q24.   No aspirin as anticoagulated.              Well controlled.     9. Chronic Kidney Disease, Stage 2              3/3/2015: BUN/crea 15/1.0. CrCl 65.              Stable.     10. Rheumatoid Arthritis.              2006: Diagnosed. Involvement: Hands, knees and back.              On methotrexate and folic acid.              Dr. Ezequiel Figueroa Jr..     11. Primary Care              Dr. Mauricio Sood.      VTE Risk Mitigation (From admission, onward)        Ordered     apixaban tablet 5 mg  2 times daily      09/11/19 1934     Place sequential compression device  Until discontinued      09/11/19 1644     IP VTE HIGH RISK PATIENT  Once      09/11/19 1644     Reason for No Pharmacological VTE Prophylaxis  Once      09/11/19 1644          Garrett Calderon MD  Cardiology  [facility]

## 2019-09-15 NOTE — NURSING
Discharge instructions given and pxs sent to pharmacy and verbalizes understanding.  Hep loc out and catheter intact.  No distress noted and ready for discharge.  Waiting for family to pick her up.

## 2019-09-17 NOTE — PLAN OF CARE
09/17/19 1042   Final Note   Assessment Type Final Discharge Note   Anticipated Discharge Disposition Home   Hospital Follow Up  Appt(s) scheduled? Yes   Discharge plans and expectations educations in teach back method with documentation complete? Yes   Right Care Referral Info   Post Acute Recommendation No Care

## 2019-09-17 NOTE — PATIENT INSTRUCTIONS
"Discharge Instructions for Heart Failure  You have been diagnosed with heart failure. The term "heart failure" sounds scary as it suggests the heart has stopped working. But it actually means the heart is not doing its job as well as it should. Heart failure happens when your heart muscle cannot keep up with your body's need for blood flow. Symptoms of heart failure can be controlled by changes in your lifestyle and by following your doctor's advice.   Home Care  Activity   Ask your doctor about an exercise program. You can benefit from simple activities such as walking or gardening. Exercising most days of the week can make you feel better. Don't be discouraged if your progress is slow at first. Rest as needed and stop activity if you develop symptoms such as chest pain, lightheadedness, or significant shortness of breath. Your doctor may prescribe a cardiac rehabilitation program. This is a program to help recover from heart disease through professional lifestyle counseling and education and medically supervised physical activity.   Diet   Follow a heart healthy diet and work hard to remove salt from your diet. Try to limit total salt intake to 2000 mg a day or less. Salt causes your body to retain water, which can make it harder for your heart to pump. You can limit salt by doing the following:  Limit canned, dried, packaged, and fast foods.  Don't add salt to your food at the table.  Season foods with herbs instead of salt when you cook.  Monitor your fluid intake. Drinking too much fluid can make heart failure worse. It is commonly advised to limit total fluid intake to less than 66 ounces (2 liters) a day.  Limit alcohol. Too much alcohol can be harmful to the heart. Alcohol should be limited to no more than one serving a day for women and two servings a day for men.  Tobacco   Break the smoking habit. Smoking increases your chance of having a heart attack, which will worsen heart failure. Quitting smoking is " the number one thing you can do to improve your health. Enroll in a stop smoking program and ask your doctor about medications or nicotine replacement therapy. These methods improve your chances of success.  Medications   Take your medications exactly as prescribed. Learn the names and purpose of each of your medications. Keep an accurate medication list with you at all times including current dosages. Don't skip doses. If you miss a dose of your medication, take it as soon as you remember, unless it's almost time for your next dose. In that case, just wait and take your next dose at the normal time. Don't take a double dose. If you are unsure, contact your doctor or pharmacist.  Weight monitoring   Weigh yourself every day. Do this at the same time of day and in the same kind of clothes. Ideally, weigh yourself first thing every morning after you empty your bladder, but before you eat breakfast. Record your weight and take a record of it to each of your doctor's visit. If your weight increases by 3 pounds in one day or 5 pounds in one week, you should contact your doctor. This is a sign that you are retaining more fluid than you should be, which can worsen heart failure.  Symptoms   Heart failure can cause a variety of symptoms including the following:  Shortness of breath  Difficulty breathing at night  Swelling in the legs and feet or in the abdomen  Becoming easily fatigued  Irregular or rapid heartbeat  Weakness or lightheadedness  It is important to know what to do if you develop signs of worsening heart failure.  Follow-Up  Make a follow-up appointment as directed by our staff. They will provide specific instruction for timing of appointments. Depending on the type and severity of heart failure you have, you may require follow up as early as 7 days from hospital discharge. Keep appointments for checkups and lab tests that are needed to check your medications and condition.  .    When to Call Your Doctor  Call  your doctor right away if you have any of the following signs of worsening heart failure:  Sudden weight gain (3 or more pounds in one day or 5 or more pounds in one week)  Trouble breathing not related to being active  New or increased swelling of your legs or ankles  Swelling or pain in your abdomen  Breathing trouble at night (waking up short of breath, needing more pillows to breathe)  Frequent coughing that doesnt go away  Feeling much more tired than usual  When to Seek Emergency Medical Attention   Call 911 right away if you develop:  Severe shortness of breath, such that you cannot catch your breath, even resting  Severe chest pain that does not resolve with rest or nitroglycerin  Pink, foamy mucus with cough and shortness of breath  A continuous rapid or irregular heartbeat  Passing out or fainting  Acute stroke symptoms such as sudden numbness or weakness on one side of your face, arm, or leg, or sudden confusion, trouble speaking or vision changes.   © 6434-7069 [employer], [address] [place] [address], [place] [address]. All rights reserved. This information is not intended as a substitute for professional medical care. Always follow your healthcare professional's instructions.

## 2019-09-29 NOTE — ASSESSMENT & PLAN NOTE
- BNP elevated and CXR shows possible effusions.  She has lower extremity edema.    - In addition has severe TR with pulmonary HTN.  - diuresed with daily iv lasix, switched to oral on dc  - Has elevated transaminases but normal alkaline phosphatase/albumin.  Might be hepatic congestion.

## 2019-09-29 NOTE — DISCHARGE SUMMARY
"[facility] Hospital Medicine  Discharge Summary      Patient Name: Sandra Oseguera  MRN: [id number]  Admission Date: 9/11/2019  Hospital Length of Stay: 4 days  Discharge Date and Time: 9/15/2019  5:12 PM  Attending Physician: No att. providers found   Discharging Provider: Julia Floyd MD  Primary Care Provider: Mauricio Sood MD      HPI:   Ms. Oseguera is a 79 year old woman who presented to her PCP's office for a routine visit today, was found to be in rapid atrial fibrillation and was sent to the hospital.  Patient reports she has had poor exercise tolerance for longer than a year, with shortness of breath and dyspnea with minimal activity.  This is associated with a tight feeling in her chest and discomfort in her thighs and ankles.  She has had swelling of her lower extremities that improves when she puts up her feet.  On arrival she was in atrial fibrillation with .  Labs were notable for mild anemia at baseline, BUN/creatinine 25/1.6, AST//117, troponin 0.093, .  CXR showed possible small effusions vs atelectasis.  She is being admitted for further workup and treatment.    Medical history includes HTN and chronic diastolic heart failure with most recent EF 76% and diastolic dysfunction in 2017.  There was also mild to moderate mitral regurgitation.  She has rheumatoid arthritis and is on MTX 10 mg every Monday.  She also takes Aleve once in awhile although says "I know I'm not supposed to."  She had a stress echo some time ago which was notable for poor exercise tolerance.  She has type II diabetes and is on Tradjenta and Basaglar insulin.  She is followed by Dr. Jarek Wade for small airways disease for which she is on Anoro Ellipta and a rescue inhaler.  She uses the inhaler frequently but says it doesn't relieve the shortness of breath.  She has been told she has stage II CKD.  She has never been a smoker.    Procedure(s) " "(LRB):  ECHOCARDIOGRAM,TRANSESOPHAGEAL (N/A)  Cardioversion or Defibrillation      Hospital Course:   Patient was admitted and was seen by cardiology.  She was continued on double the dose of her beta blocker and was started on apixaban.  She had  cardioversion on 9/13, and  she converted to sinus rhythm.She did feel better with shortness of breath.Had some nose bleeding which stopped with pressure , was advised to keep nares moist and avoid picking and if had bleeding to apply pressure and given afrin spray prescription.     Consults:   Consults (From admission, onward)        Status Ordering Provider     Inpatient consult to Cardiology  Once     Provider:  Garrett Calderon MD    Completed MELBA GUPTA          * Heart failure, diastolic, acute on chronic  - BNP elevated and CXR shows possible effusions.  She has lower extremity edema.    - In addition has severe TR with pulmonary HTN.  - diuresed with daily iv lasix, switched to oral on dc  - Has elevated transaminases but normal alkaline phosphatase/albumin.  Might be hepatic congestion.      Abnormal liver enzymes  - As above under "heart failure"  - improving  - ok to restart lipitor on dc      Type 2 diabetes mellitus with stage 2 chronic kidney disease, with long-term current use of insulin  - Patient reports well controlled on Tradjenta and Basaglar 10 units daily and 15 units every 5th day.  - Low dose SSI for now.  Started long-acting insulin 10 units nightly, increased to 12 units .  HbA1c 8.3        Final Active Diagnoses:    Diagnosis Date Noted POA    PRINCIPAL PROBLEM:  Heart failure, diastolic, acute on chronic [I50.33] 03/03/2014 Yes    Chronic anticoagulation [Z79.01] 09/13/2019 Not Applicable    Atrial fibrillation [I48.91] 09/11/2019 Yes    Abnormal liver enzymes [R74.8] 09/11/2019 Yes    Right bundle branch block [I45.10] 09/12/2018 Yes    Rheumatoid arthritis [M06.9] 12/01/2014 Yes    Hypercholesterolemia [E78.00] 12/01/2014 Yes    " Chronic kidney disease, stage II (mild) [N18.2] 02/03/2014 Yes    Type 2 diabetes mellitus with stage 2 chronic kidney disease, with long-term current use of insulin [E11.22, N18.2, Z79.4] 02/03/2014 Not Applicable    Shortness of breath [R06.02] 02/03/2014 Yes    Hypertension [I10] 02/03/2014 Yes      Problems Resolved During this Admission:       Discharged Condition: stable    Disposition: Home or Self Care    Follow Up:  Follow-up Information     Mauricio Sood MD In 2 weeks.    Specialty:  Internal Medicine  Contact information:  [address] [place] [address] [id number] [phone]             Garrett Calderon MD In 2 weeks.    Specialty:  Cardiology  Contact information:  [address] [place], [place] [id number] [phone]                 Patient Instructions:      Diet diabetic     Diet Cardiac     Diet Cardiac     Diet diabetic     Activity as tolerated     Activity as tolerated       Significant Diagnostic Studies:  Lab Results   Component Value Date    WBC 10.82 09/15/2019    HGB 10.2 (L) 09/15/2019    HCT 31.3 (L) 09/15/2019    MCV 97 09/15/2019     (L) 09/15/2019     BMP  Lab Results   Component Value Date     09/15/2019    K 3.7 09/15/2019     09/15/2019    CO2 27 09/15/2019    BUN 29 (H) 09/15/2019    CREATININE 1.4 09/15/2019    CALCIUM 9.3 09/15/2019    ANIONGAP 11 09/15/2019    ESTGFRAFRICA 42 (A) 09/15/2019    EGFRNONAA 36 (A) 09/15/2019         Pending Diagnostic Studies:     None         Medications:  Reconciled Home Medications:      Medication List      START taking these medications    acetaminophen 325 MG tablet  Commonly known as:  TYLENOL  Take 2 tablets (650 mg total) by mouth every 4 (four) hours as needed.     apixaban 5 mg Tab  Commonly known as:  ELIQUIS  Take 1 tablet (5 mg total) by mouth 2 (two) times daily.     oxymetazoline 0.05 % nasal spray  Commonly known as:  AFRIN (OXYMETAZOLINE)  2 sprays by Nasal route 2 (two) times daily as needed (bleeding).         CHANGE how you take these medications    insulin glargine 100 units/mL (3mL) SubQ pen  Commonly known as:  BASAGLAR KWIKPEN U-100 INSULIN  Inject 12 Units into the skin once daily. Take 15 units every 5th day.  What changed:  how much to take        CONTINUE taking these medications    ANORO ELLIPTA INHL  Inhale 1 puff into the lungs once daily.     atorvastatin 20 MG tablet  Commonly known as:  LIPITOR  Take 1 tablet (20 mg total) by mouth once daily.     benazepril 20 MG tablet  Commonly known as:  LOTENSIN  Take 1 tablet (20 mg total) by mouth once daily.     fish oil-omega-3 fatty acids 300-1,000 mg capsule  Take 1 capsule by mouth once daily.     folic acid 1 MG tablet  Commonly known as:  FOLVITE  Take 1 tablet by mouth everyday     gabapentin 300 MG capsule  Commonly known as:  NEURONTIN  Take 1 capsule by mouth every morning and at lunch, then 2 capsules every evening     methotrexate 2.5 MG Tab  Take 10 mg by mouth every Monday.     TRADJENTA 5 mg Tab tablet  Generic drug:  linaGLIPtin  Take 5 mg by mouth once daily.     VENTOLIN HFA 90 mcg/actuation inhaler  Generic drug:  albuterol  Inhale 2 puffs into the lungs 2 (two) times daily as needed for Wheezing or Shortness of Breath.     vitamin D 1000 units Tab  Commonly known as:  VITAMIN D3  Take 1,000 Units by mouth once daily.        STOP taking these medications    ALEVE ORAL     aspirin 81 MG EC tablet  Commonly known as:  ECOTRIN        ASK your doctor about these medications    furosemide 40 MG tablet  Commonly known as:  LASIX  TAKE 1 TABLET(40 MG) BY MOUTH EVERY DAY  Ask about: Which instructions should I use?     magnesium oxide 400 mg (241.3 mg magnesium) tablet  Commonly known as:  MAG-OX  Take 1 tablet (400 mg total) by mouth 2 (two) times daily. for 5 days  Ask about: Should I take this medication?     metoprolol succinate 200 MG 24 hr tablet  Commonly known as:  TOPROL-XL  TAKE 1 TABLET(200 MG) BY MOUTH EVERY DAY  Ask about: Which  instructions should I use?            Indwelling Lines/Drains at time of discharge:   Lines/Drains/Airways     None                 Time spent on the discharge of patient: 40  minutes  Patient was seen and examined on the date of discharge and determined to be suitable for discharge.         Julia Floyd MD  Department of Hospital Medicine  [facility]

## 2019-09-29 NOTE — ASSESSMENT & PLAN NOTE
- Patient reports well controlled on Tradjenta and Basaglar 10 units daily and 15 units every 5th day.  - Low dose SSI for now.  Started long-acting insulin 10 units nightly, increased to 12 units .  HbA1c 8.3

## 2019-10-29 NOTE — PROGRESS NOTES
Subjective:     Sandra Oseguera is a 79 y.o. female with hypertension, hypercholesterolemia and diabetes mellitus type 2. She has rheumatoid arthritis involving knees, hands and back. In the summer of 2013 she developed progressive exertional dyspnea occasionally associated with mild chest pressure. She had an Echocardiogram that revealed findings consistent with hypertensive heart disease with a high LVEDP. She had low exercise tolerance on a Stress MPI but no defects. She was began on furosemide and after that she was breathing better. She had no exertional chest pain but mild to moderate exertional dyspnea if walking fast but no wheezing. Her heart rate was 70-80 bpm at rest and no longer raced with activities. As it appeared her exertional dyspnea was out of proportion to her cardiac findings she was referrred for PFTs that were done on 1/13/2017. They revealed small airway obstruction without significant response to inhalers. She saw Dr. Jarek Wade and was prescribed Anoro inhalers and a rescue inhaler. She also got enrolled in pulmonary rehabilitation and did 25 sessions. She was breathing better and thought her legs held her back more than the breathing. In late 8/2019 she noted palpitations and then slowly got more and more short of breath. She was seen by Dr. Mauricio Sood on 9/11/2019 and noted to be in atrial fibrillation with moderately fast VRR. She was admitted. She was diuresed  And anticoagulated. Rate was controlled with metoprolol. On 9/12/2019 she had an Echo that revealed normal left ventricular size with mild systolic dysfunction. The EF was 45%. There was moderate LVH and the LA was moderately dilated. There was moderate MR and severe TR with the SPAP being elevated to 67 mmHg. On 9/13/2019 she underwent a CRISTELA guided CV. The DONATO had mild spontaneous echocardiographic contrast and there was moderate MR. She was converted to sinus with a 100 J shock. She was feeling better in sinus  rhythm. No palpitations or weak spells. Feeling well overall.      Congestive Heart Failure   Presents for follow-up visit. The disease course has been stable. Associated symptoms include shortness of breath. Pertinent negatives include no abdominal pain, chest pain, chest pressure, claudication, edema, fatigue, muscle weakness, near-syncope, nocturia, orthopnea, palpitations, paroxysmal nocturnal dyspnea or unexpected weight change. The symptoms have been stable.   Shortness of Breath   This is a chronic problem. The current episode started more than 1 year ago. The problem occurs daily. Pertinent negatives include no abdominal pain, chest pain, claudication, coryza, ear pain, fever, headaches, hemoptysis, leg pain (entire legs), leg swelling, neck pain, orthopnea, PND, rash, rhinorrhea, sore throat, sputum production, swollen glands, syncope, vomiting or wheezing.   Hypertension   This is a chronic problem. The current episode started more than 1 year ago. The problem is unchanged. The problem is controlled (usually 120-130/60-70 mmHg at home). Associated symptoms include shortness of breath. Pertinent negatives include no anxiety, blurred vision, chest pain, headaches, malaise/fatigue, neck pain, orthopnea, palpitations, peripheral edema, PND or sweats. Identifiable causes of hypertension include chronic renal disease.   Hyperlipidemia   This is a chronic problem. The current episode started more than 1 year ago. The problem is controlled. Recent lipid tests were reviewed and are normal. Exacerbating diseases include chronic renal disease and diabetes. She has no history of hypothyroidism, liver disease, obesity or nephrotic syndrome. Associated symptoms include shortness of breath. Pertinent negatives include no chest pain, focal sensory loss, focal weakness, leg pain (entire legs) or myalgias.   Atrial Fibrillation   Presents for follow-up visit. Symptoms include hypertension and shortness of breath. Symptoms  are negative for bradycardia, chest pain, dizziness, hypotension, palpitations, syncope and weakness. The symptoms have been stable. Past medical history includes atrial fibrillation, CHF and hyperlipidemia.       Review of Systems   Constitution: Negative for fatigue, fever, malaise/fatigue, unexpected weight change, weight gain and weight loss.   HENT: Negative for ear pain, nosebleeds, rhinorrhea and sore throat.    Eyes: Negative for blurred vision, pain and redness.   Cardiovascular: Positive for dyspnea on exertion. Negative for chest pain, claudication, irregular heartbeat, leg swelling, near-syncope, orthopnea, palpitations, paroxysmal nocturnal dyspnea and syncope.   Respiratory: Positive for shortness of breath. Negative for cough, hemoptysis, sputum production and wheezing.    Endocrine: Negative for cold intolerance and heat intolerance.   Hematologic/Lymphatic: Negative for bleeding problem. Does not bruise/bleed easily.   Skin: Negative for color change and rash.   Musculoskeletal: Positive for arthritis, back pain and joint pain. Negative for falls (8/2016: Fell going up steps), muscle weakness, myalgias and neck pain.   Gastrointestinal: Negative for abdominal pain, heartburn, hematemesis, hematochezia, hemorrhoids, jaundice, melena, nausea and vomiting.   Genitourinary: Negative for dysuria, hematuria, menorrhagia and nocturia.   Neurological: Positive for numbness and paresthesias. Negative for difficulty with concentration, dizziness, focal weakness, headaches, light-headedness, loss of balance, tremors, vertigo and weakness.   Psychiatric/Behavioral: Negative for altered mental status, depression and memory loss. The patient is not nervous/anxious.    Allergic/Immunologic: Negative for hives and persistent infections.       Current Outpatient Medications on File Prior to Visit   Medication Sig Dispense Refill    acetaminophen (TYLENOL) 325 MG tablet Take 2 tablets (650 mg total) by mouth every 4  "(four) hours as needed.  0    apixaban (ELIQUIS) 5 mg Tab Take 1 tablet (5 mg total) by mouth 2 (two) times daily. 60 tablet 1    atorvastatin (LIPITOR) 20 MG tablet Take 1 tablet (20 mg total) by mouth once daily. 90 tablet 3    benazepril (LOTENSIN) 20 MG tablet Take 1 tablet (20 mg total) by mouth once daily. 90 tablet 3    fish oil-omega-3 fatty acids 300-1,000 mg capsule Take 1 capsule by mouth once daily.      folic acid (FOLVITE) 1 MG tablet Take 1 tablet by mouth everyday  3    furosemide (LASIX) 40 MG tablet TAKE 1 TABLET(40 MG) BY MOUTH EVERY DAY 90 tablet 1    gabapentin (NEURONTIN) 300 MG capsule Take 1 capsule by mouth every morning and at lunch, then 2 capsules every evening  2    insulin (BASAGLAR KWIKPEN U-100 INSULIN) glargine 100 units/mL (3mL) SubQ pen Inject 12 Units into the skin once daily. Take 15 units every 5th day.  0    methotrexate 2.5 MG Tab Take 10 mg by mouth every Monday.       metoprolol succinate (TOPROL-XL) 200 MG 24 hr tablet TAKE 1 TABLET(200 MG) BY MOUTH EVERY DAY 90 tablet 1    oxymetazoline (AFRIN, OXYMETAZOLINE,) 0.05 % nasal spray 2 sprays by Nasal route 2 (two) times daily as needed (bleeding).  0    TRADJENTA 5 mg Tab tablet Take 5 mg by mouth once daily.       UMECLIDINIUM BRM/VILANTEROL TR (ANORO ELLIPTA INHL) Inhale 1 puff into the lungs once daily.       VENTOLIN HFA 90 mcg/actuation inhaler Inhale 2 puffs into the lungs 2 (two) times daily as needed for Wheezing or Shortness of Breath.       vitamin D 1000 units Tab Take 1,000 Units by mouth once daily.        No current facility-administered medications on file prior to visit.        /68   Pulse 60   Ht 5' 3" (1.6 m)   Wt 60.3 kg (133 lb)   LMP  (LMP Unknown)   BMI 23.56 kg/m²       Objective:     Physical Exam   Constitutional: She is oriented to person, place, and time. She appears well-developed and well-nourished.  Non-toxic appearance. She does not appear ill. No distress.   HENT: "   Head: Normocephalic and atraumatic.   Nose: Nose normal.   Mouth/Throat: No oropharyngeal exudate.   Eyes: Right eye exhibits no discharge. Left eye exhibits no discharge. Right conjunctiva is not injected. Left conjunctiva is not injected. Right pupil is round. Left pupil is round. Pupils are equal.   Neck: Neck supple. No JVD present. Carotid bruit is not present. No tracheal deviation present.   Cardiovascular: Normal rate, regular rhythm, S1 normal and S2 normal. PMI is not displaced. Exam reveals gallop and S4. Exam reveals no S3.   Murmur heard.   Midsystolic murmur is present with a grade of 2/6 at the upper right sternal border.  Pulses:       Radial pulses are 2+ on the right side, and 2+ on the left side.        Dorsalis pedis pulses are 1+ on the right side, and 1+ on the left side.        Posterior tibial pulses are 1+ on the right side, and 1+ on the left side.   Pulmonary/Chest: Effort normal and breath sounds normal.   Abdominal: Soft. Normal appearance. There is no hepatosplenomegaly. There is no tenderness.   Musculoskeletal:        Right ankle: She exhibits swelling. She exhibits no ecchymosis and no deformity.        Left ankle: She exhibits swelling. She exhibits no ecchymosis and no deformity.   Lymphadenopathy:        Head (right side): No submandibular adenopathy present.        Head (left side): No submandibular adenopathy present.     She has no cervical adenopathy.   Neurological: She is alert and oriented to person, place, and time. She is not disoriented. No cranial nerve deficit or sensory deficit.   Skin: Skin is warm, dry and intact. No rash noted. She is not diaphoretic. Nails show no clubbing.   Psychiatric: She has a normal mood and affect. Her speech is normal and behavior is normal. Judgment and thought content normal. Cognition and memory are normal.       Assessment:      1. Heart failure, diastolic, chronic    2. Paroxysmal atrial fibrillation    3. Chronic anticoagulation     4. Right bundle branch block    5. Shortness of breath    6. Essential hypertension    7. Hypercholesterolemia    8. Type 2 diabetes mellitus with stage 2 chronic kidney disease, without long-term current use of insulin    9. Chronic kidney disease, stage II (mild)    10. Rheumatoid arthritis involving both hands with positive rheumatoid factor    11. Abnormal liver enzymes        Plan:     1. Heart Failure, Diastolic, Chronic              1/10/2014: CXR: WNL.              2/13/2014: Echo: Normal LV size and function. Mild LVH. Moderate diastolic dysfunction. Elevated LVEDP. Mildly dilated LA.              2/13/2014: Stress MPI: 3:30 min. No CP. Low exercise tolerance. ECG negative. MPI negative.              Suspect exertional dyspnea related to diastolic dysfunction and high LVEDP as well as her pulmonary disease.              Significantly less short of breath since on furosemide 20 mg Q24.              As heart rate appeared to go up with exertion to a level where she felt palpitations increased dose of metoprolol to 100 mg Q24.              9/11/2019: Presents in HF probably due to atrial fibrillation.              9/12/2019: Echo: Normal left ventricular size with mild systolic dysfunction. EF 45%. Moderate LVH. Moderately dilated LA. Moderate MR. Severe TR. SPAP 67 mmHg.               On benazepril 20 mg Q24 and furosemide 40 mg Q24.              Well compensated.     2. Atrial Fibrillation              8/2019: Suspect onset of persistent atrial fibrillation with moderately fast VRR.              [id number] ([id number]).              On metoprolol 100 mg - increased to 200 mg Q24.              Anticoagulation with apixiban 5 mg Q12.              9/13/2019: [facility]: CRISTELA & CV: DONATO: Mild spontaneous echocardiographic contrast. Moderate MR.  J to sinus rhythm.               On metoprolol 200 mg Q24.   No clinical recurrence.     3. Chronic Anticoagulation              8/2019: Suspect onset of persistent  atrial fibrillation with moderately fast VRR.              [id number] ([id number]).              On apixiban 5 mg Q12.   Nop aspirin or NSAID.     4. Right Bundle Branch Block              2018: Diagnosed with RBBB.     5. Shortness of Breath              8/2013: Began experience exertional dyspnea.              1/13/2017: PFTs: Small airway obstruction without significant response to inhalers.              On Anoro inhaler and Ventoline and was in pulmonary rehabilitation program.              Stable.              Dr. Jarek Wade.     6. Hypertension              1992: Diagnosed.              On metoprolol 200 mg Q24, benazepril 20 mg Q24 and furosemide 40 mg Q24.              Keeping log at home.              Well controlled.                            7. Hypercholesterolemia              1992: Diagnosed.              On atorvastatin 20 mg Q24.              Tells me well controlled.     8. Diabetes Mellitus, Type 2              1992: Diagnosed. Complications: CKD2. Medications: Oral agent.              On metformin and Trajenta.              No aspirin as anticoagulated.              Well controlled.     9. Chronic Kidney Disease, Stage 2              3/3/2015: BUN/crea 15/1.0. CrCl 65.              Stable.     10. Rheumatoid Arthritis.              2006: Diagnosed. Involvement: Hands, knees and back.              On methotrexate and folic acid.              Dr. Ezequiel Figueroa Jr..     11. Primary Care              Dr. Mauricio Sood.    F/u 3 months.    Garrtet Calderon M.D.

## 2020-01-01 ENCOUNTER — TELEPHONE (OUTPATIENT)
Dept: CARDIOLOGY | Facility: CLINIC | Age: 80
End: 2020-01-01

## 2020-01-01 ENCOUNTER — HOSPITAL ENCOUNTER (INPATIENT)
Facility: OTHER | Age: 80
LOS: 3 days | DRG: 871 | End: 2020-05-03
Attending: EMERGENCY MEDICINE | Admitting: INTERNAL MEDICINE
Payer: MEDICARE

## 2020-01-01 ENCOUNTER — OFFICE VISIT (OUTPATIENT)
Dept: CARDIOLOGY | Facility: CLINIC | Age: 80
End: 2020-01-01
Attending: INTERNAL MEDICINE
Payer: MEDICARE

## 2020-01-01 VITALS
BODY MASS INDEX: 23.21 KG/M2 | DIASTOLIC BLOOD PRESSURE: 63 MMHG | SYSTOLIC BLOOD PRESSURE: 128 MMHG | HEART RATE: 68 BPM | WEIGHT: 131 LBS | HEIGHT: 63 IN

## 2020-01-01 DIAGNOSIS — I48.91 ATRIAL FIBRILLATION, UNSPECIFIED TYPE: ICD-10-CM

## 2020-01-01 DIAGNOSIS — I48.91 ATRIAL FIBRILLATION WITH RAPID VENTRICULAR RESPONSE: ICD-10-CM

## 2020-01-01 DIAGNOSIS — N18.2 CHRONIC KIDNEY DISEASE, STAGE II (MILD): ICD-10-CM

## 2020-01-01 DIAGNOSIS — N18.30 CKD (CHRONIC KIDNEY DISEASE) STAGE 3, GFR 30-59 ML/MIN: Chronic | ICD-10-CM

## 2020-01-01 DIAGNOSIS — N18.2 TYPE 2 DIABETES MELLITUS WITH STAGE 2 CHRONIC KIDNEY DISEASE, WITHOUT LONG-TERM CURRENT USE OF INSULIN: ICD-10-CM

## 2020-01-01 DIAGNOSIS — M05.742 RHEUMATOID ARTHRITIS INVOLVING BOTH HANDS WITH POSITIVE RHEUMATOID FACTOR: ICD-10-CM

## 2020-01-01 DIAGNOSIS — I48.0 PAROXYSMAL ATRIAL FIBRILLATION: ICD-10-CM

## 2020-01-01 DIAGNOSIS — M05.741 RHEUMATOID ARTHRITIS INVOLVING BOTH HANDS WITH POSITIVE RHEUMATOID FACTOR: Chronic | ICD-10-CM

## 2020-01-01 DIAGNOSIS — I50.33 ACUTE ON CHRONIC DIASTOLIC HEART FAILURE: ICD-10-CM

## 2020-01-01 DIAGNOSIS — M05.742 RHEUMATOID ARTHRITIS INVOLVING BOTH HANDS WITH POSITIVE RHEUMATOID FACTOR: Chronic | ICD-10-CM

## 2020-01-01 DIAGNOSIS — I10 ESSENTIAL HYPERTENSION: ICD-10-CM

## 2020-01-01 DIAGNOSIS — I50.32 HEART FAILURE, DIASTOLIC, CHRONIC: ICD-10-CM

## 2020-01-01 DIAGNOSIS — J18.9 PNEUMONIA OF RIGHT LOWER LOBE DUE TO INFECTIOUS ORGANISM: ICD-10-CM

## 2020-01-01 DIAGNOSIS — J96.01 ACUTE HYPOXEMIC RESPIRATORY FAILURE: Primary | ICD-10-CM

## 2020-01-01 DIAGNOSIS — R06.02 SHORTNESS OF BREATH: ICD-10-CM

## 2020-01-01 DIAGNOSIS — R57.9 SHOCK, UNSPECIFIED: ICD-10-CM

## 2020-01-01 DIAGNOSIS — R00.0 TACHYCARDIA: ICD-10-CM

## 2020-01-01 DIAGNOSIS — Z79.01 CHRONIC ANTICOAGULATION: ICD-10-CM

## 2020-01-01 DIAGNOSIS — N17.9 AKI (ACUTE KIDNEY INJURY): Chronic | ICD-10-CM

## 2020-01-01 DIAGNOSIS — I45.10 RIGHT BUNDLE BRANCH BLOCK: ICD-10-CM

## 2020-01-01 DIAGNOSIS — I48.91 ATRIAL FIBRILLATION: ICD-10-CM

## 2020-01-01 DIAGNOSIS — E11.22 TYPE 2 DIABETES MELLITUS WITH STAGE 2 CHRONIC KIDNEY DISEASE, WITHOUT LONG-TERM CURRENT USE OF INSULIN: ICD-10-CM

## 2020-01-01 DIAGNOSIS — R57.9 SHOCK: ICD-10-CM

## 2020-01-01 DIAGNOSIS — A41.9 SEPTIC SHOCK: ICD-10-CM

## 2020-01-01 DIAGNOSIS — R06.02 SOB (SHORTNESS OF BREATH): ICD-10-CM

## 2020-01-01 DIAGNOSIS — R74.01 TRANSAMINITIS: ICD-10-CM

## 2020-01-01 DIAGNOSIS — M05.741 RHEUMATOID ARTHRITIS INVOLVING BOTH HANDS WITH POSITIVE RHEUMATOID FACTOR: ICD-10-CM

## 2020-01-01 DIAGNOSIS — E78.00 HYPERCHOLESTEROLEMIA: ICD-10-CM

## 2020-01-01 DIAGNOSIS — R00.1 BRADYCARDIA: ICD-10-CM

## 2020-01-01 DIAGNOSIS — R65.21 SEPTIC SHOCK: ICD-10-CM

## 2020-01-01 LAB
ABO + RH BLD: NORMAL
ALBUMIN SERPL BCP-MCNC: 2.4 G/DL (ref 3.5–5.2)
ALBUMIN SERPL BCP-MCNC: 2.8 G/DL (ref 3.5–5.2)
ALBUMIN SERPL BCP-MCNC: 2.9 G/DL (ref 3.5–5.2)
ALBUMIN SERPL BCP-MCNC: 3.1 G/DL (ref 3.5–5.2)
ALBUMIN SERPL BCP-MCNC: 3.3 G/DL (ref 3.5–5.2)
ALBUMIN SERPL BCP-MCNC: 3.7 G/DL (ref 3.5–5.2)
ALBUMIN SERPL BCP-MCNC: 3.7 G/DL (ref 3.5–5.2)
ALBUMIN SERPL BCP-MCNC: 3.8 G/DL (ref 3.5–5.2)
ALLENS TEST: ABNORMAL
ALP SERPL-CCNC: 106 U/L (ref 55–135)
ALP SERPL-CCNC: 128 U/L (ref 55–135)
ALP SERPL-CCNC: 133 U/L (ref 55–135)
ALP SERPL-CCNC: 173 U/L (ref 55–135)
ALP SERPL-CCNC: 174 U/L (ref 55–135)
ALT SERPL W/O P-5'-P-CCNC: 3568 U/L (ref 10–44)
ALT SERPL W/O P-5'-P-CCNC: 3921 U/L (ref 10–44)
ALT SERPL W/O P-5'-P-CCNC: 42 U/L (ref 10–44)
ALT SERPL W/O P-5'-P-CCNC: 4994 U/L (ref 10–44)
ALT SERPL W/O P-5'-P-CCNC: 689 U/L (ref 10–44)
ANION GAP SERPL CALC-SCNC: 18 MMOL/L (ref 8–16)
ANION GAP SERPL CALC-SCNC: 19 MMOL/L (ref 8–16)
ANION GAP SERPL CALC-SCNC: 19 MMOL/L (ref 8–16)
ANION GAP SERPL CALC-SCNC: 20 MMOL/L (ref 8–16)
ANION GAP SERPL CALC-SCNC: 22 MMOL/L (ref 8–16)
ANION GAP SERPL CALC-SCNC: 23 MMOL/L (ref 8–16)
ANION GAP SERPL CALC-SCNC: 25 MMOL/L (ref 8–16)
ANION GAP SERPL CALC-SCNC: 26 MMOL/L (ref 8–16)
ANION GAP SERPL CALC-SCNC: 26 MMOL/L (ref 8–16)
ANION GAP SERPL CALC-SCNC: 28 MMOL/L (ref 8–16)
ANION GAP SERPL CALC-SCNC: 32 MMOL/L (ref 8–16)
ANISOCYTOSIS BLD QL SMEAR: SLIGHT
ASCENDING AORTA: 2.64 CM
AST SERPL-CCNC: 1052 U/L (ref 10–40)
AST SERPL-CCNC: 53 U/L (ref 10–40)
AST SERPL-CCNC: 5873 U/L (ref 10–40)
AST SERPL-CCNC: 9429 U/L (ref 10–40)
AST SERPL-CCNC: ABNORMAL U/L (ref 10–40)
AV INDEX (PROSTH): 0.85
AV MEAN GRADIENT: 1 MMHG
AV PEAK GRADIENT: 3 MMHG
AV VALVE AREA: 2.33 CM2
AV VELOCITY RATIO: 0.79
BACTERIA #/AREA URNS HPF: ABNORMAL /HPF
BASOPHILS # BLD AUTO: 0.02 K/UL (ref 0–0.2)
BASOPHILS # BLD AUTO: 0.02 K/UL (ref 0–0.2)
BASOPHILS # BLD AUTO: 0.13 K/UL (ref 0–0.2)
BASOPHILS # BLD AUTO: ABNORMAL K/UL (ref 0–0.2)
BASOPHILS NFR BLD: 0 % (ref 0–1.9)
BASOPHILS NFR BLD: 0.1 % (ref 0–1.9)
BASOPHILS NFR BLD: 0.2 % (ref 0–1.9)
BASOPHILS NFR BLD: 0.3 % (ref 0–1.9)
BILIRUB DIRECT SERPL-MCNC: 1.4 MG/DL (ref 0.1–0.3)
BILIRUB DIRECT SERPL-MCNC: 1.8 MG/DL (ref 0.1–0.3)
BILIRUB DIRECT SERPL-MCNC: 2.4 MG/DL (ref 0.1–0.3)
BILIRUB SERPL-MCNC: 1.6 MG/DL (ref 0.1–1)
BILIRUB SERPL-MCNC: 2.6 MG/DL (ref 0.1–1)
BILIRUB SERPL-MCNC: 2.8 MG/DL (ref 0.1–1)
BILIRUB SERPL-MCNC: 3 MG/DL (ref 0.1–1)
BILIRUB SERPL-MCNC: 3.9 MG/DL (ref 0.1–1)
BILIRUB UR QL STRIP: NEGATIVE
BLD GP AB SCN CELLS X3 SERPL QL: NORMAL
BLD PROD TYP BPU: NORMAL
BLOOD UNIT EXPIRATION DATE: NORMAL
BLOOD UNIT TYPE CODE: 5100
BLOOD UNIT TYPE: NORMAL
BNP SERPL-MCNC: 725 PG/ML (ref 0–99)
BSA FOR ECHO PROCEDURE: 1.63 M2
BUN SERPL-MCNC: 12 MG/DL (ref 8–23)
BUN SERPL-MCNC: 15 MG/DL (ref 8–23)
BUN SERPL-MCNC: 17 MG/DL (ref 8–23)
BUN SERPL-MCNC: 19 MG/DL (ref 8–23)
BUN SERPL-MCNC: 23 MG/DL (ref 8–23)
BUN SERPL-MCNC: 26 MG/DL (ref 8–23)
BUN SERPL-MCNC: 27 MG/DL (ref 8–23)
BUN SERPL-MCNC: 29 MG/DL (ref 8–23)
BUN SERPL-MCNC: 33 MG/DL (ref 8–23)
BUN SERPL-MCNC: 35 MG/DL (ref 8–23)
BUN SERPL-MCNC: 39 MG/DL (ref 8–23)
BUN SERPL-MCNC: 41 MG/DL (ref 8–23)
BUN SERPL-MCNC: 41 MG/DL (ref 8–23)
BURR CELLS BLD QL SMEAR: ABNORMAL
CA-I BLDV-SCNC: 0.89 MMOL/L (ref 1.06–1.42)
CALCIUM SERPL-MCNC: 10 MG/DL (ref 8.7–10.5)
CALCIUM SERPL-MCNC: 10 MG/DL (ref 8.7–10.5)
CALCIUM SERPL-MCNC: 7.2 MG/DL (ref 8.7–10.5)
CALCIUM SERPL-MCNC: 8.1 MG/DL (ref 8.7–10.5)
CALCIUM SERPL-MCNC: 8.2 MG/DL (ref 8.7–10.5)
CALCIUM SERPL-MCNC: 8.2 MG/DL (ref 8.7–10.5)
CALCIUM SERPL-MCNC: 8.4 MG/DL (ref 8.7–10.5)
CALCIUM SERPL-MCNC: 8.4 MG/DL (ref 8.7–10.5)
CALCIUM SERPL-MCNC: 8.5 MG/DL (ref 8.7–10.5)
CALCIUM SERPL-MCNC: 8.9 MG/DL (ref 8.7–10.5)
CALCIUM SERPL-MCNC: 9.7 MG/DL (ref 8.7–10.5)
CALCIUM SERPL-MCNC: 9.9 MG/DL (ref 8.7–10.5)
CALCIUM SERPL-MCNC: 9.9 MG/DL (ref 8.7–10.5)
CHLORIDE SERPL-SCNC: 100 MMOL/L (ref 95–110)
CHLORIDE SERPL-SCNC: 100 MMOL/L (ref 95–110)
CHLORIDE SERPL-SCNC: 104 MMOL/L (ref 95–110)
CHLORIDE SERPL-SCNC: 105 MMOL/L (ref 95–110)
CHLORIDE SERPL-SCNC: 94 MMOL/L (ref 95–110)
CHLORIDE SERPL-SCNC: 95 MMOL/L (ref 95–110)
CHLORIDE SERPL-SCNC: 95 MMOL/L (ref 95–110)
CHLORIDE SERPL-SCNC: 96 MMOL/L (ref 95–110)
CHLORIDE SERPL-SCNC: 96 MMOL/L (ref 95–110)
CHLORIDE SERPL-SCNC: 97 MMOL/L (ref 95–110)
CHLORIDE SERPL-SCNC: 99 MMOL/L (ref 95–110)
CK SERPL-CCNC: 1190 U/L (ref 20–180)
CK SERPL-CCNC: 3473 U/L (ref 20–180)
CK SERPL-CCNC: 43 U/L (ref 20–180)
CLARITY UR: ABNORMAL
CO2 SERPL-SCNC: 10 MMOL/L (ref 23–29)
CO2 SERPL-SCNC: 11 MMOL/L (ref 23–29)
CO2 SERPL-SCNC: 12 MMOL/L (ref 23–29)
CO2 SERPL-SCNC: 12 MMOL/L (ref 23–29)
CO2 SERPL-SCNC: 14 MMOL/L (ref 23–29)
CO2 SERPL-SCNC: 15 MMOL/L (ref 23–29)
CO2 SERPL-SCNC: 15 MMOL/L (ref 23–29)
CO2 SERPL-SCNC: 16 MMOL/L (ref 23–29)
CO2 SERPL-SCNC: 17 MMOL/L (ref 23–29)
CO2 SERPL-SCNC: 17 MMOL/L (ref 23–29)
CO2 SERPL-SCNC: 19 MMOL/L (ref 23–29)
CO2 SERPL-SCNC: 7 MMOL/L (ref 23–29)
CO2 SERPL-SCNC: 8 MMOL/L (ref 23–29)
CODING SYSTEM: NORMAL
COLOR UR: YELLOW
CREAT SERPL-MCNC: 1.6 MG/DL (ref 0.5–1.4)
CREAT SERPL-MCNC: 1.7 MG/DL (ref 0.5–1.4)
CREAT SERPL-MCNC: 1.7 MG/DL (ref 0.5–1.4)
CREAT SERPL-MCNC: 1.8 MG/DL (ref 0.5–1.4)
CREAT SERPL-MCNC: 2.1 MG/DL (ref 0.5–1.4)
CREAT SERPL-MCNC: 2.1 MG/DL (ref 0.5–1.4)
CREAT SERPL-MCNC: 2.3 MG/DL (ref 0.5–1.4)
CREAT SERPL-MCNC: 2.4 MG/DL (ref 0.5–1.4)
CREAT SERPL-MCNC: 2.5 MG/DL (ref 0.5–1.4)
CREAT SERPL-MCNC: 2.7 MG/DL (ref 0.5–1.4)
CREAT SERPL-MCNC: 2.8 MG/DL (ref 0.5–1.4)
CREAT SERPL-MCNC: 2.8 MG/DL (ref 0.5–1.4)
CREAT SERPL-MCNC: 2.9 MG/DL (ref 0.5–1.4)
CREAT UR-MCNC: 90.3 MG/DL (ref 15–325)
CREAT UR-MCNC: 90.3 MG/DL (ref 15–325)
CRP SERPL-MCNC: 12.6 MG/L (ref 0–8.2)
CRP SERPL-MCNC: 26.2 MG/L (ref 0–8.2)
CV ECHO LV RWT: 0.79 CM
D DIMER PPP IA.FEU-MCNC: 15.81 MG/L FEU
DACRYOCYTES BLD QL SMEAR: ABNORMAL
DELSYS: ABNORMAL
DIFFERENTIAL METHOD: ABNORMAL
DISPENSE STATUS: NORMAL
DOP CALC AO PEAK VEL: 0.82 M/S
DOP CALC AO VTI: 11.93 CM
DOP CALC LVOT AREA: 2.7 CM2
DOP CALC LVOT DIAMETER: 1.87 CM
DOP CALC LVOT PEAK VEL: 0.65 M/S
DOP CALC LVOT STROKE VOLUME: 27.81 CM3
DOP CALCLVOT PEAK VEL VTI: 10.13 CM
ECHO LV POSTERIOR WALL: 1.39 CM (ref 0.6–1.1)
EOSINOPHIL # BLD AUTO: 0 K/UL (ref 0–0.5)
EOSINOPHIL # BLD AUTO: 0 K/UL (ref 0–0.5)
EOSINOPHIL # BLD AUTO: 0.1 K/UL (ref 0–0.5)
EOSINOPHIL # BLD AUTO: ABNORMAL K/UL (ref 0–0.5)
EOSINOPHIL NFR BLD: 0 % (ref 0–8)
EOSINOPHIL NFR BLD: 0.2 % (ref 0–8)
EOSINOPHIL NFR BLD: 0.2 % (ref 0–8)
EOSINOPHIL NFR BLD: 1 % (ref 0–8)
EOSINOPHIL URNS QL WRIGHT STN: NORMAL
ERYTHROCYTE [DISTWIDTH] IN BLOOD BY AUTOMATED COUNT: 16.3 % (ref 11.5–14.5)
ERYTHROCYTE [DISTWIDTH] IN BLOOD BY AUTOMATED COUNT: 16.6 % (ref 11.5–14.5)
ERYTHROCYTE [DISTWIDTH] IN BLOOD BY AUTOMATED COUNT: 16.7 % (ref 11.5–14.5)
ERYTHROCYTE [DISTWIDTH] IN BLOOD BY AUTOMATED COUNT: 16.8 % (ref 11.5–14.5)
ERYTHROCYTE [DISTWIDTH] IN BLOOD BY AUTOMATED COUNT: 17 % (ref 11.5–14.5)
ERYTHROCYTE [DISTWIDTH] IN BLOOD BY AUTOMATED COUNT: 17.2 % (ref 11.5–14.5)
ERYTHROCYTE [DISTWIDTH] IN BLOOD BY AUTOMATED COUNT: 17.3 % (ref 11.5–14.5)
ERYTHROCYTE [DISTWIDTH] IN BLOOD BY AUTOMATED COUNT: 17.8 % (ref 11.5–14.5)
ERYTHROCYTE [DISTWIDTH] IN BLOOD BY AUTOMATED COUNT: 18 % (ref 11.5–14.5)
ERYTHROCYTE [SEDIMENTATION RATE] IN BLOOD BY WESTERGREN METHOD: 16 MM/H
ERYTHROCYTE [SEDIMENTATION RATE] IN BLOOD BY WESTERGREN METHOD: 16 MM/H
ERYTHROCYTE [SEDIMENTATION RATE] IN BLOOD BY WESTERGREN METHOD: 25 MM/H
ERYTHROCYTE [SEDIMENTATION RATE] IN BLOOD BY WESTERGREN METHOD: 30 MM/H
ERYTHROCYTE [SEDIMENTATION RATE] IN BLOOD BY WESTERGREN METHOD: 30 MM/H
EST. GFR  (AFRICAN AMERICAN): 17 ML/MIN/1.73 M^2
EST. GFR  (AFRICAN AMERICAN): 18 ML/MIN/1.73 M^2
EST. GFR  (AFRICAN AMERICAN): 18 ML/MIN/1.73 M^2
EST. GFR  (AFRICAN AMERICAN): 19 ML/MIN/1.73 M^2
EST. GFR  (AFRICAN AMERICAN): 20 ML/MIN/1.73 M^2
EST. GFR  (AFRICAN AMERICAN): 21 ML/MIN/1.73 M^2
EST. GFR  (AFRICAN AMERICAN): 23 ML/MIN/1.73 M^2
EST. GFR  (AFRICAN AMERICAN): 25 ML/MIN/1.73 M^2
EST. GFR  (AFRICAN AMERICAN): 25 ML/MIN/1.73 M^2
EST. GFR  (AFRICAN AMERICAN): 30 ML/MIN/1.73 M^2
EST. GFR  (AFRICAN AMERICAN): 33 ML/MIN/1.73 M^2
EST. GFR  (AFRICAN AMERICAN): 33 ML/MIN/1.73 M^2
EST. GFR  (AFRICAN AMERICAN): 35 ML/MIN/1.73 M^2
EST. GFR  (NON AFRICAN AMERICAN): 15 ML/MIN/1.73 M^2
EST. GFR  (NON AFRICAN AMERICAN): 16 ML/MIN/1.73 M^2
EST. GFR  (NON AFRICAN AMERICAN): 18 ML/MIN/1.73 M^2
EST. GFR  (NON AFRICAN AMERICAN): 19 ML/MIN/1.73 M^2
EST. GFR  (NON AFRICAN AMERICAN): 20 ML/MIN/1.73 M^2
EST. GFR  (NON AFRICAN AMERICAN): 22 ML/MIN/1.73 M^2
EST. GFR  (NON AFRICAN AMERICAN): 22 ML/MIN/1.73 M^2
EST. GFR  (NON AFRICAN AMERICAN): 26 ML/MIN/1.73 M^2
EST. GFR  (NON AFRICAN AMERICAN): 28 ML/MIN/1.73 M^2
EST. GFR  (NON AFRICAN AMERICAN): 28 ML/MIN/1.73 M^2
EST. GFR  (NON AFRICAN AMERICAN): 30 ML/MIN/1.73 M^2
ESTIMATED AVG GLUCOSE: 192 MG/DL (ref 68–131)
FACT X PPP CHRO-ACNC: >1.5 IU/ML (ref 0.3–0.7)
FACT X PPP CHRO-ACNC: >1.5 IU/ML (ref 0.3–0.7)
FIBRINOGEN PPP-MCNC: 148 MG/DL (ref 182–366)
FIBRINOGEN PPP-MCNC: 156 MG/DL (ref 182–366)
FIBRINOGEN PPP-MCNC: 158 MG/DL (ref 182–366)
FIBRINOGEN PPP-MCNC: 193 MG/DL (ref 182–366)
FIO2: 100
FIO2: 100
FIO2: 21
FIO2: 21
FIO2: 24
FIO2: 40
FIO2: 50
FIO2: 60
FIO2: 70
FRACTIONAL SHORTENING: 30 % (ref 28–44)
GLUCOSE SERPL-MCNC: 110 MG/DL (ref 70–110)
GLUCOSE SERPL-MCNC: 119 MG/DL (ref 70–110)
GLUCOSE SERPL-MCNC: 164 MG/DL (ref 70–110)
GLUCOSE SERPL-MCNC: 164 MG/DL (ref 70–110)
GLUCOSE SERPL-MCNC: 207 MG/DL (ref 70–110)
GLUCOSE SERPL-MCNC: 207 MG/DL (ref 70–110)
GLUCOSE SERPL-MCNC: 226 MG/DL (ref 70–110)
GLUCOSE SERPL-MCNC: 247 MG/DL (ref 70–110)
GLUCOSE SERPL-MCNC: 314 MG/DL (ref 70–110)
GLUCOSE SERPL-MCNC: 319 MG/DL (ref 70–110)
GLUCOSE SERPL-MCNC: 57 MG/DL (ref 70–110)
GLUCOSE SERPL-MCNC: 82 MG/DL (ref 70–110)
GLUCOSE SERPL-MCNC: 87 MG/DL (ref 70–110)
GLUCOSE UR QL STRIP: NEGATIVE
HBA1C MFR BLD HPLC: 8.3 % (ref 4–5.6)
HCO3 UR-SCNC: 10.7 MMOL/L (ref 24–28)
HCO3 UR-SCNC: 11.1 MMOL/L (ref 24–28)
HCO3 UR-SCNC: 11.6 MMOL/L (ref 24–28)
HCO3 UR-SCNC: 13.3 MMOL/L (ref 24–28)
HCO3 UR-SCNC: 18.3 MMOL/L (ref 24–28)
HCO3 UR-SCNC: 19.6 MMOL/L (ref 24–28)
HCO3 UR-SCNC: 23.4 MMOL/L (ref 24–28)
HCO3 UR-SCNC: 8.9 MMOL/L (ref 24–28)
HCO3 UR-SCNC: 9.5 MMOL/L (ref 24–28)
HCT VFR BLD AUTO: 23.9 % (ref 37–48.5)
HCT VFR BLD AUTO: 23.9 % (ref 37–48.5)
HCT VFR BLD AUTO: 28.6 % (ref 37–48.5)
HCT VFR BLD AUTO: 29.4 % (ref 37–48.5)
HCT VFR BLD AUTO: 29.5 % (ref 37–48.5)
HCT VFR BLD AUTO: 29.7 % (ref 37–48.5)
HCT VFR BLD AUTO: 35.6 % (ref 37–48.5)
HCT VFR BLD AUTO: 36.2 % (ref 37–48.5)
HCT VFR BLD AUTO: 41.4 % (ref 37–48.5)
HGB BLD-MCNC: 11.5 G/DL (ref 12–16)
HGB BLD-MCNC: 11.7 G/DL (ref 12–16)
HGB BLD-MCNC: 12.7 G/DL (ref 12–16)
HGB BLD-MCNC: 7.3 G/DL (ref 12–16)
HGB BLD-MCNC: 7.4 G/DL (ref 12–16)
HGB BLD-MCNC: 9.1 G/DL (ref 12–16)
HGB BLD-MCNC: 9.5 G/DL (ref 12–16)
HGB BLD-MCNC: 9.6 G/DL (ref 12–16)
HGB BLD-MCNC: 9.8 G/DL (ref 12–16)
HGB UR QL STRIP: ABNORMAL
HYALINE CASTS #/AREA URNS LPF: 3 /LPF
HYPOCHROMIA BLD QL SMEAR: ABNORMAL
IMM GRANULOCYTES # BLD AUTO: 0.07 K/UL (ref 0–0.04)
IMM GRANULOCYTES # BLD AUTO: 0.13 K/UL (ref 0–0.04)
IMM GRANULOCYTES # BLD AUTO: 1.21 K/UL (ref 0–0.04)
IMM GRANULOCYTES # BLD AUTO: ABNORMAL K/UL (ref 0–0.04)
IMM GRANULOCYTES NFR BLD AUTO: 0.8 % (ref 0–0.5)
IMM GRANULOCYTES NFR BLD AUTO: 0.8 % (ref 0–0.5)
IMM GRANULOCYTES NFR BLD AUTO: 2.6 % (ref 0–0.5)
IMM GRANULOCYTES NFR BLD AUTO: ABNORMAL % (ref 0–0.5)
INR PPP: 2.7 (ref 0.8–1.2)
INR PPP: 2.8 (ref 0.8–1.2)
INR PPP: 5.6 (ref 0.8–1.2)
INR PPP: >10 (ref 0.8–1.2)
INTERVENTRICULAR SEPTUM: 1.42 CM (ref 0.6–1.1)
KETONES UR QL STRIP: NEGATIVE
LA MAJOR: 5.61 CM
LA MINOR: 5.23 CM
LA WIDTH: 3.73 CM
LACTATE SERPL-SCNC: 10.2 MMOL/L (ref 0.5–2.2)
LACTATE SERPL-SCNC: 11.1 MMOL/L (ref 0.5–2.2)
LACTATE SERPL-SCNC: >12 MMOL/L (ref 0.5–2.2)
LEFT ATRIUM SIZE: 4.02 CM
LEFT ATRIUM VOLUME INDEX: 41.4 ML/M2
LEFT ATRIUM VOLUME: 69 CM3
LEFT INTERNAL DIMENSION IN SYSTOLE: 2.48 CM (ref 2.1–4)
LEFT VENTRICLE DIASTOLIC VOLUME INDEX: 31.46 ML/M2
LEFT VENTRICLE DIASTOLIC VOLUME: 52.45 ML
LEFT VENTRICLE MASS INDEX: 106 G/M2
LEFT VENTRICLE SYSTOLIC VOLUME INDEX: 13.1 ML/M2
LEFT VENTRICLE SYSTOLIC VOLUME: 21.87 ML
LEFT VENTRICULAR INTERNAL DIMENSION IN DIASTOLE: 3.54 CM (ref 3.5–6)
LEFT VENTRICULAR MASS: 176.72 G
LEUKOCYTE ESTERASE UR QL STRIP: NEGATIVE
LIPASE SERPL-CCNC: 298 U/L (ref 4–60)
LYMPHOCYTES # BLD AUTO: 0.9 K/UL (ref 1–4.8)
LYMPHOCYTES # BLD AUTO: 1.1 K/UL (ref 1–4.8)
LYMPHOCYTES # BLD AUTO: 1.5 K/UL (ref 1–4.8)
LYMPHOCYTES # BLD AUTO: ABNORMAL K/UL (ref 1–4.8)
LYMPHOCYTES NFR BLD: 12.4 % (ref 18–48)
LYMPHOCYTES NFR BLD: 2 % (ref 18–48)
LYMPHOCYTES NFR BLD: 3.3 % (ref 18–48)
LYMPHOCYTES NFR BLD: 5 % (ref 18–48)
LYMPHOCYTES NFR BLD: 5 % (ref 18–48)
LYMPHOCYTES NFR BLD: 5.1 % (ref 18–48)
MAGNESIUM SERPL-MCNC: 1.8 MG/DL (ref 1.6–2.6)
MAGNESIUM SERPL-MCNC: 1.8 MG/DL (ref 1.6–2.6)
MAGNESIUM SERPL-MCNC: 2.1 MG/DL (ref 1.6–2.6)
MAGNESIUM SERPL-MCNC: 2.2 MG/DL (ref 1.6–2.6)
MAGNESIUM SERPL-MCNC: 2.2 MG/DL (ref 1.6–2.6)
MAGNESIUM SERPL-MCNC: 2.3 MG/DL (ref 1.6–2.6)
MAGNESIUM SERPL-MCNC: 2.4 MG/DL (ref 1.6–2.6)
MAGNESIUM SERPL-MCNC: 2.5 MG/DL (ref 1.6–2.6)
MAGNESIUM SERPL-MCNC: 2.5 MG/DL (ref 1.6–2.6)
MCH RBC QN AUTO: 31.6 PG (ref 27–31)
MCH RBC QN AUTO: 32 PG (ref 27–31)
MCH RBC QN AUTO: 32.1 PG (ref 27–31)
MCH RBC QN AUTO: 32.1 PG (ref 27–31)
MCH RBC QN AUTO: 32.2 PG (ref 27–31)
MCH RBC QN AUTO: 32.2 PG (ref 27–31)
MCH RBC QN AUTO: 32.5 PG (ref 27–31)
MCH RBC QN AUTO: 32.6 PG (ref 27–31)
MCH RBC QN AUTO: 32.9 PG (ref 27–31)
MCHC RBC AUTO-ENTMCNC: 30.5 G/DL (ref 32–36)
MCHC RBC AUTO-ENTMCNC: 30.7 G/DL (ref 32–36)
MCHC RBC AUTO-ENTMCNC: 31 G/DL (ref 32–36)
MCHC RBC AUTO-ENTMCNC: 31.8 G/DL (ref 32–36)
MCHC RBC AUTO-ENTMCNC: 31.8 G/DL (ref 32–36)
MCHC RBC AUTO-ENTMCNC: 32 G/DL (ref 32–36)
MCHC RBC AUTO-ENTMCNC: 32.5 G/DL (ref 32–36)
MCHC RBC AUTO-ENTMCNC: 32.9 G/DL (ref 32–36)
MCHC RBC AUTO-ENTMCNC: 33.3 G/DL (ref 32–36)
MCV RBC AUTO: 100 FL (ref 82–98)
MCV RBC AUTO: 100 FL (ref 82–98)
MCV RBC AUTO: 102 FL (ref 82–98)
MCV RBC AUTO: 104 FL (ref 82–98)
MCV RBC AUTO: 106 FL (ref 82–98)
MCV RBC AUTO: 107 FL (ref 82–98)
MCV RBC AUTO: 97 FL (ref 82–98)
MCV RBC AUTO: 98 FL (ref 82–98)
MCV RBC AUTO: 99 FL (ref 82–98)
METAMYELOCYTES NFR BLD MANUAL: 1 %
METAMYELOCYTES NFR BLD MANUAL: 1 %
MICROSCOPIC COMMENT: ABNORMAL
MIN VOL: 10.5
MIN VOL: 4.12
MIN VOL: 4.99
MIN VOL: 7.75
MIN VOL: 7.84
MODE: ABNORMAL
MONOCYTES # BLD AUTO: 0.9 K/UL (ref 0.3–1)
MONOCYTES # BLD AUTO: 1.9 K/UL (ref 0.3–1)
MONOCYTES # BLD AUTO: 2.9 K/UL (ref 0.3–1)
MONOCYTES # BLD AUTO: ABNORMAL K/UL (ref 0.3–1)
MONOCYTES NFR BLD: 10 % (ref 4–15)
MONOCYTES NFR BLD: 10.9 % (ref 4–15)
MONOCYTES NFR BLD: 2 % (ref 4–15)
MONOCYTES NFR BLD: 4 % (ref 4–15)
MONOCYTES NFR BLD: 5 % (ref 4–15)
MONOCYTES NFR BLD: 5 % (ref 4–15)
MONOCYTES NFR BLD: 6 % (ref 4–15)
MONOCYTES NFR BLD: 6 % (ref 4–15)
MONOCYTES NFR BLD: 6.1 % (ref 4–15)
NEUTROPHILS # BLD AUTO: 14.1 K/UL (ref 1.8–7.7)
NEUTROPHILS # BLD AUTO: 41.1 K/UL (ref 1.8–7.7)
NEUTROPHILS # BLD AUTO: 6.6 K/UL (ref 1.8–7.7)
NEUTROPHILS # BLD AUTO: ABNORMAL K/UL (ref 1.8–7.7)
NEUTROPHILS NFR BLD: 66 % (ref 38–73)
NEUTROPHILS NFR BLD: 76.4 % (ref 38–73)
NEUTROPHILS NFR BLD: 80 % (ref 38–73)
NEUTROPHILS NFR BLD: 83.1 % (ref 38–73)
NEUTROPHILS NFR BLD: 84 % (ref 38–73)
NEUTROPHILS NFR BLD: 87 % (ref 38–73)
NEUTROPHILS NFR BLD: 87.5 % (ref 38–73)
NEUTROPHILS NFR BLD: 88 % (ref 38–73)
NEUTROPHILS NFR BLD: 91 % (ref 38–73)
NEUTS BAND NFR BLD MANUAL: 1 %
NEUTS BAND NFR BLD MANUAL: 10 %
NEUTS BAND NFR BLD MANUAL: 2 %
NEUTS BAND NFR BLD MANUAL: 29 %
NEUTS BAND NFR BLD MANUAL: 5 %
NEUTS BAND NFR BLD MANUAL: 8 %
NITRITE UR QL STRIP: NEGATIVE
NRBC BLD-RTO: 0 /100 WBC
NRBC BLD-RTO: 0 /100 WBC
NRBC BLD-RTO: 1 /100 WBC
NRBC BLD-RTO: 1 /100 WBC
NRBC BLD-RTO: 15 /100 WBC
NRBC BLD-RTO: 15 /100 WBC
NRBC BLD-RTO: 2 /100 WBC
NRBC BLD-RTO: 5 /100 WBC
NRBC BLD-RTO: 9 /100 WBC
NUM UNITS TRANS FFP: NORMAL
PCO2 BLDA: 20.1 MMHG (ref 35–45)
PCO2 BLDA: 22.3 MMHG (ref 35–45)
PCO2 BLDA: 24.7 MMHG (ref 35–45)
PCO2 BLDA: 25 MMHG (ref 35–45)
PCO2 BLDA: 27.4 MMHG (ref 35–45)
PCO2 BLDA: 30.7 MMHG (ref 35–45)
PCO2 BLDA: 33.3 MMHG (ref 35–45)
PCO2 BLDA: 40.1 MMHG (ref 35–45)
PCO2 BLDA: 41.4 MMHG (ref 35–45)
PEEP: 5
PH SMN: 6.94 [PH] (ref 7.35–7.45)
PH SMN: 7.05 [PH] (ref 7.35–7.45)
PH SMN: 7.19 [PH] (ref 7.35–7.45)
PH SMN: 7.25 [PH] (ref 7.35–7.45)
PH SMN: 7.3 [PH] (ref 7.35–7.45)
PH SMN: 7.37 [PH] (ref 7.35–7.45)
PH SMN: 7.38 [PH] (ref 7.35–7.45)
PH SMN: 7.46 [PH] (ref 7.35–7.45)
PH SMN: 7.55 [PH] (ref 7.35–7.45)
PH UR STRIP: 5 [PH] (ref 5–8)
PHOSPHATE SERPL-MCNC: 3.2 MG/DL (ref 2.7–4.5)
PHOSPHATE SERPL-MCNC: 3.5 MG/DL (ref 2.7–4.5)
PHOSPHATE SERPL-MCNC: 4 MG/DL (ref 2.7–4.5)
PHOSPHATE SERPL-MCNC: 4.2 MG/DL (ref 2.7–4.5)
PHOSPHATE SERPL-MCNC: 5.1 MG/DL (ref 2.7–4.5)
PHOSPHATE SERPL-MCNC: 6.3 MG/DL (ref 2.7–4.5)
PIP: 20
PIP: 21
PIP: 21
PIP: 23
PIP: 25
PISA TR MAX VEL: 3.11 M/S
PLATELET # BLD AUTO: 112 K/UL (ref 150–350)
PLATELET # BLD AUTO: 131 K/UL (ref 150–350)
PLATELET # BLD AUTO: 154 K/UL (ref 150–350)
PLATELET # BLD AUTO: 61 K/UL (ref 150–350)
PLATELET # BLD AUTO: 62 K/UL (ref 150–350)
PLATELET # BLD AUTO: 63 K/UL (ref 150–350)
PLATELET # BLD AUTO: 72 K/UL (ref 150–350)
PLATELET # BLD AUTO: 76 K/UL (ref 150–350)
PLATELET # BLD AUTO: 79 K/UL (ref 150–350)
PLATELET BLD QL SMEAR: ABNORMAL
PMV BLD AUTO: 12 FL (ref 9.2–12.9)
PMV BLD AUTO: 12.1 FL (ref 9.2–12.9)
PMV BLD AUTO: 12.6 FL (ref 9.2–12.9)
PMV BLD AUTO: 12.9 FL (ref 9.2–12.9)
PMV BLD AUTO: 13.1 FL (ref 9.2–12.9)
PMV BLD AUTO: 13.9 FL (ref 9.2–12.9)
PMV BLD AUTO: 14.7 FL (ref 9.2–12.9)
PO2 BLDA: 144 MMHG (ref 80–100)
PO2 BLDA: 176 MMHG (ref 80–100)
PO2 BLDA: 198 MMHG (ref 80–100)
PO2 BLDA: 226 MMHG (ref 80–100)
PO2 BLDA: 227 MMHG (ref 80–100)
PO2 BLDA: 440 MMHG (ref 80–100)
PO2 BLDA: 85 MMHG (ref 80–100)
PO2 BLDA: 93 MMHG (ref 80–100)
PO2 BLDA: 97 MMHG (ref 80–100)
POC BE: -13 MMOL/L
POC BE: -14 MMOL/L
POC BE: -17 MMOL/L
POC BE: -19 MMOL/L
POC BE: -19 MMOL/L
POC BE: -23 MMOL/L
POC BE: -3 MMOL/L
POC BE: -7 MMOL/L
POC BE: 0 MMOL/L
POC SATURATED O2: 100 % (ref 95–100)
POC SATURATED O2: 94 % (ref 95–100)
POC SATURATED O2: 96 % (ref 95–100)
POC SATURATED O2: 98 % (ref 95–100)
POC SATURATED O2: 99 % (ref 95–100)
POCT GLUCOSE: 113 MG/DL (ref 70–110)
POCT GLUCOSE: 114 MG/DL (ref 70–110)
POCT GLUCOSE: 114 MG/DL (ref 70–110)
POCT GLUCOSE: 115 MG/DL (ref 70–110)
POCT GLUCOSE: 133 MG/DL (ref 70–110)
POCT GLUCOSE: 139 MG/DL (ref 70–110)
POCT GLUCOSE: 146 MG/DL (ref 70–110)
POCT GLUCOSE: 155 MG/DL (ref 70–110)
POCT GLUCOSE: 194 MG/DL (ref 70–110)
POCT GLUCOSE: 197 MG/DL (ref 70–110)
POCT GLUCOSE: 256 MG/DL (ref 70–110)
POCT GLUCOSE: 32 MG/DL (ref 70–110)
POCT GLUCOSE: 33 MG/DL (ref 70–110)
POCT GLUCOSE: 390 MG/DL (ref 70–110)
POCT GLUCOSE: 393 MG/DL (ref 70–110)
POCT GLUCOSE: 431 MG/DL (ref 70–110)
POCT GLUCOSE: 56 MG/DL (ref 70–110)
POCT GLUCOSE: 67 MG/DL (ref 70–110)
POCT GLUCOSE: 73 MG/DL (ref 70–110)
POCT GLUCOSE: 79 MG/DL (ref 70–110)
POCT GLUCOSE: 81 MG/DL (ref 70–110)
POCT GLUCOSE: 82 MG/DL (ref 70–110)
POCT GLUCOSE: 91 MG/DL (ref 70–110)
POCT GLUCOSE: 92 MG/DL (ref 70–110)
POIKILOCYTOSIS BLD QL SMEAR: SLIGHT
POLYCHROMASIA BLD QL SMEAR: ABNORMAL
POTASSIUM SERPL-SCNC: 4 MMOL/L (ref 3.5–5.1)
POTASSIUM SERPL-SCNC: 4.3 MMOL/L (ref 3.5–5.1)
POTASSIUM SERPL-SCNC: 4.4 MMOL/L (ref 3.5–5.1)
POTASSIUM SERPL-SCNC: 4.6 MMOL/L (ref 3.5–5.1)
POTASSIUM SERPL-SCNC: 4.9 MMOL/L (ref 3.5–5.1)
POTASSIUM SERPL-SCNC: 4.9 MMOL/L (ref 3.5–5.1)
POTASSIUM SERPL-SCNC: 5.4 MMOL/L (ref 3.5–5.1)
POTASSIUM SERPL-SCNC: 5.5 MMOL/L (ref 3.5–5.1)
POTASSIUM SERPL-SCNC: 5.6 MMOL/L (ref 3.5–5.1)
POTASSIUM SERPL-SCNC: 5.8 MMOL/L (ref 3.5–5.1)
POTASSIUM SERPL-SCNC: 6.4 MMOL/L (ref 3.5–5.1)
PROCALCITONIN SERPL IA-MCNC: 0.03 NG/ML
PROCALCITONIN SERPL IA-MCNC: 0.38 NG/ML
PROCALCITONIN SERPL IA-MCNC: 1.27 NG/ML
PROCALCITONIN SERPL IA-MCNC: 1.78 NG/ML
PROT SERPL-MCNC: 5 G/DL (ref 6–8.4)
PROT SERPL-MCNC: 6.4 G/DL (ref 6–8.4)
PROT SERPL-MCNC: 6.5 G/DL (ref 6–8.4)
PROT SERPL-MCNC: 7.8 G/DL (ref 6–8.4)
PROT SERPL-MCNC: 8 G/DL (ref 6–8.4)
PROT UR QL STRIP: ABNORMAL
PROT UR-MCNC: 144 MG/DL (ref 0–15)
PROT/CREAT UR: 1.59 MG/G{CREAT} (ref 0–0.2)
PROTHROMBIN TIME: 28.5 SEC (ref 9–12.5)
PROTHROMBIN TIME: 28.8 SEC (ref 9–12.5)
PROTHROMBIN TIME: 56 SEC (ref 9–12.5)
PROTHROMBIN TIME: >100 SEC (ref 9–12.5)
PV PEAK VELOCITY: 0.58 CM/S
RA MAJOR: 5.29 CM
RA PRESSURE: 8 MMHG
RA WIDTH: 4.29 CM
RBC # BLD AUTO: 2.24 M/UL (ref 4–5.4)
RBC # BLD AUTO: 2.25 M/UL (ref 4–5.4)
RBC # BLD AUTO: 2.8 M/UL (ref 4–5.4)
RBC # BLD AUTO: 2.96 M/UL (ref 4–5.4)
RBC # BLD AUTO: 2.98 M/UL (ref 4–5.4)
RBC # BLD AUTO: 3.04 M/UL (ref 4–5.4)
RBC # BLD AUTO: 3.64 M/UL (ref 4–5.4)
RBC # BLD AUTO: 3.65 M/UL (ref 4–5.4)
RBC # BLD AUTO: 3.97 M/UL (ref 4–5.4)
RBC #/AREA URNS HPF: 20 /HPF (ref 0–4)
SAMPLE: ABNORMAL
SARS-COV-2 RDRP RESP QL NAA+PROBE: NEGATIVE
SCHISTOCYTES BLD QL SMEAR: PRESENT
SINUS: 2.78 CM
SITE: ABNORMAL
SODIUM SERPL-SCNC: 132 MMOL/L (ref 136–145)
SODIUM SERPL-SCNC: 133 MMOL/L (ref 136–145)
SODIUM SERPL-SCNC: 134 MMOL/L (ref 136–145)
SODIUM SERPL-SCNC: 136 MMOL/L (ref 136–145)
SODIUM SERPL-SCNC: 136 MMOL/L (ref 136–145)
SODIUM SERPL-SCNC: 139 MMOL/L (ref 136–145)
SODIUM SERPL-SCNC: 140 MMOL/L (ref 136–145)
SODIUM SERPL-SCNC: 141 MMOL/L (ref 136–145)
SODIUM SERPL-SCNC: 141 MMOL/L (ref 136–145)
SODIUM UR-SCNC: 38 MMOL/L (ref 20–250)
SP GR UR STRIP: >=1.03 (ref 1–1.03)
SP02: 100
SP02: 100
SP02: 94
SP02: 96
SP02: 96
SQUAMOUS #/AREA URNS HPF: 5 /HPF
STJ: 2.3 CM
TR MAX PG: 39 MMHG
TRICUSPID ANNULAR PLANE SYSTOLIC EXCURSION: 1.15 CM
TROPONIN I SERPL DL<=0.01 NG/ML-MCNC: 0.05 NG/ML (ref 0–0.03)
TROPONIN I SERPL DL<=0.01 NG/ML-MCNC: 0.15 NG/ML (ref 0–0.03)
TROPONIN I SERPL DL<=0.01 NG/ML-MCNC: 0.29 NG/ML (ref 0–0.03)
TROPONIN I SERPL DL<=0.01 NG/ML-MCNC: 0.79 NG/ML (ref 0–0.03)
TV REST PULMONARY ARTERY PRESSURE: 47 MMHG
URATE SERPL-MCNC: 11.9 MG/DL (ref 2.4–5.7)
URATE SERPL-MCNC: 12.9 MG/DL (ref 2.4–5.7)
URN SPEC COLLECT METH UR: ABNORMAL
UROBILINOGEN UR STRIP-ACNC: NEGATIVE EU/DL
UUN UR-MCNC: 247 MG/DL (ref 140–1050)
VANCOMYCIN SERPL-MCNC: 17.2 UG/ML
VANCOMYCIN SERPL-MCNC: 17.2 UG/ML
VT: 320
VT: 420
WBC # BLD AUTO: 16.95 K/UL (ref 3.9–12.7)
WBC # BLD AUTO: 27.19 K/UL (ref 3.9–12.7)
WBC # BLD AUTO: 27.6 K/UL (ref 3.9–12.7)
WBC # BLD AUTO: 31.51 K/UL (ref 3.9–12.7)
WBC # BLD AUTO: 35.07 K/UL (ref 3.9–12.7)
WBC # BLD AUTO: 35.17 K/UL (ref 3.9–12.7)
WBC # BLD AUTO: 36.82 K/UL (ref 3.9–12.7)
WBC # BLD AUTO: 46.89 K/UL (ref 3.9–12.7)
WBC # BLD AUTO: 8.69 K/UL (ref 3.9–12.7)
WBC #/AREA URNS HPF: 4 /HPF (ref 0–5)
WBC TOXIC VACUOLES BLD QL SMEAR: PRESENT

## 2020-01-01 PROCEDURE — 63600175 PHARM REV CODE 636 W HCPCS

## 2020-01-01 PROCEDURE — 83735 ASSAY OF MAGNESIUM: CPT | Mod: 91

## 2020-01-01 PROCEDURE — 83735 ASSAY OF MAGNESIUM: CPT

## 2020-01-01 PROCEDURE — 80100008 HC CRRT DAILY MAINTENANCE

## 2020-01-01 PROCEDURE — 27100108

## 2020-01-01 PROCEDURE — 85610 PROTHROMBIN TIME: CPT

## 2020-01-01 PROCEDURE — 94002 VENT MGMT INPAT INIT DAY: CPT

## 2020-01-01 PROCEDURE — 95711 VEEG 2-12 HR UNMONITORED: CPT

## 2020-01-01 PROCEDURE — 96361 HYDRATE IV INFUSION ADD-ON: CPT

## 2020-01-01 PROCEDURE — 36415 COLL VENOUS BLD VENIPUNCTURE: CPT

## 2020-01-01 PROCEDURE — 80076 HEPATIC FUNCTION PANEL: CPT

## 2020-01-01 PROCEDURE — 93010 ELECTROCARDIOGRAM REPORT: CPT | Mod: ,,, | Performed by: INTERNAL MEDICINE

## 2020-01-01 PROCEDURE — 27202415 HC CARTRIDGE, CRRT

## 2020-01-01 PROCEDURE — 85379 FIBRIN DEGRADATION QUANT: CPT

## 2020-01-01 PROCEDURE — 97803 MED NUTRITION INDIV SUBSEQ: CPT

## 2020-01-01 PROCEDURE — 80053 COMPREHEN METABOLIC PANEL: CPT

## 2020-01-01 PROCEDURE — S0028 INJECTION, FAMOTIDINE, 20 MG: HCPCS | Performed by: INTERNAL MEDICINE

## 2020-01-01 PROCEDURE — 81000 URINALYSIS NONAUTO W/SCOPE: CPT

## 2020-01-01 PROCEDURE — 20000000 HC ICU ROOM

## 2020-01-01 PROCEDURE — 93041 RHYTHM ECG TRACING: CPT

## 2020-01-01 PROCEDURE — 83605 ASSAY OF LACTIC ACID: CPT | Mod: 91

## 2020-01-01 PROCEDURE — 25000003 PHARM REV CODE 250: Performed by: INTERNAL MEDICINE

## 2020-01-01 PROCEDURE — 99239 HOSP IP/OBS DSCHRG MGMT >30: CPT | Mod: ,,, | Performed by: INTERNAL MEDICINE

## 2020-01-01 PROCEDURE — 27000221 HC OXYGEN, UP TO 24 HOURS

## 2020-01-01 PROCEDURE — 80048 BASIC METABOLIC PNL TOTAL CA: CPT

## 2020-01-01 PROCEDURE — 85025 COMPLETE CBC W/AUTO DIFF WBC: CPT

## 2020-01-01 PROCEDURE — 51702 INSERT TEMP BLADDER CATH: CPT

## 2020-01-01 PROCEDURE — 99239 PR HOSPITAL DISCHARGE DAY,>30 MIN: ICD-10-PCS | Mod: ,,, | Performed by: INTERNAL MEDICINE

## 2020-01-01 PROCEDURE — 84484 ASSAY OF TROPONIN QUANT: CPT

## 2020-01-01 PROCEDURE — 84300 ASSAY OF URINE SODIUM: CPT

## 2020-01-01 PROCEDURE — 99291 PR CRITICAL CARE, E/M 30-74 MINUTES: ICD-10-PCS | Mod: ,,, | Performed by: INTERNAL MEDICINE

## 2020-01-01 PROCEDURE — 99900035 HC TECH TIME PER 15 MIN (STAT)

## 2020-01-01 PROCEDURE — 84145 PROCALCITONIN (PCT): CPT

## 2020-01-01 PROCEDURE — 99223 PR INITIAL HOSPITAL CARE,LEVL III: ICD-10-PCS | Mod: AI,,, | Performed by: INTERNAL MEDICINE

## 2020-01-01 PROCEDURE — 94761 N-INVAS EAR/PLS OXIMETRY MLT: CPT

## 2020-01-01 PROCEDURE — 96376 TX/PRO/DX INJ SAME DRUG ADON: CPT

## 2020-01-01 PROCEDURE — 37799 UNLISTED PX VASCULAR SURGERY: CPT

## 2020-01-01 PROCEDURE — 1159F MED LIST DOCD IN RCRD: CPT | Mod: S$GLB,,, | Performed by: INTERNAL MEDICINE

## 2020-01-01 PROCEDURE — 82803 BLOOD GASES ANY COMBINATION: CPT

## 2020-01-01 PROCEDURE — 82550 ASSAY OF CK (CPK): CPT

## 2020-01-01 PROCEDURE — 94003 VENT MGMT INPAT SUBQ DAY: CPT

## 2020-01-01 PROCEDURE — C9113 INJ PANTOPRAZOLE SODIUM, VIA: HCPCS | Performed by: INTERNAL MEDICINE

## 2020-01-01 PROCEDURE — 51798 US URINE CAPACITY MEASURE: CPT

## 2020-01-01 PROCEDURE — 82330 ASSAY OF CALCIUM: CPT

## 2020-01-01 PROCEDURE — 85007 BL SMEAR W/DIFF WBC COUNT: CPT

## 2020-01-01 PROCEDURE — 86140 C-REACTIVE PROTEIN: CPT

## 2020-01-01 PROCEDURE — C9399 UNCLASSIFIED DRUGS OR BIOLOG: HCPCS | Performed by: INTERNAL MEDICINE

## 2020-01-01 PROCEDURE — 63600175 PHARM REV CODE 636 W HCPCS: Performed by: INTERNAL MEDICINE

## 2020-01-01 PROCEDURE — 99214 OFFICE O/P EST MOD 30 MIN: CPT | Mod: S$GLB,,, | Performed by: INTERNAL MEDICINE

## 2020-01-01 PROCEDURE — 80074 ACUTE HEPATITIS PANEL: CPT

## 2020-01-01 PROCEDURE — 99291 CRITICAL CARE FIRST HOUR: CPT | Mod: ,,, | Performed by: INTERNAL MEDICINE

## 2020-01-01 PROCEDURE — 63600175 PHARM REV CODE 636 W HCPCS: Performed by: HOSPITALIST

## 2020-01-01 PROCEDURE — 80069 RENAL FUNCTION PANEL: CPT | Mod: 91

## 2020-01-01 PROCEDURE — 86901 BLOOD TYPING SEROLOGIC RH(D): CPT

## 2020-01-01 PROCEDURE — 84550 ASSAY OF BLOOD/URIC ACID: CPT | Mod: 91

## 2020-01-01 PROCEDURE — 85520 HEPARIN ASSAY: CPT

## 2020-01-01 PROCEDURE — 25000003 PHARM REV CODE 250: Performed by: HOSPITALIST

## 2020-01-01 PROCEDURE — 83605 ASSAY OF LACTIC ACID: CPT

## 2020-01-01 PROCEDURE — 99223 1ST HOSP IP/OBS HIGH 75: CPT | Mod: AI,,, | Performed by: INTERNAL MEDICINE

## 2020-01-01 PROCEDURE — 3078F PR MOST RECENT DIASTOLIC BLOOD PRESSURE < 80 MM HG: ICD-10-PCS | Mod: CPTII,S$GLB,, | Performed by: INTERNAL MEDICINE

## 2020-01-01 PROCEDURE — 94640 AIRWAY INHALATION TREATMENT: CPT

## 2020-01-01 PROCEDURE — 95718 PR EEG, W/VIDEO, CONT RECORD, I&R, 2-12 HRS: ICD-10-PCS | Mod: ,,, | Performed by: PSYCHIATRY & NEUROLOGY

## 2020-01-01 PROCEDURE — 93005 ELECTROCARDIOGRAM TRACING: CPT

## 2020-01-01 PROCEDURE — 25000242 PHARM REV CODE 250 ALT 637 W/ HCPCS: Performed by: INTERNAL MEDICINE

## 2020-01-01 PROCEDURE — 95718 EEG PHYS/QHP 2-12 HR W/VEEG: CPT | Mod: ,,, | Performed by: PSYCHIATRY & NEUROLOGY

## 2020-01-01 PROCEDURE — 25500020 PHARM REV CODE 255: Performed by: INTERNAL MEDICINE

## 2020-01-01 PROCEDURE — 25000003 PHARM REV CODE 250: Performed by: EMERGENCY MEDICINE

## 2020-01-01 PROCEDURE — 1159F PR MEDICATION LIST DOCUMENTED IN MEDICAL RECORD: ICD-10-PCS | Mod: S$GLB,,, | Performed by: INTERNAL MEDICINE

## 2020-01-01 PROCEDURE — 85027 COMPLETE CBC AUTOMATED: CPT

## 2020-01-01 PROCEDURE — 99214 PR OFFICE/OUTPT VISIT, EST, LEVL IV, 30-39 MIN: ICD-10-PCS | Mod: S$GLB,,, | Performed by: INTERNAL MEDICINE

## 2020-01-01 PROCEDURE — 80048 BASIC METABOLIC PNL TOTAL CA: CPT | Mod: 91

## 2020-01-01 PROCEDURE — 87106 FUNGI IDENTIFICATION YEAST: CPT

## 2020-01-01 PROCEDURE — 97802 MEDICAL NUTRITION INDIV IN: CPT

## 2020-01-01 PROCEDURE — 80202 ASSAY OF VANCOMYCIN: CPT

## 2020-01-01 PROCEDURE — 84540 ASSAY OF URINE/UREA-N: CPT

## 2020-01-01 PROCEDURE — 3074F SYST BP LT 130 MM HG: CPT | Mod: CPTII,S$GLB,, | Performed by: INTERNAL MEDICINE

## 2020-01-01 PROCEDURE — U0002 COVID-19 LAB TEST NON-CDC: HCPCS

## 2020-01-01 PROCEDURE — 25000003 PHARM REV CODE 250

## 2020-01-01 PROCEDURE — 93010 EKG 12-LEAD: ICD-10-PCS | Mod: ,,, | Performed by: INTERNAL MEDICINE

## 2020-01-01 PROCEDURE — 87205 SMEAR GRAM STAIN: CPT

## 2020-01-01 PROCEDURE — 80069 RENAL FUNCTION PANEL: CPT

## 2020-01-01 PROCEDURE — 99291 CRITICAL CARE FIRST HOUR: CPT | Mod: 25

## 2020-01-01 PROCEDURE — C9254 INJECTION, LACOSAMIDE: HCPCS | Performed by: INTERNAL MEDICINE

## 2020-01-01 PROCEDURE — 90945 DIALYSIS ONE EVALUATION: CPT

## 2020-01-01 PROCEDURE — 84550 ASSAY OF BLOOD/URIC ACID: CPT

## 2020-01-01 PROCEDURE — 99900026 HC AIRWAY MAINTENANCE (STAT)

## 2020-01-01 PROCEDURE — 87086 URINE CULTURE/COLONY COUNT: CPT

## 2020-01-01 PROCEDURE — 93010 ELECTROCARDIOGRAM REPORT: CPT | Mod: 76,,, | Performed by: INTERNAL MEDICINE

## 2020-01-01 PROCEDURE — 84484 ASSAY OF TROPONIN QUANT: CPT | Mod: 91

## 2020-01-01 PROCEDURE — 84100 ASSAY OF PHOSPHORUS: CPT

## 2020-01-01 PROCEDURE — 96375 TX/PRO/DX INJ NEW DRUG ADDON: CPT

## 2020-01-01 PROCEDURE — 85520 HEPARIN ASSAY: CPT | Mod: 91

## 2020-01-01 PROCEDURE — 85384 FIBRINOGEN ACTIVITY: CPT

## 2020-01-01 PROCEDURE — 63600175 PHARM REV CODE 636 W HCPCS: Performed by: EMERGENCY MEDICINE

## 2020-01-01 PROCEDURE — 87040 BLOOD CULTURE FOR BACTERIA: CPT | Mod: 59

## 2020-01-01 PROCEDURE — 92950 HEART/LUNG RESUSCITATION CPR: CPT

## 2020-01-01 PROCEDURE — 96374 THER/PROPH/DIAG INJ IV PUSH: CPT

## 2020-01-01 PROCEDURE — 83690 ASSAY OF LIPASE: CPT

## 2020-01-01 PROCEDURE — 84156 ASSAY OF PROTEIN URINE: CPT

## 2020-01-01 PROCEDURE — 85027 COMPLETE CBC AUTOMATED: CPT | Mod: 91

## 2020-01-01 PROCEDURE — 94660 CPAP INITIATION&MGMT: CPT

## 2020-01-01 PROCEDURE — A4217 STERILE WATER/SALINE, 500 ML: HCPCS | Performed by: HOSPITALIST

## 2020-01-01 PROCEDURE — 27000190 HC CPAP FULL FACE MASK W/VALVE

## 2020-01-01 PROCEDURE — 3074F PR MOST RECENT SYSTOLIC BLOOD PRESSURE < 130 MM HG: ICD-10-PCS | Mod: CPTII,S$GLB,, | Performed by: INTERNAL MEDICINE

## 2020-01-01 PROCEDURE — 83036 HEMOGLOBIN GLYCOSYLATED A1C: CPT

## 2020-01-01 PROCEDURE — P9017 PLASMA 1 DONOR FRZ W/IN 8 HR: HCPCS

## 2020-01-01 PROCEDURE — 87070 CULTURE OTHR SPECIMN AEROBIC: CPT

## 2020-01-01 PROCEDURE — 27200966 HC CLOSED SUCTION SYSTEM

## 2020-01-01 PROCEDURE — 83880 ASSAY OF NATRIURETIC PEPTIDE: CPT

## 2020-01-01 PROCEDURE — 85384 FIBRINOGEN ACTIVITY: CPT | Mod: 91

## 2020-01-01 PROCEDURE — 3078F DIAST BP <80 MM HG: CPT | Mod: CPTII,S$GLB,, | Performed by: INTERNAL MEDICINE

## 2020-01-01 RX ORDER — LORAZEPAM 2 MG/ML
INJECTION INTRAMUSCULAR
Status: COMPLETED
Start: 2020-01-01 | End: 2020-01-01

## 2020-01-01 RX ORDER — FUROSEMIDE 10 MG/ML
60 INJECTION INTRAMUSCULAR; INTRAVENOUS ONCE
Status: COMPLETED | OUTPATIENT
Start: 2020-01-01 | End: 2020-01-01

## 2020-01-01 RX ORDER — SODIUM CHLORIDE 0.9 % (FLUSH) 0.9 %
10 SYRINGE (ML) INJECTION
Status: CANCELLED | OUTPATIENT
Start: 2020-01-01

## 2020-01-01 RX ORDER — HEPARIN SODIUM 5000 [USP'U]/ML
5000 INJECTION, SOLUTION INTRAVENOUS; SUBCUTANEOUS EVERY 8 HOURS
Status: CANCELLED | OUTPATIENT
Start: 2020-01-01

## 2020-01-01 RX ORDER — METOPROLOL TARTRATE 1 MG/ML
5 INJECTION, SOLUTION INTRAVENOUS ONCE
Status: COMPLETED | OUTPATIENT
Start: 2020-01-01 | End: 2020-01-01

## 2020-01-01 RX ORDER — FENTANYL CITRATE 50 UG/ML
100 INJECTION, SOLUTION INTRAMUSCULAR; INTRAVENOUS ONCE
Status: COMPLETED | OUTPATIENT
Start: 2020-01-01 | End: 2020-01-01

## 2020-01-01 RX ORDER — ONDANSETRON 4 MG/1
4 TABLET, ORALLY DISINTEGRATING ORAL EVERY 8 HOURS PRN
Status: DISCONTINUED | OUTPATIENT
Start: 2020-01-01 | End: 2020-05-04 | Stop reason: HOSPADM

## 2020-01-01 RX ORDER — HYDROCODONE BITARTRATE AND ACETAMINOPHEN 500; 5 MG/1; MG/1
TABLET ORAL
Status: DISCONTINUED | OUTPATIENT
Start: 2020-01-01 | End: 2020-01-01

## 2020-01-01 RX ORDER — METOPROLOL SUCCINATE 200 MG/1
TABLET, EXTENDED RELEASE ORAL
Qty: 90 TABLET | Refills: 3 | OUTPATIENT
Start: 2020-01-01

## 2020-01-01 RX ORDER — GLUCAGON 1 MG
1 KIT INJECTION
Status: DISCONTINUED | OUTPATIENT
Start: 2020-01-01 | End: 2020-05-04 | Stop reason: HOSPADM

## 2020-01-01 RX ORDER — VANCOMYCIN HCL IN 5 % DEXTROSE 1G/250ML
1000 PLASTIC BAG, INJECTION (ML) INTRAVENOUS ONCE
Status: DISCONTINUED | OUTPATIENT
Start: 2020-01-01 | End: 2020-01-01 | Stop reason: CLARIF

## 2020-01-01 RX ORDER — SODIUM CHLORIDE 0.9 % (FLUSH) 0.9 %
10 SYRINGE (ML) INJECTION
Status: DISCONTINUED | OUTPATIENT
Start: 2020-01-01 | End: 2020-05-04 | Stop reason: HOSPADM

## 2020-01-01 RX ORDER — FAMOTIDINE 10 MG/ML
20 INJECTION INTRAVENOUS DAILY
Status: DISCONTINUED | OUTPATIENT
Start: 2020-01-01 | End: 2020-01-01

## 2020-01-01 RX ORDER — CEFEPIME HYDROCHLORIDE 1 G/50ML
2 INJECTION, SOLUTION INTRAVENOUS
Status: DISCONTINUED | OUTPATIENT
Start: 2020-01-01 | End: 2020-01-01

## 2020-01-01 RX ORDER — LIDOCAINE HYDROCHLORIDE 10 MG/ML
5 INJECTION INFILTRATION; PERINEURAL
Status: DISCONTINUED | OUTPATIENT
Start: 2020-01-01 | End: 2020-05-04 | Stop reason: HOSPADM

## 2020-01-01 RX ORDER — METOPROLOL TARTRATE 1 MG/ML
5 INJECTION, SOLUTION INTRAVENOUS
Status: COMPLETED | OUTPATIENT
Start: 2020-01-01 | End: 2020-01-01

## 2020-01-01 RX ORDER — ONDANSETRON 2 MG/ML
4 INJECTION INTRAMUSCULAR; INTRAVENOUS
Status: COMPLETED | OUTPATIENT
Start: 2020-01-01 | End: 2020-01-01

## 2020-01-01 RX ORDER — IBUPROFEN 200 MG
24 TABLET ORAL
Status: DISCONTINUED | OUTPATIENT
Start: 2020-01-01 | End: 2020-05-04 | Stop reason: HOSPADM

## 2020-01-01 RX ORDER — FUROSEMIDE 10 MG/ML
60 INJECTION INTRAMUSCULAR; INTRAVENOUS 2 TIMES DAILY
Status: DISCONTINUED | OUTPATIENT
Start: 2020-01-01 | End: 2020-01-01

## 2020-01-01 RX ORDER — PROPOFOL 10 MG/ML
5 INJECTION, EMULSION INTRAVENOUS CONTINUOUS
Status: DISCONTINUED | OUTPATIENT
Start: 2020-01-01 | End: 2020-05-04 | Stop reason: HOSPADM

## 2020-01-01 RX ORDER — HYDROCODONE BITARTRATE AND ACETAMINOPHEN 500; 5 MG/1; MG/1
TABLET ORAL
Status: DISCONTINUED | OUTPATIENT
Start: 2020-01-01 | End: 2020-05-04 | Stop reason: HOSPADM

## 2020-01-01 RX ORDER — SODIUM CITRATE AND CITRIC ACID MONOHYDRATE 334; 500 MG/5ML; MG/5ML
30 SOLUTION ORAL 2 TIMES DAILY
Status: DISCONTINUED | OUTPATIENT
Start: 2020-01-01 | End: 2020-05-04 | Stop reason: HOSPADM

## 2020-01-01 RX ORDER — VANCOMYCIN HCL IN 5 % DEXTROSE 1.5G/250ML
1500 PLASTIC BAG, INJECTION (ML) INTRAVENOUS ONCE
Status: DISCONTINUED | OUTPATIENT
Start: 2020-01-01 | End: 2020-01-01

## 2020-01-01 RX ORDER — METOPROLOL TARTRATE 25 MG/1
25 TABLET, FILM COATED ORAL 4 TIMES DAILY
Status: DISCONTINUED | OUTPATIENT
Start: 2020-01-01 | End: 2020-01-01

## 2020-01-01 RX ORDER — ATROPINE SULFATE 0.1 MG/ML
1 INJECTION INTRAVENOUS ONCE
Status: DISCONTINUED | OUTPATIENT
Start: 2020-01-01 | End: 2020-05-04 | Stop reason: HOSPADM

## 2020-01-01 RX ORDER — FLUTICASONE FUROATE AND VILANTEROL 200; 25 UG/1; UG/1
1 POWDER RESPIRATORY (INHALATION) DAILY
Status: DISCONTINUED | OUTPATIENT
Start: 2020-01-01 | End: 2020-01-01

## 2020-01-01 RX ORDER — FUROSEMIDE 10 MG/ML
80 INJECTION INTRAMUSCULAR; INTRAVENOUS 3 TIMES DAILY
Status: DISCONTINUED | OUTPATIENT
Start: 2020-01-01 | End: 2020-01-01

## 2020-01-01 RX ORDER — ATORVASTATIN CALCIUM 20 MG/1
20 TABLET, FILM COATED ORAL DAILY
Status: DISCONTINUED | OUTPATIENT
Start: 2020-01-01 | End: 2020-01-01

## 2020-01-01 RX ORDER — MAGNESIUM SULFATE HEPTAHYDRATE 40 MG/ML
2 INJECTION, SOLUTION INTRAVENOUS
Status: DISPENSED | OUTPATIENT
Start: 2020-01-01 | End: 2020-01-01

## 2020-01-01 RX ORDER — METOPROLOL SUCCINATE 50 MG/1
200 TABLET, EXTENDED RELEASE ORAL
Status: COMPLETED | OUTPATIENT
Start: 2020-01-01 | End: 2020-01-01

## 2020-01-01 RX ORDER — NOREPINEPHRINE BITARTRATE 1 MG/ML
INJECTION, SOLUTION INTRAVENOUS
Status: COMPLETED
Start: 2020-01-01 | End: 2020-01-01

## 2020-01-01 RX ORDER — METOPROLOL SUCCINATE 50 MG/1
200 TABLET, EXTENDED RELEASE ORAL DAILY
Status: DISCONTINUED | OUTPATIENT
Start: 2020-01-01 | End: 2020-01-01

## 2020-01-01 RX ORDER — AMIODARONE HYDROCHLORIDE 200 MG/1
200 TABLET ORAL
Status: DISCONTINUED | OUTPATIENT
Start: 2020-01-01 | End: 2020-01-01

## 2020-01-01 RX ORDER — INSULIN ASPART 100 [IU]/ML
0-5 INJECTION, SOLUTION INTRAVENOUS; SUBCUTANEOUS EVERY 6 HOURS PRN
Status: DISCONTINUED | OUTPATIENT
Start: 2020-01-01 | End: 2020-01-01

## 2020-01-01 RX ORDER — IPRATROPIUM BROMIDE AND ALBUTEROL SULFATE 2.5; .5 MG/3ML; MG/3ML
3 SOLUTION RESPIRATORY (INHALATION) EVERY 4 HOURS PRN
Status: DISCONTINUED | OUTPATIENT
Start: 2020-01-01 | End: 2020-05-04 | Stop reason: HOSPADM

## 2020-01-01 RX ORDER — ATROPINE SULFATE 0.1 MG/ML
INJECTION INTRAVENOUS CODE/TRAUMA/SEDATION MEDICATION
Status: COMPLETED | OUTPATIENT
Start: 2020-01-01 | End: 2020-01-01

## 2020-01-01 RX ORDER — FENTANYL CITRATE 50 UG/ML
INJECTION, SOLUTION INTRAMUSCULAR; INTRAVENOUS
Status: COMPLETED
Start: 2020-01-01 | End: 2020-01-01

## 2020-01-01 RX ORDER — INSULIN ASPART 100 [IU]/ML
0-5 INJECTION, SOLUTION INTRAVENOUS; SUBCUTANEOUS
Status: DISCONTINUED | OUTPATIENT
Start: 2020-01-01 | End: 2020-01-01

## 2020-01-01 RX ORDER — SODIUM CHLORIDE 9 MG/ML
500 INJECTION, SOLUTION INTRAVENOUS
Status: COMPLETED | OUTPATIENT
Start: 2020-01-01 | End: 2020-01-01

## 2020-01-01 RX ORDER — INDOMETHACIN 25 MG/1
50 CAPSULE ORAL ONCE
Status: DISCONTINUED | OUTPATIENT
Start: 2020-01-01 | End: 2020-05-04 | Stop reason: HOSPADM

## 2020-01-01 RX ORDER — NOREPINEPHRINE BITARTRATE 1 MG/ML
INJECTION, SOLUTION INTRAVENOUS
Status: DISCONTINUED
Start: 2020-01-01 | End: 2020-01-01 | Stop reason: WASHOUT

## 2020-01-01 RX ORDER — EPINEPHRINE 0.1 MG/ML
INJECTION INTRAVENOUS
Status: COMPLETED
Start: 2020-01-01 | End: 2020-01-01

## 2020-01-01 RX ORDER — DOPAMINE HYDROCHLORIDE 160 MG/100ML
5 INJECTION, SOLUTION INTRAVENOUS CONTINUOUS
Status: DISCONTINUED | OUTPATIENT
Start: 2020-01-01 | End: 2020-05-04 | Stop reason: HOSPADM

## 2020-01-01 RX ORDER — FENTANYL CITRATE-0.9 % NACL/PF 10 MCG/ML
25 PLASTIC BAG, INJECTION (ML) INTRAVENOUS CONTINUOUS
Status: DISCONTINUED | OUTPATIENT
Start: 2020-01-01 | End: 2020-05-04 | Stop reason: HOSPADM

## 2020-01-01 RX ORDER — DEXTROSE MONOHYDRATE 100 MG/ML
INJECTION, SOLUTION INTRAVENOUS CONTINUOUS
Status: DISCONTINUED | OUTPATIENT
Start: 2020-01-01 | End: 2020-05-04 | Stop reason: HOSPADM

## 2020-01-01 RX ORDER — INDOMETHACIN 25 MG/1
50 CAPSULE ORAL ONCE
Status: COMPLETED | OUTPATIENT
Start: 2020-01-01 | End: 2020-01-01

## 2020-01-01 RX ORDER — ATROPINE SULFATE 0.1 MG/ML
INJECTION INTRAVENOUS
Status: DISPENSED
Start: 2020-01-01 | End: 2020-01-01

## 2020-01-01 RX ORDER — FUROSEMIDE 20 MG/1
TABLET ORAL
Qty: 90 TABLET | Refills: 0 | Status: SHIPPED | OUTPATIENT
Start: 2020-01-01 | End: 2020-01-01 | Stop reason: CLARIF

## 2020-01-01 RX ORDER — EPINEPHRINE 0.1 MG/ML
INJECTION INTRAVENOUS CODE/TRAUMA/SEDATION MEDICATION
Status: COMPLETED | OUTPATIENT
Start: 2020-01-01 | End: 2020-01-01

## 2020-01-01 RX ORDER — PANTOPRAZOLE SODIUM 40 MG/10ML
40 INJECTION, POWDER, LYOPHILIZED, FOR SOLUTION INTRAVENOUS 2 TIMES DAILY
Status: DISCONTINUED | OUTPATIENT
Start: 2020-01-01 | End: 2020-05-04 | Stop reason: HOSPADM

## 2020-01-01 RX ORDER — IPRATROPIUM BROMIDE AND ALBUTEROL SULFATE 2.5; .5 MG/3ML; MG/3ML
3 SOLUTION RESPIRATORY (INHALATION) EVERY 6 HOURS
Status: DISCONTINUED | OUTPATIENT
Start: 2020-01-01 | End: 2020-05-04 | Stop reason: HOSPADM

## 2020-01-01 RX ORDER — ATROPINE SULFATE 0.1 MG/ML
INJECTION INTRAVENOUS
Status: COMPLETED
Start: 2020-01-01 | End: 2020-01-01

## 2020-01-01 RX ORDER — NOREPINEPHRINE BITARTRATE/D5W 4MG/250ML
0.02 PLASTIC BAG, INJECTION (ML) INTRAVENOUS CONTINUOUS
Status: DISCONTINUED | OUTPATIENT
Start: 2020-01-01 | End: 2020-01-01

## 2020-01-01 RX ORDER — IBUPROFEN 200 MG
16 TABLET ORAL
Status: DISCONTINUED | OUTPATIENT
Start: 2020-01-01 | End: 2020-05-04 | Stop reason: HOSPADM

## 2020-01-01 RX ORDER — EPINEPHRINE 0.1 MG/ML
0.5 INJECTION INTRAVENOUS ONCE
Status: DISCONTINUED | OUTPATIENT
Start: 2020-01-01 | End: 2020-05-04 | Stop reason: HOSPADM

## 2020-01-01 RX ORDER — CHLORHEXIDINE GLUCONATE ORAL RINSE 1.2 MG/ML
15 SOLUTION DENTAL 2 TIMES DAILY
Status: DISCONTINUED | OUTPATIENT
Start: 2020-01-01 | End: 2020-05-04 | Stop reason: HOSPADM

## 2020-01-01 RX ORDER — HEPARIN SODIUM 5000 [USP'U]/ML
5000 INJECTION, SOLUTION INTRAVENOUS; SUBCUTANEOUS EVERY 8 HOURS
Status: DISCONTINUED | OUTPATIENT
Start: 2020-01-01 | End: 2020-01-01

## 2020-01-01 RX ORDER — ONDANSETRON 2 MG/ML
4 INJECTION INTRAMUSCULAR; INTRAVENOUS EVERY 8 HOURS PRN
Status: DISCONTINUED | OUTPATIENT
Start: 2020-01-01 | End: 2020-05-04 | Stop reason: HOSPADM

## 2020-01-01 RX ORDER — HEPARIN SODIUM 5000 [USP'U]/ML
5000 INJECTION, SOLUTION INTRAVENOUS; SUBCUTANEOUS
Status: DISCONTINUED | OUTPATIENT
Start: 2020-01-01 | End: 2020-05-04 | Stop reason: HOSPADM

## 2020-01-01 RX ORDER — INSULIN ASPART 100 [IU]/ML
1-10 INJECTION, SOLUTION INTRAVENOUS; SUBCUTANEOUS EVERY 6 HOURS PRN
Status: DISCONTINUED | OUTPATIENT
Start: 2020-01-01 | End: 2020-01-01

## 2020-01-01 RX ORDER — METOPROLOL TARTRATE 25 MG/1
25 TABLET, FILM COATED ORAL 2 TIMES DAILY
Status: DISCONTINUED | OUTPATIENT
Start: 2020-01-01 | End: 2020-01-01

## 2020-01-01 RX ORDER — FLUDROCORTISONE ACETATE 0.1 MG/1
100 TABLET ORAL DAILY
Status: DISCONTINUED | OUTPATIENT
Start: 2020-01-01 | End: 2020-05-04 | Stop reason: HOSPADM

## 2020-01-01 RX ADMIN — DEXTROSE MONOHYDRATE 25 G: 25 INJECTION, SOLUTION INTRAVENOUS at 06:05

## 2020-01-01 RX ADMIN — NOREPINEPHRINE BITARTRATE 0.8 MCG/KG/MIN: 1 INJECTION, SOLUTION, CONCENTRATE INTRAVENOUS at 03:05

## 2020-01-01 RX ADMIN — DEXTROSE MONOHYDRATE 12.5 G: 25 INJECTION, SOLUTION INTRAVENOUS at 12:05

## 2020-01-01 RX ADMIN — FENTANYL CITRATE 100 MCG: 50 INJECTION, SOLUTION INTRAMUSCULAR; INTRAVENOUS at 02:05

## 2020-01-01 RX ADMIN — VANCOMYCIN HYDROCHLORIDE: 1.25 INJECTION, POWDER, LYOPHILIZED, FOR SOLUTION INTRAVENOUS at 08:05

## 2020-01-01 RX ADMIN — IOHEXOL 75 ML: 350 INJECTION, SOLUTION INTRAVENOUS at 11:05

## 2020-01-01 RX ADMIN — DOPAMINE HYDROCHLORIDE 5 MCG/KG/MIN: 160 INJECTION, SOLUTION INTRAVENOUS at 08:05

## 2020-01-01 RX ADMIN — SODIUM CHLORIDE, SODIUM LACTATE, POTASSIUM CHLORIDE, AND CALCIUM CHLORIDE 250 ML: .6; .31; .03; .02 INJECTION, SOLUTION INTRAVENOUS at 09:05

## 2020-01-01 RX ADMIN — METOPROLOL TARTRATE 5 MG: 1 INJECTION, SOLUTION INTRAVENOUS at 03:04

## 2020-01-01 RX ADMIN — INSULIN ASPART 6 UNITS: 100 INJECTION, SOLUTION INTRAVENOUS; SUBCUTANEOUS at 04:05

## 2020-01-01 RX ADMIN — DEXTROSE MONOHYDRATE 25 G: 25 INJECTION, SOLUTION INTRAVENOUS at 05:05

## 2020-01-01 RX ADMIN — INSULIN ASPART 10 UNITS: 100 INJECTION, SOLUTION INTRAVENOUS; SUBCUTANEOUS at 09:05

## 2020-01-01 RX ADMIN — IPRATROPIUM BROMIDE AND ALBUTEROL SULFATE 3 ML: .5; 3 SOLUTION RESPIRATORY (INHALATION) at 07:05

## 2020-01-01 RX ADMIN — HYDROCORTISONE SODIUM SUCCINATE 100 MG: 100 INJECTION, POWDER, FOR SOLUTION INTRAMUSCULAR; INTRAVENOUS at 03:05

## 2020-01-01 RX ADMIN — HYDROCORTISONE SODIUM SUCCINATE 100 MG: 100 INJECTION, POWDER, FOR SOLUTION INTRAMUSCULAR; INTRAVENOUS at 02:05

## 2020-01-01 RX ADMIN — AMIODARONE HYDROCHLORIDE 200 MG: 200 TABLET ORAL at 11:05

## 2020-01-01 RX ADMIN — Medication 2 MCG/KG/MIN: at 02:05

## 2020-01-01 RX ADMIN — Medication 25 MCG/HR: at 11:05

## 2020-01-01 RX ADMIN — VASOPRESSIN 0.04 UNITS/MIN: 20 INJECTION INTRAVENOUS at 06:05

## 2020-01-01 RX ADMIN — AMIODARONE HYDROCHLORIDE 0.5 MG/MIN: 1.8 INJECTION, SOLUTION INTRAVENOUS at 11:04

## 2020-01-01 RX ADMIN — IPRATROPIUM BROMIDE AND ALBUTEROL SULFATE 3 ML: .5; 3 SOLUTION RESPIRATORY (INHALATION) at 01:05

## 2020-01-01 RX ADMIN — SODIUM BICARBONATE: 84 INJECTION, SOLUTION INTRAVENOUS at 12:05

## 2020-01-01 RX ADMIN — NOREPINEPHRINE BITARTRATE 0.7 MCG/KG/MIN: 1 INJECTION, SOLUTION, CONCENTRATE INTRAVENOUS at 06:05

## 2020-01-01 RX ADMIN — CHLORHEXIDINE GLUCONATE 0.12% ORAL RINSE 15 ML: 1.2 LIQUID ORAL at 08:05

## 2020-01-01 RX ADMIN — NOREPINEPHRINE BITARTRATE 0.7 MCG/KG/MIN: 1 INJECTION, SOLUTION, CONCENTRATE INTRAVENOUS at 02:05

## 2020-01-01 RX ADMIN — FUROSEMIDE 60 MG: 10 INJECTION, SOLUTION INTRAMUSCULAR; INTRAVENOUS at 05:04

## 2020-01-01 RX ADMIN — AMIODARONE HYDROCHLORIDE 200 MG: 200 TABLET ORAL at 07:04

## 2020-01-01 RX ADMIN — CEFEPIME 2 G: 2 INJECTION, POWDER, FOR SOLUTION INTRAVENOUS at 02:05

## 2020-01-01 RX ADMIN — SODIUM CITRATE AND CITRIC ACID MONOHYDRATE 30 ML: 500; 334 SOLUTION ORAL at 09:05

## 2020-01-01 RX ADMIN — PATIROMER: 8.4 POWDER, FOR SUSPENSION ORAL at 12:05

## 2020-01-01 RX ADMIN — CEFEPIME 2 G: 2 INJECTION, POWDER, FOR SOLUTION INTRAVENOUS at 05:05

## 2020-01-01 RX ADMIN — VASOPRESSIN 0.04 UNITS/MIN: 20 INJECTION INTRAVENOUS at 02:05

## 2020-01-01 RX ADMIN — EPINEPHRINE 1 MG: 0.1 INJECTION, SOLUTION ENDOTRACHEAL; INTRACARDIAC; INTRAVENOUS at 01:05

## 2020-01-01 RX ADMIN — NOREPINEPHRINE BITARTRATE 1.2 MCG/KG/MIN: 1 INJECTION, SOLUTION, CONCENTRATE INTRAVENOUS at 11:05

## 2020-01-01 RX ADMIN — SODIUM BICARBONATE: 84 INJECTION, SOLUTION INTRAVENOUS at 05:05

## 2020-01-01 RX ADMIN — EPINEPHRINE 0.02 MCG/KG/MIN: 1 INJECTION INTRAMUSCULAR; INTRAVENOUS; SUBCUTANEOUS at 08:05

## 2020-01-01 RX ADMIN — AZITHROMYCIN MONOHYDRATE 500 MG: 500 INJECTION, POWDER, LYOPHILIZED, FOR SOLUTION INTRAVENOUS at 01:05

## 2020-01-01 RX ADMIN — VANCOMYCIN HYDROCHLORIDE 1.5 G: 1.5 INJECTION, POWDER, LYOPHILIZED, FOR SOLUTION INTRAVENOUS at 12:05

## 2020-01-01 RX ADMIN — CEFEPIME 2 G: 2 INJECTION, POWDER, FOR SOLUTION INTRAVENOUS at 08:05

## 2020-01-01 RX ADMIN — METOPROLOL TARTRATE 5 MG: 1 INJECTION, SOLUTION INTRAVENOUS at 04:04

## 2020-01-01 RX ADMIN — HYDROCORTISONE SODIUM SUCCINATE 100 MG: 100 INJECTION, POWDER, FOR SOLUTION INTRAMUSCULAR; INTRAVENOUS at 05:05

## 2020-01-01 RX ADMIN — AMIODARONE HYDROCHLORIDE 150 MG: 1.5 INJECTION, SOLUTION INTRAVENOUS at 05:04

## 2020-01-01 RX ADMIN — SODIUM BICARBONATE 50 MEQ: 84 INJECTION, SOLUTION INTRAVENOUS at 05:05

## 2020-01-01 RX ADMIN — FLUDROCORTISONE ACETATE 100 MCG: 0.1 TABLET ORAL at 08:05

## 2020-01-01 RX ADMIN — SODIUM CITRATE AND CITRIC ACID MONOHYDRATE 30 ML: 500; 334 SOLUTION ORAL at 08:05

## 2020-01-01 RX ADMIN — CEFEPIME HYDROCHLORIDE 2 G: 2 INJECTION, SOLUTION INTRAVENOUS at 12:05

## 2020-01-01 RX ADMIN — AMPICILLIN SODIUM 2 G: 2 INJECTION, POWDER, FOR SOLUTION INTRAMUSCULAR; INTRAVENOUS at 09:05

## 2020-01-01 RX ADMIN — FAMOTIDINE 20 MG: 10 INJECTION INTRAVENOUS at 02:05

## 2020-01-01 RX ADMIN — METOPROLOL TARTRATE 25 MG: 25 TABLET ORAL at 08:05

## 2020-01-01 RX ADMIN — ATROPINE SULFATE 1 MG: 0.1 INJECTION, SOLUTION INTRAVENOUS at 01:05

## 2020-01-01 RX ADMIN — SODIUM BICARBONATE: 84 INJECTION, SOLUTION INTRAVENOUS at 08:05

## 2020-01-01 RX ADMIN — ONDANSETRON 4 MG: 2 INJECTION INTRAMUSCULAR; INTRAVENOUS at 01:05

## 2020-01-01 RX ADMIN — ACYCLOVIR SODIUM 520 MG: 50 INJECTION, SOLUTION INTRAVENOUS at 06:05

## 2020-01-01 RX ADMIN — PROPOFOL 5 MCG/KG/MIN: 10 INJECTION, EMULSION INTRAVENOUS at 10:05

## 2020-01-01 RX ADMIN — SODIUM CITRATE AND CITRIC ACID MONOHYDRATE 30 ML: 500; 334 SOLUTION ORAL at 12:05

## 2020-01-01 RX ADMIN — LORAZEPAM 2 MG: 2 INJECTION INTRAMUSCULAR; INTRAVENOUS at 05:05

## 2020-01-01 RX ADMIN — AMIODARONE HYDROCHLORIDE 1 MG/MIN: 1.8 INJECTION, SOLUTION INTRAVENOUS at 05:04

## 2020-01-01 RX ADMIN — DEXTROSE: 10 SOLUTION INTRAVENOUS at 06:05

## 2020-01-01 RX ADMIN — INSULIN DETEMIR 7 UNITS: 100 INJECTION, SOLUTION SUBCUTANEOUS at 09:05

## 2020-01-01 RX ADMIN — CALCIUM GLUCONATE 2000 MG: 98 INJECTION, SOLUTION INTRAVENOUS at 05:05

## 2020-01-01 RX ADMIN — Medication 175 MCG/HR: at 06:05

## 2020-01-01 RX ADMIN — FAMOTIDINE 20 MG: 10 INJECTION INTRAVENOUS at 08:05

## 2020-01-01 RX ADMIN — CEFEPIME 2 G: 2 INJECTION, POWDER, FOR SOLUTION INTRAVENOUS at 10:05

## 2020-01-01 RX ADMIN — VANCOMYCIN HYDROCHLORIDE 1250 MG: 1.25 INJECTION, POWDER, LYOPHILIZED, FOR SOLUTION INTRAVENOUS at 08:05

## 2020-01-01 RX ADMIN — NOREPINEPHRINE BITARTRATE 0.54 MCG/KG/MIN: 1 INJECTION, SOLUTION, CONCENTRATE INTRAVENOUS at 04:05

## 2020-01-01 RX ADMIN — METOPROLOL SUCCINATE 200 MG: 50 TABLET, EXTENDED RELEASE ORAL at 03:04

## 2020-01-01 RX ADMIN — IPRATROPIUM BROMIDE AND ALBUTEROL SULFATE 3 ML: .5; 3 SOLUTION RESPIRATORY (INHALATION) at 12:05

## 2020-01-01 RX ADMIN — MAGNESIUM SULFATE IN WATER 2 G: 40 INJECTION, SOLUTION INTRAVENOUS at 11:05

## 2020-01-01 RX ADMIN — SODIUM CHLORIDE 200 MG: 9 INJECTION, SOLUTION INTRAVENOUS at 06:05

## 2020-01-01 RX ADMIN — CEFEPIME 2 G: 2 INJECTION, POWDER, FOR SOLUTION INTRAVENOUS at 11:05

## 2020-01-01 RX ADMIN — IPRATROPIUM BROMIDE AND ALBUTEROL SULFATE 3 ML: .5; 3 SOLUTION RESPIRATORY (INHALATION) at 03:05

## 2020-01-01 RX ADMIN — SODIUM BICARBONATE: 84 INJECTION, SOLUTION INTRAVENOUS at 07:05

## 2020-01-01 RX ADMIN — VASOPRESSIN 0.04 UNITS/MIN: 20 INJECTION INTRAVENOUS at 09:05

## 2020-01-01 RX ADMIN — INSULIN HUMAN 10 UNITS: 100 INJECTION, SOLUTION PARENTERAL at 05:05

## 2020-01-01 RX ADMIN — CEFEPIME 2 G: 2 INJECTION, POWDER, FOR SOLUTION INTRAVENOUS at 12:05

## 2020-01-01 RX ADMIN — PHYTONADIONE 10 MG: 10 INJECTION, EMULSION INTRAMUSCULAR; INTRAVENOUS; SUBCUTANEOUS at 03:05

## 2020-01-01 RX ADMIN — APIXABAN 5 MG: 2.5 TABLET, FILM COATED ORAL at 04:04

## 2020-01-01 RX ADMIN — FLUTICASONE FUROATE AND VILANTEROL TRIFENATATE 1 PUFF: 200; 25 POWDER RESPIRATORY (INHALATION) at 08:05

## 2020-01-01 RX ADMIN — NOREPINEPHRINE BITARTRATE: 1 INJECTION, SOLUTION, CONCENTRATE INTRAVENOUS at 01:05

## 2020-01-01 RX ADMIN — ONDANSETRON 4 MG: 2 INJECTION INTRAMUSCULAR; INTRAVENOUS at 01:04

## 2020-01-01 RX ADMIN — AMIODARONE HYDROCHLORIDE 200 MG: 200 TABLET ORAL at 06:05

## 2020-01-01 RX ADMIN — PANTOPRAZOLE SODIUM 40 MG: 40 INJECTION, POWDER, LYOPHILIZED, FOR SOLUTION INTRAVENOUS at 10:05

## 2020-01-01 RX ADMIN — INSULIN ASPART 10 UNITS: 100 INJECTION, SOLUTION INTRAVENOUS; SUBCUTANEOUS at 05:05

## 2020-01-01 RX ADMIN — DEXTROSE MONOHYDRATE 25 G: 25 INJECTION, SOLUTION INTRAVENOUS at 12:05

## 2020-01-01 RX ADMIN — HYDROCORTISONE SODIUM SUCCINATE 100 MG: 100 INJECTION, POWDER, FOR SOLUTION INTRAMUSCULAR; INTRAVENOUS at 09:05

## 2020-01-01 RX ADMIN — SODIUM CHLORIDE 500 ML: 0.9 INJECTION, SOLUTION INTRAVENOUS at 01:04

## 2020-01-01 RX ADMIN — VASOPRESSIN 0.04 UNITS/MIN: 20 INJECTION INTRAVENOUS at 10:05

## 2020-01-01 RX ADMIN — Medication 100 MCG/HR: at 02:05

## 2020-01-01 RX ADMIN — ATROPINE SULFATE 1 MG: 0.1 INJECTION PARENTERAL at 07:05

## 2020-01-01 RX ADMIN — APIXABAN 5 MG: 2.5 TABLET, FILM COATED ORAL at 08:05

## 2020-01-01 RX ADMIN — INSULIN DETEMIR 7 UNITS: 100 INJECTION, SOLUTION SUBCUTANEOUS at 08:04

## 2020-01-01 RX ADMIN — SODIUM BICARBONATE: 84 INJECTION, SOLUTION INTRAVENOUS at 06:05

## 2020-01-01 RX ADMIN — HYDROCORTISONE SODIUM SUCCINATE 100 MG: 100 INJECTION, POWDER, FOR SOLUTION INTRAMUSCULAR; INTRAVENOUS at 10:05

## 2020-01-01 RX ADMIN — APIXABAN 5 MG: 2.5 TABLET, FILM COATED ORAL at 08:04

## 2020-01-01 RX ADMIN — NOREPINEPHRINE BITARTRATE 2 MCG/KG/MIN: 1 INJECTION, SOLUTION, CONCENTRATE INTRAVENOUS at 02:05

## 2020-01-01 RX ADMIN — HEPARIN SODIUM 5000 UNITS: 5000 INJECTION, SOLUTION INTRAVENOUS; SUBCUTANEOUS at 05:05

## 2020-01-01 RX ADMIN — VASOPRESSIN 0.04 UNITS/MIN: 20 INJECTION INTRAVENOUS at 12:05

## 2020-01-01 RX ADMIN — AZITHROMYCIN MONOHYDRATE 500 MG: 500 INJECTION, POWDER, LYOPHILIZED, FOR SOLUTION INTRAVENOUS at 12:05

## 2020-01-01 RX ADMIN — HYDROCORTISONE SODIUM SUCCINATE 100 MG: 100 INJECTION, POWDER, FOR SOLUTION INTRAMUSCULAR; INTRAVENOUS at 08:05

## 2020-01-28 NOTE — PROGRESS NOTES
Subjective:     Sandra Oseguera is a 80 y.o. female with hypertension, hypercholesterolemia and diabetes mellitus type 2.  She has a healthy weight. She has rheumatoid arthritis involving knees, hands and back. In the summer of 2013 she developed progressive exertional dyspnea occasionally associated with mild chest pressure. She had an Echocardiogram that revealed findings consistent with hypertensive heart disease with a high LVEDP. She had low exercise tolerance on a Stress MPI but no defects. She was began on furosemide and after that she was breathing better. She had no exertional chest pain but mild to moderate exertional dyspnea if walking fast but no wheezing. Her heart rate was 70-80 bpm at rest and no longer raced with activities. As it appeared her exertional dyspnea was out of proportion to her cardiac findings she was referrred for PFTs that were done on 1/13/2017. They revealed small airway obstruction without significant response to inhalers. She saw Dr. Jarek Wade and was prescribed Anoro inhalers and a rescue inhaler. She also got enrolled in pulmonary rehabilitation and did 25 sessions. She was breathing better and thought her legs held her back more than the breathing. In late 8/2019 she noted palpitations and then slowly got more and more short of breath. She was seen by Dr. Mauricio Sood on 9/11/2019 and noted to be in atrial fibrillation with moderately fast VRR. She was admitted. She was diuresed  And anticoagulated. Rate was controlled with metoprolol. On 9/12/2019 she had an Echo that revealed normal left ventricular size with mild systolic dysfunction. The EF was 45%. There was moderate LVH and the LA was moderately dilated. There was moderate MR and severe TR with the SPAP being elevated to 67 mmHg. On 9/13/2019 she underwent a CRISTELA guided CV. The DONATO had mild spontaneous echocardiographic contrast and there was moderate MR. She was converted to sinus with a 100 J shock. She was  feeling better in sinus rhythm. She has not had any clinical recurrence. No palpitations or weak spells. Feeling well overall.      Congestive Heart Failure   Presents for follow-up visit. The disease course has been stable. Associated symptoms include shortness of breath. Pertinent negatives include no abdominal pain, chest pain, chest pressure, claudication, edema, fatigue, muscle weakness, near-syncope, nocturia, orthopnea, palpitations, paroxysmal nocturnal dyspnea or unexpected weight change. The symptoms have been stable.   Atrial Fibrillation   Presents for follow-up visit. Symptoms include hypertension and shortness of breath. Symptoms are negative for bradycardia, chest pain, dizziness, hypotension, palpitations, syncope, tachycardia and weakness. The symptoms have been stable. Past medical history includes atrial fibrillation, CHF and hyperlipidemia.   Shortness of Breath   This is a chronic problem. The current episode started more than 1 year ago. The problem occurs daily. Pertinent negatives include no abdominal pain, chest pain, claudication, coryza, ear pain, fever, headaches, hemoptysis, leg pain (entire legs), leg swelling, neck pain, orthopnea, PND, rash, rhinorrhea, sore throat, sputum production, swollen glands, syncope, vomiting or wheezing.   Hypertension   This is a chronic problem. The current episode started more than 1 year ago. The problem is unchanged. The problem is controlled (usually 120-130/60-70 mmHg at home). Associated symptoms include shortness of breath. Pertinent negatives include no anxiety, blurred vision, chest pain, headaches, malaise/fatigue, neck pain, orthopnea, palpitations, peripheral edema, PND or sweats. Identifiable causes of hypertension include chronic renal disease.   Hyperlipidemia   This is a chronic problem. The current episode started more than 1 year ago. The problem is controlled. Recent lipid tests were reviewed and are normal. Exacerbating diseases include  chronic renal disease and diabetes. She has no history of hypothyroidism, liver disease, obesity or nephrotic syndrome. Associated symptoms include shortness of breath. Pertinent negatives include no chest pain, focal sensory loss, focal weakness, leg pain (entire legs) or myalgias.       Review of Systems   Constitution: Negative for fatigue, fever, malaise/fatigue, unexpected weight change, weight gain and weight loss.   HENT: Negative for ear pain, nosebleeds, rhinorrhea and sore throat.    Eyes: Negative for blurred vision, pain and redness.   Cardiovascular: Positive for dyspnea on exertion. Negative for chest pain, claudication, irregular heartbeat, leg swelling, near-syncope, orthopnea, palpitations, paroxysmal nocturnal dyspnea and syncope.   Respiratory: Positive for shortness of breath. Negative for cough, hemoptysis, sputum production and wheezing.    Endocrine: Negative for cold intolerance and heat intolerance.   Hematologic/Lymphatic: Negative for bleeding problem. Does not bruise/bleed easily.   Skin: Negative for color change and rash.   Musculoskeletal: Positive for arthritis, back pain and joint pain. Negative for falls (8/2016: Fell going up steps), muscle weakness, myalgias and neck pain.   Gastrointestinal: Negative for abdominal pain, heartburn, hematemesis, hematochezia, hemorrhoids, jaundice, melena, nausea and vomiting.   Genitourinary: Negative for dysuria, hematuria, menorrhagia and nocturia.   Neurological: Negative for difficulty with concentration, dizziness, focal weakness, headaches, light-headedness, loss of balance, numbness, paresthesias, tremors, vertigo and weakness.   Psychiatric/Behavioral: Negative for altered mental status, depression and memory loss. The patient is not nervous/anxious.    Allergic/Immunologic: Negative for hives and persistent infections.       Current Outpatient Medications on File Prior to Visit   Medication Sig Dispense Refill    acetaminophen (TYLENOL)  "325 MG tablet Take 2 tablets (650 mg total) by mouth every 4 (four) hours as needed.  0    apixaban (ELIQUIS) 5 mg Tab Take 1 tablet (5 mg total) by mouth 2 (two) times daily. 180 tablet 3    atorvastatin (LIPITOR) 20 MG tablet Take 1 tablet (20 mg total) by mouth once daily. 90 tablet 3    benazepril (LOTENSIN) 20 MG tablet Take 1 tablet (20 mg total) by mouth once daily. 90 tablet 3    benazepril (LOTENSIN) 20 MG tablet TAKE 1 TABLET(20 MG) BY MOUTH EVERY DAY 90 tablet 0    fish oil-omega-3 fatty acids 300-1,000 mg capsule Take 1 capsule by mouth once daily.      folic acid (FOLVITE) 1 MG tablet Take 1 tablet by mouth everyday  3    furosemide (LASIX) 20 MG tablet TAKE 1 TABLET(20 MG) BY MOUTH EVERY DAY 90 tablet 0    furosemide (LASIX) 40 MG tablet Take 1 tablet (40 mg total) by mouth once daily. 120 tablet 3    gabapentin (NEURONTIN) 300 MG capsule Take 1 capsule by mouth every morning and at lunch, then 2 capsules every evening  2    insulin (BASAGLAR KWIKPEN U-100 INSULIN) glargine 100 units/mL (3mL) SubQ pen Inject 12 Units into the skin once daily. Take 15 units every 5th day.  0    methotrexate 2.5 MG Tab Take 10 mg by mouth every Monday.       metoprolol succinate (TOPROL-XL) 200 MG 24 hr tablet Take 1 tablet (200 mg total) by mouth once daily. 90 tablet 3    oxymetazoline (AFRIN, OXYMETAZOLINE,) 0.05 % nasal spray 2 sprays by Nasal route 2 (two) times daily as needed (bleeding).  0    TRADJENTA 5 mg Tab tablet Take 5 mg by mouth once daily.       UMECLIDINIUM BRM/VILANTEROL TR (ANORO ELLIPTA INHL) Inhale 1 puff into the lungs once daily.       VENTOLIN HFA 90 mcg/actuation inhaler Inhale 2 puffs into the lungs 2 (two) times daily as needed for Wheezing or Shortness of Breath.       vitamin D 1000 units Tab Take 1,000 Units by mouth once daily.        No current facility-administered medications on file prior to visit.        /63   Pulse 68   Ht 5' 3" (1.6 m)   Wt 59.4 kg (131 lb) "   LMP  (LMP Unknown)   BMI 23.21 kg/m²       Objective:     Physical Exam   Constitutional: She is oriented to person, place, and time. She appears well-developed and well-nourished.  Non-toxic appearance. She does not appear ill. No distress.   HENT:   Head: Normocephalic and atraumatic.   Nose: Nose normal.   Mouth/Throat: No oropharyngeal exudate.   Eyes: Right eye exhibits no discharge. Left eye exhibits no discharge. Right conjunctiva is not injected. Left conjunctiva is not injected. Right pupil is round. Left pupil is round. Pupils are equal.   Neck: Neck supple. No JVD present. Carotid bruit is not present. No tracheal deviation present.   Cardiovascular: Normal rate, regular rhythm, S1 normal and S2 normal. PMI is not displaced. Exam reveals gallop and S4. Exam reveals no S3.   Murmur heard.   Midsystolic murmur is present with a grade of 2/6 at the upper right sternal border.  Pulses:       Radial pulses are 2+ on the right side, and 2+ on the left side.        Dorsalis pedis pulses are 1+ on the right side, and 1+ on the left side.        Posterior tibial pulses are 1+ on the right side, and 1+ on the left side.   Pulmonary/Chest: Effort normal and breath sounds normal.   Abdominal: Soft. Normal appearance. There is no hepatosplenomegaly. There is no tenderness.   Musculoskeletal:        Right ankle: She exhibits swelling. She exhibits no ecchymosis and no deformity.        Left ankle: She exhibits swelling. She exhibits no ecchymosis and no deformity.   Lymphadenopathy:        Head (right side): No submandibular adenopathy present.        Head (left side): No submandibular adenopathy present.     She has no cervical adenopathy.   Neurological: She is alert and oriented to person, place, and time. She is not disoriented. No cranial nerve deficit or sensory deficit.   Skin: Skin is warm, dry and intact. No rash noted. She is not diaphoretic.   Psychiatric: She has a normal mood and affect. Her speech is  normal and behavior is normal. Judgment and thought content normal. Cognition and memory are normal.       Assessment:      1. Heart failure, diastolic, chronic    2. Paroxysmal atrial fibrillation    3. Chronic anticoagulation    4. Right bundle branch block    5. Shortness of breath    6. Essential hypertension    7. Hypercholesterolemia    8. Type 2 diabetes mellitus with stage 2 chronic kidney disease, without long-term current use of insulin    9. Chronic kidney disease, stage II (mild)    10. Rheumatoid arthritis involving both hands with positive rheumatoid factor        Plan:     1. Heart Failure, Diastolic, Chronic              1/10/2014: CXR: WNL.              2/13/2014: Echo: Normal LV size and function. Mild LVH. Moderate diastolic dysfunction. Elevated LVEDP. Mildly dilated LA.              2/13/2014: Stress MPI: 3:30 min. No CP. Low exercise tolerance. ECG negative. MPI negative.              Suspect exertional dyspnea related to diastolic dysfunction and high LVEDP as well as her pulmonary disease.              Significantly less short of breath since on furosemide 20 mg Q24.              As heart rate appeared to go up with exertion to a level where she felt palpitations increased dose of metoprolol to 100 mg Q24.              9/11/2019: Presents in HF probably due to atrial fibrillation.              9/12/2019: Echo: Normal left ventricular size with mild systolic dysfunction. EF 45%. Moderate LVH. Moderately dilated LA. Moderate MR. Severe TR. SPAP 67 mmHg.               On benazepril 20 mg Q24 and furosemide 40 mg Q24.              Well compensated.     2. Atrial Fibrillation              8/2019: Suspect onset of persistent atrial fibrillation with moderately fast VRR.              [id number] ([id number]).              On metoprolol 100 mg - increased to 200 mg Q24.              Anticoagulation with apixiban 5 mg Q12.              9/13/2019: [facility]: CRISTELA & CV: DONATO: Mild spontaneous echocardiographic  contrast. Moderate MR.  J to sinus rhythm.               On metoprolol 200 mg Q24.   No clinical recurrence.     3. Chronic Anticoagulation              8/2019: Suspect onset of persistent atrial fibrillation with moderately fast VRR.              [id number] ([id number]).              On apixiban 5 mg Q12.   No aspirin or NSAID.     4. Right Bundle Branch Block              2018: Diagnosed with RBBB.     5. Shortness of Breath              8/2013: Began experience exertional dyspnea.              1/13/2017: PFTs: Small airway obstruction without significant response to inhalers.              On Anoro inhaler and Ventoline and was in pulmonary rehabilitation program.              Stable.              Dr. Jarek Wade.     6. Hypertension              1992: Diagnosed.              On metoprolol 200 mg Q24, benazepril 20 mg Q24 and furosemide 40 mg Q24.              Keeping log at home.              Well controlled.                            7. Hypercholesterolemia              1992: Diagnosed.              On atorvastatin 20 mg Q24.              Tells me well controlled.     8. Diabetes Mellitus, Type 2              1992: Diagnosed. Complications: CKD2. Medications: Insulin and oral agent.              On insulin and Trajenta.              No aspirin as anticoagulated.              Well controlled.     9. Chronic Kidney Disease, Stage 2              3/3/2015: BUN/crea 15/1.0. CrCl 65.              Stable.     10. Rheumatoid Arthritis.              2006: Diagnosed. Involvement: Hands, knees and back.              On methotrexate and folic acid.              Dr. Ezequiel Figueroa Jr..     11. Primary Care              Dr. America Potts.    12. Pre Operative Cardiovascular Evaluation   3/10/2020: Plan is eye surgery.   May hold apixiban for 36 hours before surgery.   Low cardiac risk.   Dr. Ja Rao.    F/u 3 months.    Garrett Calderon M.D.      Copy:    Dr. Ja Rao.

## 2020-04-30 PROBLEM — I45.10 RIGHT BUNDLE BRANCH BLOCK: Chronic | Status: ACTIVE | Noted: 2018-09-12

## 2020-04-30 PROBLEM — K80.00 CALCULUS OF GALLBLADDER WITH ACUTE CHOLECYSTITIS WITHOUT OBSTRUCTION: Status: RESOLVED | Noted: 2017-05-15 | Resolved: 2020-01-01

## 2020-04-30 PROBLEM — N17.9 AKI (ACUTE KIDNEY INJURY): Chronic | Status: ACTIVE | Noted: 2020-01-01

## 2020-04-30 PROBLEM — Z79.01 CHRONIC ANTICOAGULATION: Chronic | Status: ACTIVE | Noted: 2019-01-01

## 2020-04-30 PROBLEM — Z79.899 HIGH RISK MEDICATION USE: Status: ACTIVE | Noted: 2020-01-01

## 2020-04-30 NOTE — H&P
"[facility] Hospital Medicine  History & Physical    Patient Name: Sandra Oseguera  MRN: [id number]  Admission Date: 4/30/2020  Attending Physician: RACHEL Guerra MD  Primary Care Provider: America Potts MD    Patient information was obtained from patient, past medical records and ER records.     Subjective:     Principal Problem:Atrial fibrillation with rapid ventricular response    Chief Complaint:   Chief Complaint   Patient presents with    Shortness of Breath     x3 days. EMS reports " dry heaving episode while walking to bathroom" . EMS reports A.fib w. RVR        HPI: Ms. Oseguera is a 80/F with PMH A-fib (s/p DCCV 09/2019, on apixaban), HFpEF (2D Echo 09/2019 EF 60%), HTN, DMII, RA who presented to ED 04/30 with a three day history of worsening SOB. She reports she has dyspnea on exertion at baseline but feels that it has worsened over the several days prior to admission. Denies orthopnea. Reports associated palpitations in addition, and some mild dry cough. She has had some abdominal cramping and loose stool and poor PO intake over the past several days. She denies fever, chills, myalgias or sore throat. She did not take her medications the morning of presentation, but has been taking them consistently beforehand. Upon presentation to ED she was found to have atrial fibrillation with RVR with rate to the 130s-140s. She received metoprolol succinate 200mg PO and metoprolol 5mg IV x 3 without significant response. Lab evaluation was notable for DUNIA, elevated anion gap, and elevated BNP. CXR demonstrated some pulmonary edema. Hospital medicine was contacted for admission.    Past Medical History:   Diagnosis Date    A-fib september    Chronic kidney disease, stage II (mild) 2/3/2014    Diabetes mellitus, type 2 1992    GERD (gastroesophageal reflux disease)     Heart failure, diastolic, chronic 3/3/2014    History of bronchitis     Hypercholesterolemia     Hypertension, " malignant 1992    PONV (postoperative nausea and vomiting)     Rheumatoid arthritis 2006    Right bundle branch block 9/12/2018    2018: Diagnosed with RBBB.     Past Surgical History:   Procedure Laterality Date    APPENDECTOMY      CHOLECYSTECTOMY      HYSTERECTOMY  1976    TOE SURGERY  2015    TREATMENT OF CARDIAC ARRHYTHMIA  9/13/2019    Procedure: Cardioversion or Defibrillation;  Surgeon: Garrett Calderon MD;  Location: [facility] CATH LAB;  Service: Cardiology;;     Review of patient's allergies indicates:   Allergen Reactions    Codeine Nausea And Vomiting     States can take oxycodone and hydrocodone    Sulfa (sulfonamide antibiotics) Nausea And Vomiting     No current facility-administered medications on file prior to encounter.      Current Outpatient Medications on File Prior to Encounter   Medication Sig    acetaminophen (TYLENOL) 325 MG tablet Take 2 tablets (650 mg total) by mouth every 4 (four) hours as needed.    apixaban (ELIQUIS) 5 mg Tab Take 1 tablet (5 mg total) by mouth 2 (two) times daily.    atorvastatin (LIPITOR) 20 MG tablet Take 1 tablet (20 mg total) by mouth once daily.    benazepril (LOTENSIN) 20 MG tablet Take 1 tablet (20 mg total) by mouth once daily.    fish oil-omega-3 fatty acids 300-1,000 mg capsule Take 1 capsule by mouth once daily.    folic acid (FOLVITE) 1 MG tablet Take 1 tablet by mouth everyday    furosemide (LASIX) 40 MG tablet Take 1 tablet (40 mg total) by mouth once daily.    gabapentin (NEURONTIN) 300 MG capsule Take 1 capsule by mouth every morning and at lunch, then 2 capsules every evening    insulin (BASAGLAR KWIKPEN U-100 INSULIN) glargine 100 units/mL (3mL) SubQ pen Inject 12 Units into the skin once daily. Take 15 units every 5th day.    methotrexate 2.5 MG Tab Take 10 mg by mouth every Monday.     metoprolol succinate (TOPROL-XL) 200 MG 24 hr tablet Take 1 tablet (200 mg total) by mouth once daily.    oxymetazoline (AFRIN, OXYMETAZOLINE,) 0.05  % nasal spray 2 sprays by Nasal route 2 (two) times daily as needed (bleeding).    TRADJENTA 5 mg Tab tablet Take 5 mg by mouth once daily.     UMECLIDINIUM BRM/VILANTEROL TR (ANORO ELLIPTA INHL) Inhale 1 puff into the lungs once daily.     VENTOLIN HFA 90 mcg/actuation inhaler Inhale 2 puffs into the lungs 2 (two) times daily as needed for Wheezing or Shortness of Breath.     vitamin D 1000 units Tab Take 1,000 Units by mouth once daily.     [DISCONTINUED] benazepril (LOTENSIN) 20 MG tablet TAKE 1 TABLET(20 MG) BY MOUTH EVERY DAY    [DISCONTINUED] furosemide (LASIX) 20 MG tablet TAKE 1 TABLET(20 MG) BY MOUTH EVERY DAY     Family History     Problem Relation (Age of Onset)    Asthma Daughter    Diabetes type II Daughter    Heart attack Father    Hypertension Mother, Daughter    No Known Problems Son, Son    Stroke Mother        Tobacco Use    Smoking status: Never Smoker    Smokeless tobacco: Never Used   Substance and Sexual Activity    Alcohol use: No    Drug use: Never    Sexual activity: Not Currently     Review of Systems   Constitutional: Positive for fatigue. Negative for chills and fever.   HENT: Negative for sore throat and trouble swallowing.    Eyes: Negative for pain and visual disturbance.   Respiratory: Positive for shortness of breath. Negative for cough.    Cardiovascular: Positive for palpitations. Negative for chest pain.   Gastrointestinal: Negative for abdominal pain, nausea and vomiting.   Genitourinary: Negative for difficulty urinating and dysuria.   Musculoskeletal: Negative for arthralgias and myalgias.   Skin: Negative for rash and wound.   Neurological: Negative for weakness and numbness.     Objective:     Vital Signs (Most Recent):  Temp: 97.4 °F (36.3 °C) (04/30/20 1214)  Pulse: (!) 127 (04/30/20 1621)  Resp: 18 (04/30/20 1312)  BP: (!) 140/102 (04/30/20 1621)  SpO2: 100 % (04/30/20 1621) Vital Signs (24h Range):  Temp:  [97.4 °F (36.3 °C)] 97.4 °F (36.3 °C)  Pulse:   [117-134] 127  Resp:  [18-29] 18  SpO2:  [97 %-100 %] 100 %  BP: (127-156)/() 140/102     Weight: 59.4 kg (131 lb)  Body mass index is 23.21 kg/m².    Physical Exam   Constitutional: She is oriented to person, place, and time. She appears well-developed. She appears distressed.   HENT:   Head: Normocephalic and atraumatic.   Cardiovascular: Intact distal pulses. An irregularly irregular rhythm present. Tachycardia present.   Pulmonary/Chest: Tachypnea noted. She has rales.   Abdominal: Soft. There is no tenderness.   Musculoskeletal: She exhibits edema. She exhibits no tenderness.   Neurological: She is alert and oriented to person, place, and time.   Skin: She is diaphoretic.   Cool to touch.   Nursing note and vitals reviewed.    Significant Labs:   CBC:  Recent Labs   Lab 04/30/20  1242   WBC 8.69   HGB 11.5*   HCT 36.2*      GRAN 76.4*  6.6   LYMPH 12.4*  1.1   MONO 10.0  0.9   EOS 0.0   BASO 0.02      CMP:  Recent Labs   Lab 04/30/20  1242      K 4.9      CO2 19*   BUN 26*   CREATININE 1.7*   *   CALCIUM 9.9   ALKPHOS 106   AST 53*   ALT 42   BILITOT 1.6*   PROT 8.0   ALBUMIN 3.8   ANIONGAP 18*      Significant Imaging:  CXR 04/30/20:  Cardiac silhouette is mildly enlarged. There is bilateral pulmonary vascular congestion and interstitial and alveolar lung opacity suspicious for pulmonary edema. Bibasilar consolidation is suggestive of pleural fluid and atelectasis and or pneumonia.  Visualized bones grossly unremarkable.    Assessment/Plan:     * Atrial fibrillation with rapid ventricular response  - A-Fib RVR with rate to 120s-140s. Skin cool to touch at time of exam. BP stable.  - Received metoprolol 5mg IV x 3 and metoprolol 200mg PO in ED without significant effect yet. Discussed with cardiology; start amiodarone gtt, will consider DCCV.  - Continuing apixaban 5mg PO BID.  - Cardiology consult.    DUNIA (acute kidney injury)  - DUNIA likely secondary to hypoperfusion in  setting of CHF exacerbation / AFib-RVR.  - Fluid challenged in ED, but does not appear to have improved perfusion.  - Close monitoring in setting of diuresis.    Chronic anticoagulation  - As under A-fib RVR.    Rheumatoid arthritis  - Hold methotrexate for time being.    Hypercholesterolemia  - Continuing atorvastatin 20mg PO daily.    Acute on chronic diastolic heart failure  - Acute on chronic diastolic heart failure with A-fib RVR.  - CXR with pulmonary edema, BNP elevated.  - Repeat 2D Echo.  - Gave furosemide 60mg IV once in ED; continue diuresis with furosemide 60mg IV BID.  - Strict intake/output, daily weights.    CKD (chronic kidney disease) stage 3, GFR 30-59 ml/min  - As under DUNIA.    Type 2 diabetes mellitus, with long-term current use of insulin  - On insulin detemir 15U subQ daily, linagliptin 5mg PO daily at home.  - Hold home linagliptin; continue insulin detemir at 7U subQ qHS, start low-dose sliding scale insulin aspart 0-5U subQ TIDWM PRN.    Hypertension  - Continuing metoprolol as above. Hold benazepril 20mg PO daily given DUNIA.    Shortness of breath  - On umeclidinium/vilanterol, albuterol at home.  - Continuing fluticasone-vilanterol 200-25mcg inhaled daily, albuterol-ipratropium 2.5-0.5mg inhaled q4hr PRN while inpatient.    VTE Risk Mitigation (From admission, onward)    None             RACHEL Guerra MD  Department of Hospital Medicine   [facility]

## 2020-04-30 NOTE — ED NOTES
Pt reports increased SOB with increased work of breathing. 's and 140's. EKG obtained and provider notified.

## 2020-04-30 NOTE — ASSESSMENT & PLAN NOTE
- On insulin detemir 15U subQ daily, linagliptin 5mg PO daily at home.  - Hold home linagliptin; continue insulin detemir at 7U subQ qHS, start low-dose sliding scale insulin aspart 0-5U subQ TIDWM PRN.   right ankle yes/right ankle

## 2020-04-30 NOTE — ASSESSMENT & PLAN NOTE
- On umeclidinium/vilanterol, albuterol at home.  - Continuing fluticasone-vilanterol 200-25mcg inhaled daily, albuterol-ipratropium 2.5-0.5mg inhaled q4hr PRN while inpatient.

## 2020-04-30 NOTE — ED NOTES
Received permission from pt to give an update to daughter. Called daughter Dejuan and provided her with an update. Explained that her heart rate is elevated, and that MD Villarreal, will try and get in contact with her cardiologist and try to control her heart rate. Continue to monitor pt and design a plan to see if she needs to be admitted. Will update daughter when we have further information.    Dejuan (Daughter): [phone]

## 2020-04-30 NOTE — ASSESSMENT & PLAN NOTE
- A-Fib RVR with rate to 120s-140s. Skin cool to touch at time of exam. BP stable.  - Received metoprolol 5mg IV x 3 and metoprolol 200mg PO in ED without significant effect yet. Discussed with cardiology; start amiodarone gtt, will consider DCCV.  - Continuing apixaban 5mg PO BID.  - Cardiology consult.

## 2020-04-30 NOTE — CONSULTS
[facility]  Cardiology  Consult Note    Patient Name: Sandra Oseguera  MRN: [id number]  Admission Date: 4/30/2020  Hospital Length of Stay: 0 days  Code Status: Full Code   Attending Provider: MOSES Guerra MD   Consulting Provider: Garrett Calderon MD  Primary Care Physician: America Potts MD  Principal Problem:Atrial fibrillation with rapid ventricular response    Patient information was obtained from patient, past medical records and ER records.     Inpatient consult to Cardiology  Consult performed by: Garrett Calderon MD  Consult ordered by: MOSES Guerra MD  Reason for consult: Atrial fibrillation & heart failure        Subjective:     Chief Complaint:  Shortness of breath and palpitations     HPI:     Sandra Oseguera is a 80 y.o.female with hypertension, hypercholesterolemia and diabetes mellitus type 2. She has a healthy weight. She has rheumatoid arthritis involving knees, hands and back. In the summer of 2013 she developed progressive exertional dyspnea occasionally associated with mild chest pressure. She had an Echocardiogram that revealed findings consistent with hypertensive heart disease with a high LVEDP. She had low exercise tolerance on a Stress MPI but no defects. She was began on furosemide and she was breathing easier. There was no wheezing. She had no exertional chest pain but mild to moderate exertional dyspnea if walking fast. Her heart rate was 70-80 bpm at rest and no longer raced with activities. As it appeared her exertional dyspnea was out of proportion to her cardiac findings she was referrred for PFTs that were done on 1/13/2017. They revealed small airway obstruction without significant response to inhalers. She saw Dr. Jarek Wade and was prescribed Anoro inhalers and a rescue inhaler. She also got enrolled in pulmonary rehabilitation and did 25 sessions. She was breathing better and thought her legs held her back more than her breathing. In  late 8/2019 she noted palpitations and then slowly got more and more short of breath. She was seen by Dr. Mauricio Sood on 9/11/2019 and was noted to be in atrial fibrillation with moderately fast VRR. She was admitted, diuresed and anticoagulated. The VRR was controlled with metoprolol. On 9/12/2019 she had an Echo that revealed normal left ventricular size with mild systolic dysfunction. The EF was 45%. There was moderate LVH and the LA was moderately dilated. There was moderate MR and severe TR with the SPAP being elevated to 67 mmHg. On 9/13/2019 she underwent a CRISTELA guided CV. The DONATO had mild spontaneous echocardiographic contrast and there was moderate MR. She was converted to sinus with a 100 J shock. She was feeling better in sinus rhythm. She has not had any clinical recurrence until recently. On about 4/27/2020 she noted that she was short of breath with minimal activities. Her dyspnea got worse and she noted that her heart was racing with minimal activities. She was advised to go to the ER and was in atrial fibrillation with fast VRR and in heart failure. She was admitted.    Past Medical History:   Diagnosis Date    A-fib september    Chronic kidney disease, stage II (mild) 2/3/2014    Diabetes mellitus, type 2 1992    GERD (gastroesophageal reflux disease)     Heart failure, diastolic, chronic 3/3/2014    History of bronchitis     Hypercholesterolemia     Hypertension, malignant 1992    PONV (postoperative nausea and vomiting)     Rheumatoid arthritis 2006    Right bundle branch block 9/12/2018    2018: Diagnosed with RBBB.       Past Surgical History:   Procedure Laterality Date    APPENDECTOMY      CHOLECYSTECTOMY      HYSTERECTOMY  1976    TOE SURGERY  2015    TREATMENT OF CARDIAC ARRHYTHMIA  9/13/2019    Procedure: Cardioversion or Defibrillation;  Surgeon: Garrett Calderon MD;  Location: [facility] CATH LAB;  Service: Cardiology;;       Review of patient's allergies indicates:   Allergen  Reactions    Codeine Nausea And Vomiting     States can take oxycodone and hydrocodone    Sulfa (sulfonamide antibiotics) Nausea And Vomiting       No current facility-administered medications on file prior to encounter.      Current Outpatient Medications on File Prior to Encounter   Medication Sig    acetaminophen (TYLENOL) 325 MG tablet Take 2 tablets (650 mg total) by mouth every 4 (four) hours as needed.    apixaban (ELIQUIS) 5 mg Tab Take 1 tablet (5 mg total) by mouth 2 (two) times daily.    atorvastatin (LIPITOR) 20 MG tablet Take 1 tablet (20 mg total) by mouth once daily.    benazepril (LOTENSIN) 20 MG tablet Take 1 tablet (20 mg total) by mouth once daily.    fish oil-omega-3 fatty acids 300-1,000 mg capsule Take 1 capsule by mouth once daily.    folic acid (FOLVITE) 1 MG tablet Take 1 tablet by mouth everyday    furosemide (LASIX) 40 MG tablet Take 1 tablet (40 mg total) by mouth once daily.    gabapentin (NEURONTIN) 300 MG capsule Take 1 capsule by mouth every morning and at lunch, then 2 capsules every evening    insulin (BASAGLAR KWIKPEN U-100 INSULIN) glargine 100 units/mL (3mL) SubQ pen Inject 12 Units into the skin once daily. Take 15 units every 5th day.    methotrexate 2.5 MG Tab Take 10 mg by mouth every Monday.     metoprolol succinate (TOPROL-XL) 200 MG 24 hr tablet Take 1 tablet (200 mg total) by mouth once daily.    oxymetazoline (AFRIN, OXYMETAZOLINE,) 0.05 % nasal spray 2 sprays by Nasal route 2 (two) times daily as needed (bleeding).    TRADJENTA 5 mg Tab tablet Take 5 mg by mouth once daily.     UMECLIDINIUM BRM/VILANTEROL TR (ANORO ELLIPTA INHL) Inhale 1 puff into the lungs once daily.     VENTOLIN HFA 90 mcg/actuation inhaler Inhale 2 puffs into the lungs 2 (two) times daily as needed for Wheezing or Shortness of Breath.     vitamin D 1000 units Tab Take 1,000 Units by mouth once daily.     [DISCONTINUED] benazepril (LOTENSIN) 20 MG tablet TAKE 1 TABLET(20 MG) BY  MOUTH EVERY DAY    [DISCONTINUED] furosemide (LASIX) 20 MG tablet TAKE 1 TABLET(20 MG) BY MOUTH EVERY DAY     Family History     Problem Relation (Age of Onset)    Asthma Daughter    Diabetes type II Daughter    Heart attack Father    Hypertension Mother, Daughter    No Known Problems Son, Son    Stroke Mother        Tobacco Use    Smoking status: Never Smoker    Smokeless tobacco: Never Used   Substance and Sexual Activity    Alcohol use: No    Drug use: Never    Sexual activity: Not Currently     Review of Systems   Constitution: Positive for malaise/fatigue. Negative for chills and fever.   HENT: Negative for nosebleeds.    Eyes: Negative for double vision, vision loss in left eye and vision loss in right eye.   Cardiovascular: Positive for dyspnea on exertion, leg swelling, orthopnea and palpitations. Negative for chest pain, claudication, irregular heartbeat, near-syncope, paroxysmal nocturnal dyspnea and syncope.   Respiratory: Positive for shortness of breath. Negative for cough, hemoptysis and wheezing.    Endocrine: Negative for cold intolerance and heat intolerance.   Hematologic/Lymphatic: Negative for bleeding problem. Does not bruise/bleed easily.   Skin: Negative for color change and rash.   Musculoskeletal: Negative for back pain, falls, muscle weakness and myalgias.   Gastrointestinal: Positive for nausea and vomiting. Negative for heartburn, hematemesis, hematochezia, hemorrhoids, jaundice and melena.   Genitourinary: Negative for dysuria and hematuria.   Neurological: Positive for weakness. Negative for dizziness, focal weakness, headaches, light-headedness, loss of balance, numbness and vertigo.   Psychiatric/Behavioral: Negative for altered mental status, depression and memory loss. The patient is not nervous/anxious.    Allergic/Immunologic: Negative for hives and persistent infections.     Objective:     Vital Signs (Most Recent):  Temp: 97.4 °F (36.3 °C) (04/30/20 1214)  Pulse: 95  (04/30/20 1801)  Resp: (!) 24 (04/30/20 1801)  BP: 132/86 (04/30/20 1801)  SpO2: 98 % (04/30/20 1801) Vital Signs (24h Range):  Temp:  [97.4 °F (36.3 °C)] 97.4 °F (36.3 °C)  Pulse:  [] 95  Resp:  [18-29] 24  SpO2:  [97 %-100 %] 98 %  BP: (120-156)/() 132/86     Weight: 59.4 kg (131 lb)  Body mass index is 23.21 kg/m².    SpO2: 98 %         Intake/Output Summary (Last 24 hours) at 4/30/2020 1852  Last data filed at 4/30/2020 1512  Gross per 24 hour   Intake 500 ml   Output --   Net 500 ml       Lines/Drains/Airways     Peripheral Intravenous Line                 Peripheral IV - Single Lumen 04/30/20 1236 20 G Right Antecubital less than 1 day         Peripheral IV - Single Lumen 04/30/20 1727 20 G Left Forearm less than 1 day                Physical Exam   Constitutional: She is oriented to person, place, and time. She appears well-developed and well-nourished.  Non-toxic appearance. She appears ill. She appears distressed.   HENT:   Head: Normocephalic and atraumatic.   Nose: Nose normal.   Eyes: Right eye exhibits no discharge. Left eye exhibits no discharge. Right conjunctiva is not injected. Left conjunctiva is not injected. Right pupil is round. Left pupil is round. Pupils are equal.   Neck: Neck supple. No JVD present. Carotid bruit is not present. No thyromegaly present.   Cardiovascular: S1 normal and S2 normal. An irregularly irregular rhythm present.  No extrasystoles are present. Tachycardia present. PMI is not displaced. Exam reveals gallop.   Murmur heard.  High-pitched blowing holosystolic murmur is present with a grade of 2/6 at the apex.  Pulses:       Radial pulses are 2+ on the right side, and 2+ on the left side.        Femoral pulses are 2+ on the right side, and 2+ on the left side.       Dorsalis pedis pulses are 2+ on the right side, and 2+ on the left side.        Posterior tibial pulses are 2+ on the right side, and 2+ on the left side.   Pulmonary/Chest: Effort normal. She has  rales in the right lower field and the left lower field.   Abdominal: Soft. Normal appearance. There is no hepatosplenomegaly. There is no tenderness.   Musculoskeletal:        Right ankle: She exhibits no swelling, no ecchymosis and no deformity.        Left ankle: She exhibits no swelling, no ecchymosis and no deformity.   Lymphadenopathy:        Head (right side): No submandibular adenopathy present.        Head (left side): No submandibular adenopathy present.     She has no cervical adenopathy.   Neurological: She is alert and oriented to person, place, and time. She is not disoriented. No cranial nerve deficit.   Skin: Skin is warm, dry and intact. No rash noted. She is not diaphoretic.   Psychiatric: She has a normal mood and affect. Her speech is normal and behavior is normal. Judgment and thought content normal. Cognition and memory are normal.     Current Medications:     apixaban  5 mg Oral BID    [START ON 5/1/2020] atorvastatin  20 mg Oral Daily    [START ON 5/1/2020] fluticasone furoate-vilanteroL  1 puff Inhalation Daily    [START ON 5/1/2020] furosemide (LASIX) IV  60 mg Intravenous BID    heparin (porcine)  5,000 Units Subcutaneous Q8H    insulin detemir U-100  7 Units Subcutaneous QHS    [START ON 5/1/2020] metoprolol succinate  200 mg Oral Daily     Current Laboratory Results:    Recent Results (from the past 24 hour(s))   CBC auto differential    Collection Time: 04/30/20 12:42 PM   Result Value Ref Range    WBC 8.69 3.90 - 12.70 K/uL    RBC 3.64 (L) 4.00 - 5.40 M/uL    Hemoglobin 11.5 (L) 12.0 - 16.0 g/dL    Hematocrit 36.2 (L) 37.0 - 48.5 %    Mean Corpuscular Volume 100 (H) 82 - 98 fL    Mean Corpuscular Hemoglobin 31.6 (H) 27.0 - 31.0 pg    Mean Corpuscular Hemoglobin Conc 31.8 (L) 32.0 - 36.0 g/dL    RDW 16.7 (H) 11.5 - 14.5 %    Platelets 154 150 - 350 K/uL    MPV 14.7 (H) 9.2 - 12.9 fL    Immature Granulocytes 0.8 (H) 0.0 - 0.5 %    Gran # (ANC) 6.6 1.8 - 7.7 K/uL    Immature Grans  (Abs) 0.07 (H) 0.00 - 0.04 K/uL    Lymph # 1.1 1.0 - 4.8 K/uL    Mono # 0.9 0.3 - 1.0 K/uL    Eos # 0.0 0.0 - 0.5 K/uL    Baso # 0.02 0.00 - 0.20 K/uL    nRBC 0 0 /100 WBC    Gran% 76.4 (H) 38.0 - 73.0 %    Lymph% 12.4 (L) 18.0 - 48.0 %    Mono% 10.0 4.0 - 15.0 %    Eosinophil% 0.2 0.0 - 8.0 %    Basophil% 0.2 0.0 - 1.9 %    Differential Method Automated    Comprehensive metabolic panel    Collection Time: 04/30/20 12:42 PM   Result Value Ref Range    Sodium 141 136 - 145 mmol/L    Potassium 4.9 3.5 - 5.1 mmol/L    Chloride 104 95 - 110 mmol/L    CO2 19 (L) 23 - 29 mmol/L    Glucose 207 (H) 70 - 110 mg/dL    BUN, Bld 26 (H) 8 - 23 mg/dL    Creatinine 1.7 (H) 0.5 - 1.4 mg/dL    Calcium 9.9 8.7 - 10.5 mg/dL    Total Protein 8.0 6.0 - 8.4 g/dL    Albumin 3.8 3.5 - 5.2 g/dL    Total Bilirubin 1.6 (H) 0.1 - 1.0 mg/dL    Alkaline Phosphatase 106 55 - 135 U/L    AST 53 (H) 10 - 40 U/L    ALT 42 10 - 44 U/L    Anion Gap 18 (H) 8 - 16 mmol/L    eGFR if African American 33 (A) >60 mL/min/1.73 m^2    eGFR if non African American 28 (A) >60 mL/min/1.73 m^2   C-Reactive Protein    Collection Time: 04/30/20 12:42 PM   Result Value Ref Range    CRP 12.6 (H) 0.0 - 8.2 mg/L   CK    Collection Time: 04/30/20 12:42 PM   Result Value Ref Range    CPK 43 20 - 180 U/L   Troponin I    Collection Time: 04/30/20 12:42 PM   Result Value Ref Range    Troponin I 0.051 (H) 0.000 - 0.026 ng/mL   COVID-19 Rapid Screening    Collection Time: 04/30/20 12:42 PM   Result Value Ref Range    SARS-CoV-2 RNA, Amplification, Qual Negative Negative   Procalcitonin    Collection Time: 04/30/20 12:42 PM   Result Value Ref Range    Procalcitonin 0.03 <0.25 ng/mL   Brain Natriuretic Peptide    Collection Time: 04/30/20 12:42 PM   Result Value Ref Range     (H) 0 - 99 pg/mL   Magnesium    Collection Time: 04/30/20 12:42 PM   Result Value Ref Range    Magnesium 1.8 1.6 - 2.6 mg/dL     Current Imaging Results:    X-Ray Chest AP Portable   Final Result       Findings suggestive of CHF and pulmonary edema.         Electronically signed by: Leroy Romero Jr   Date:    04/30/2020   Time:    13:58        ECG: Atrial fibrillation with fast VRR.    Assessment and Plan:     Active Diagnoses:    Diagnosis Date Noted POA    PRINCIPAL PROBLEM:  Atrial fibrillation with rapid ventricular response [I48.91] 09/11/2019 Yes    Acute on chronic diastolic heart failure [I50.33] 03/03/2014 Yes    High risk medication use [Z79.899] 04/30/2020 Not Applicable    Chronic anticoagulation [Z79.01] 09/13/2019 Not Applicable     Chronic    Right bundle branch block [I45.10] 09/12/2018 Yes     Chronic    Shortness of breath [R06.02] 02/03/2014 Yes    Hypertension [I10] 02/03/2014 Yes     Chronic    Hypercholesterolemia [E78.00] 12/01/2014 Yes     Chronic    Type 2 diabetes mellitus, with long-term current use of insulin [E11.9, Z79.4] 02/03/2014 Not Applicable     Chronic    Rheumatoid arthritis [M06.9] 12/01/2014 Yes     Chronic    CKD (chronic kidney disease) stage 3, GFR 30-59 ml/min [N18.3] 02/03/2014 Yes     Chronic    DUNIA (acute kidney injury) [N17.9] 04/30/2020 Yes     Chronic      Problems Resolved During this Admission:     Assessment and Plan:    1. Heart Failure, Diastolic, Acute on Chronic              1/10/2014: CXR: WNL.              2/13/2014: Echo: Normal LV size and function. Mild LVH. Moderate diastolic dysfunction. Elevated LVEDP. Mildly dilated LA.              2/13/2014: Stress MPI: 3:30 min. No CP. Low exercise tolerance. ECG negative. MPI negative.              Suspect exertional dyspnea related to diastolic dysfunction and high LVEDP as well as her pulmonary disease.              Significantly less short of breath since on furosemide 20 mg Q24.              As heart rate appeared to go up with exertion to a level where she felt palpitations increased dose of metoprolol to 100 mg Q24.              9/11/2019: Presents in HF probably due to atrial  fibrillation.              9/12/2019: Echo: Normal left ventricular size with mild systolic dysfunction. EF 45%. Moderate LVH. Moderately dilated LA. Moderate MR. Severe TR. SPAP 67 mmHg.               On benazepril 20 mg Q24 and furosemide 40 mg Q24.              4/30/2020: Presents in HF. Probably due to atrial fibrillation with fast VRR.     2. Atrial Fibrillation              8/2019: Suspect onset of persistent atrial fibrillation with moderately fast VRR.              [id number] ([id number]).              On metoprolol 100 mg - increased to 200 mg Q24.              Anticoagulation with apixiban 5 mg Q12.              9/13/2019: OMCBC: CRISTELA & CV: DONATO: Mild spontaneous echocardiographic contrast. Moderate MR.  J to sinus rhythm.               On metoprolol 200 mg Q24.              4/30/2020: Recurrence. Begin amiodarone.   5/1/2020: Plan CV.    3. High Risk Medication   4/30/2020: Began amiodarone iv and po.     4. Chronic Anticoagulation              8/2019: Suspect onset of persistent atrial fibrillation with moderately fast VRR.              [id number] ([id number]).              On apixiban 5 mg Q12.              No aspirin or NSAID.     5. Right Bundle Branch Block              2018: Diagnosed with RBBB.     6. Shortness of Breath              8/2013: Began experience exertional dyspnea.              1/13/2017: PFTs: Small airway obstruction without significant response to inhalers.              On Anoro inhaler and Ventoline and was in pulmonary rehabilitation program.              Stable.              Dr. Jarek Wade.     7. Hypertension              1992: Diagnosed.              On metoprolol 200 mg Q24, benazepril 20 mg Q24 and furosemide 40 mg Q24.              Keeping log at home.              Well controlled.                 8. Hypercholesterolemia              1992: Diagnosed.              On atorvastatin 20 mg Q24.              Tells me well controlled.     9. Diabetes Mellitus, Type 2               1992: Diagnosed. Complications: CKD2. Medications: Insulin and oral agent.              On insulin and Trajenta.              No aspirin as anticoagulated.              Well controlled.     10. Chronic Kidney Disease, Stage 2              3/3/2015: BUN/crea 15/1.0. CrCl 65.              Stable.     11. Rheumatoid Arthritis.              2006: Diagnosed. Involvement: Hands, knees and back.              On methotrexate and folic acid.              Dr. Ezequiel Figueroa Jr..     12. History of Eye Surgery              3/10/2020: Eye surgery.              Dr. Ja Rao.    13. Primary Care              Dr. America Potts.    The planned procedure was discussed in detail with the patient and the family members present. Risk, benefit and alternatives were reviewed. All questions answered. Consent was then signed.    If further questions or concerns arise the patient was encouraged to contact me prior to the planned procedure.     VTE Risk Mitigation (From admission, onward)         Ordered     heparin (porcine) injection 5,000 Units  Every 8 hours      04/30/20 1831     apixaban tablet 5 mg  2 times daily      04/30/20 1831     IP VTE HIGH RISK PATIENT  Once      04/30/20 1831     Place sequential compression device  Until discontinued      04/30/20 1831                Thank you for your consult.    I will follow-up with patient. Please contact us if you have any additional questions.    Garrett Calderon MD  Cardiology   [facility]

## 2020-04-30 NOTE — ASSESSMENT & PLAN NOTE
- Acute on chronic diastolic heart failure with A-fib RVR.  - CXR with pulmonary edema, BNP elevated.  - Repeat 2D Echo.  - Gave furosemide 60mg IV once in ED; continue diuresis with furosemide 60mg IV BID.  - Strict intake/output, daily weights.

## 2020-04-30 NOTE — ED NOTES
Pt increasingly SOB and tachypneic, RR 28, diaphoretic, awake and alert.  MD Charlie at bedside to assess pt.  Requesting RN administer 5 mg metoprolol IV, states he may order loading dose of amiodarone.

## 2020-04-30 NOTE — ED NOTES
Administered medication and hung IV fluids. Pt resting comfortably. Pt denies SOB, N/V and/or chest pain. Will continue to monitor and update as needed.

## 2020-04-30 NOTE — ED NOTES
Pt rounding complete.  Pain 0/10, pt still reporting SOB.  Restroom and comfort needs addressed.  Pt updated on plan of care.  Call light within reach.  Will continue to monitor.

## 2020-04-30 NOTE — ED NOTES
Pt states she is less SOB, no longer diaphoretic, aao x 4.  Tolerating 2LPM O2 via nasal canula.  RR 22-24.  Will continue to monitor.

## 2020-04-30 NOTE — ED NOTES
Pt rounding complete.  Pain 0/10, reports SOB lessened since being on Oxygen. O2 sat 100%, O2 changed to 1LPM.  Restroom and comfort needs addressed.  Pt updated on plan of care.  Call light within reach.  Will continue to monitor.

## 2020-04-30 NOTE — HPI
Ms. Oseguera is a 80/F with PMH A-fib (s/p DCCV 09/2019, on apixaban), HFpEF (2D Echo 09/2019 EF 60%), HTN, DMII, RA who presented to ED 04/30 with a three day history of worsening SOB. She reports she has dyspnea on exertion at baseline but feels that it has worsened over the several days prior to admission. Denies orthopnea. Reports associated palpitations in addition, and some mild dry cough. She has had some abdominal cramping and loose stool and poor PO intake over the past several days. She denies fever, chills, myalgias or sore throat. She did not take her medications the morning of presentation, but has been taking them consistently beforehand. Upon presentation to ED she was found to have atrial fibrillation with RVR with rate to the 130s-140s. She received metoprolol succinate 200mg PO and metoprolol 5mg IV x 3 without significant response. Lab evaluation was notable for DUNIA, elevated anion gap, and elevated BNP. CXR demonstrated some pulmonary edema. Hospital medicine was contacted for admission.

## 2020-04-30 NOTE — SUBJECTIVE & OBJECTIVE
Past Medical History:   Diagnosis Date    A-fib september    Chronic kidney disease, stage II (mild) 2/3/2014    Diabetes mellitus, type 2 1992    GERD (gastroesophageal reflux disease)     Heart failure, diastolic, chronic 3/3/2014    History of bronchitis     Hypercholesterolemia     Hypertension, malignant 1992    PONV (postoperative nausea and vomiting)     Rheumatoid arthritis 2006    Right bundle branch block 9/12/2018    2018: Diagnosed with RBBB.     Past Surgical History:   Procedure Laterality Date    APPENDECTOMY      CHOLECYSTECTOMY      HYSTERECTOMY  1976    TOE SURGERY  2015    TREATMENT OF CARDIAC ARRHYTHMIA  9/13/2019    Procedure: Cardioversion or Defibrillation;  Surgeon: Garrett Calderon MD;  Location: [facility] CATH LAB;  Service: Cardiology;;     Review of patient's allergies indicates:   Allergen Reactions    Codeine Nausea And Vomiting     States can take oxycodone and hydrocodone    Sulfa (sulfonamide antibiotics) Nausea And Vomiting     No current facility-administered medications on file prior to encounter.      Current Outpatient Medications on File Prior to Encounter   Medication Sig    acetaminophen (TYLENOL) 325 MG tablet Take 2 tablets (650 mg total) by mouth every 4 (four) hours as needed.    apixaban (ELIQUIS) 5 mg Tab Take 1 tablet (5 mg total) by mouth 2 (two) times daily.    atorvastatin (LIPITOR) 20 MG tablet Take 1 tablet (20 mg total) by mouth once daily.    benazepril (LOTENSIN) 20 MG tablet Take 1 tablet (20 mg total) by mouth once daily.    fish oil-omega-3 fatty acids 300-1,000 mg capsule Take 1 capsule by mouth once daily.    folic acid (FOLVITE) 1 MG tablet Take 1 tablet by mouth everyday    furosemide (LASIX) 40 MG tablet Take 1 tablet (40 mg total) by mouth once daily.    gabapentin (NEURONTIN) 300 MG capsule Take 1 capsule by mouth every morning and at lunch, then 2 capsules every evening    insulin (BASAGLAR KWIKPEN U-100 INSULIN) glargine 100  units/mL (3mL) SubQ pen Inject 12 Units into the skin once daily. Take 15 units every 5th day.    methotrexate 2.5 MG Tab Take 10 mg by mouth every Monday.     metoprolol succinate (TOPROL-XL) 200 MG 24 hr tablet Take 1 tablet (200 mg total) by mouth once daily.    oxymetazoline (AFRIN, OXYMETAZOLINE,) 0.05 % nasal spray 2 sprays by Nasal route 2 (two) times daily as needed (bleeding).    TRADJENTA 5 mg Tab tablet Take 5 mg by mouth once daily.     UMECLIDINIUM BRM/VILANTEROL TR (ANORO ELLIPTA INHL) Inhale 1 puff into the lungs once daily.     VENTOLIN HFA 90 mcg/actuation inhaler Inhale 2 puffs into the lungs 2 (two) times daily as needed for Wheezing or Shortness of Breath.     vitamin D 1000 units Tab Take 1,000 Units by mouth once daily.     [DISCONTINUED] benazepril (LOTENSIN) 20 MG tablet TAKE 1 TABLET(20 MG) BY MOUTH EVERY DAY    [DISCONTINUED] furosemide (LASIX) 20 MG tablet TAKE 1 TABLET(20 MG) BY MOUTH EVERY DAY     Family History     Problem Relation (Age of Onset)    Asthma Daughter    Diabetes type II Daughter    Heart attack Father    Hypertension Mother, Daughter    No Known Problems Son, Son    Stroke Mother        Tobacco Use    Smoking status: Never Smoker    Smokeless tobacco: Never Used   Substance and Sexual Activity    Alcohol use: No    Drug use: Never    Sexual activity: Not Currently     Review of Systems   Constitutional: Positive for fatigue. Negative for chills and fever.   HENT: Negative for sore throat and trouble swallowing.    Eyes: Negative for pain and visual disturbance.   Respiratory: Positive for shortness of breath. Negative for cough.    Cardiovascular: Positive for palpitations. Negative for chest pain.   Gastrointestinal: Negative for abdominal pain, nausea and vomiting.   Genitourinary: Negative for difficulty urinating and dysuria.   Musculoskeletal: Negative for arthralgias and myalgias.   Skin: Negative for rash and wound.   Neurological: Negative for  weakness and numbness.     Objective:     Vital Signs (Most Recent):  Temp: 97.4 °F (36.3 °C) (04/30/20 1214)  Pulse: (!) 127 (04/30/20 1621)  Resp: 18 (04/30/20 1312)  BP: (!) 140/102 (04/30/20 1621)  SpO2: 100 % (04/30/20 1621) Vital Signs (24h Range):  Temp:  [97.4 °F (36.3 °C)] 97.4 °F (36.3 °C)  Pulse:  [117-134] 127  Resp:  [18-29] 18  SpO2:  [97 %-100 %] 100 %  BP: (127-156)/() 140/102     Weight: 59.4 kg (131 lb)  Body mass index is 23.21 kg/m².    Physical Exam   Constitutional: She is oriented to person, place, and time. She appears well-developed. She appears distressed.   HENT:   Head: Normocephalic and atraumatic.   Cardiovascular: Intact distal pulses. An irregularly irregular rhythm present. Tachycardia present.   Pulmonary/Chest: Tachypnea noted. She has rales.   Abdominal: Soft. There is no tenderness.   Musculoskeletal: She exhibits edema. She exhibits no tenderness.   Neurological: She is alert and oriented to person, place, and time.   Skin: She is diaphoretic.   Cool to touch.   Nursing note and vitals reviewed.    Significant Labs:   CBC:  Recent Labs   Lab 04/30/20  1242   WBC 8.69   HGB 11.5*   HCT 36.2*      GRAN 76.4*  6.6   LYMPH 12.4*  1.1   MONO 10.0  0.9   EOS 0.0   BASO 0.02      CMP:  Recent Labs   Lab 04/30/20  1242      K 4.9      CO2 19*   BUN 26*   CREATININE 1.7*   *   CALCIUM 9.9   ALKPHOS 106   AST 53*   ALT 42   BILITOT 1.6*   PROT 8.0   ALBUMIN 3.8   ANIONGAP 18*      Significant Imaging:  CXR 04/30/20:  Cardiac silhouette is mildly enlarged. There is bilateral pulmonary vascular congestion and interstitial and alveolar lung opacity suspicious for pulmonary edema. Bibasilar consolidation is suggestive of pleural fluid and atelectasis and or pneumonia.  Visualized bones grossly unremarkable.

## 2020-04-30 NOTE — ASSESSMENT & PLAN NOTE
- DUNIA likely secondary to hypoperfusion in setting of CHF exacerbation / AFib-RVR.  - Fluid challenged in ED, but does not appear to have improved perfusion.  - Close monitoring in setting of diuresis.

## 2020-04-30 NOTE — ED TRIAGE NOTES
Pt reports sob x 3 days with diarrhea and 1 episode of vomiting today. Pt reports she is always in a fib.reports having an appointment with [facility] but was to SOB to go. Denies going out in public but reports that she lives with her daughter and grandson and they both go out. Denies any fever. Denies any edema. Pt reports chest pain with exertion.

## 2020-05-01 PROBLEM — R57.9 SHOCK, UNSPECIFIED: Status: ACTIVE | Noted: 2020-01-01

## 2020-05-01 PROBLEM — E87.29 INCREASED ANION GAP METABOLIC ACIDOSIS: Status: ACTIVE | Noted: 2020-01-01

## 2020-05-01 PROBLEM — E87.5 HYPERKALEMIA: Status: ACTIVE | Noted: 2020-01-01

## 2020-05-01 PROBLEM — R79.89 ELEVATED TROPONIN: Status: ACTIVE | Noted: 2020-01-01

## 2020-05-01 PROBLEM — R74.01 TRANSAMINITIS: Status: ACTIVE | Noted: 2020-01-01

## 2020-05-01 PROBLEM — R57.0 CARDIOGENIC SHOCK: Status: ACTIVE | Noted: 2020-01-01

## 2020-05-01 PROBLEM — E87.20 LACTIC ACIDOSIS: Status: ACTIVE | Noted: 2020-01-01

## 2020-05-01 PROBLEM — R57.0 CARDIOGENIC SHOCK: Status: RESOLVED | Noted: 2020-01-01 | Resolved: 2020-01-01

## 2020-05-01 NOTE — PROCEDURES
"Sandra Oseguera is a 80 y.o. female patient.    Temp: 96.7 °F (35.9 °C) (05/01/20 1105)  Pulse: (!) 50 (05/01/20 1615)  Resp: 16 (05/01/20 1615)  BP: (!) 154/93 (05/01/20 1530)  SpO2: (!) 91 % (05/01/20 1615)  Weight: 64 kg (141 lb 1.5 oz) (05/01/20 1151)  Height: 5' 3" (160 cm) (05/01/20 1155)       Central Line  Date/Time: 5/1/2020 4:28 PM  Location procedure was performed: [facility] PULMONARY MEDICINE  Performed by: Stephanie De Leon DO  Pre-operative Diagnosis: shock, DUNIA, hyperkalemia  Post-operative diagnosis: shock, DUNIA, hyperkalemia  Consent Done: Yes  Time out: Immediately prior to procedure a "time out" was called to verify the correct patient, procedure, equipment, support staff and site/side marked as required.  Indications: med administration, hemodialysis and vascular access  Anesthesia: local infiltration    Anesthesia:  Local Anesthetic: lidocaine 1% without epinephrine  Anesthetic total: 4 mL  Preparation: skin prepped with ChloraPrep  Skin prep agent dried: skin prep agent completely dried prior to procedure  Sterile barriers: all five maximum sterile barriers used - cap, mask, sterile gown, sterile gloves, and large sterile sheet  Hand hygiene: hand hygiene performed prior to central venous catheter insertion  Location details: right internal jugular  Catheter type: trialysis  Catheter Length: 15cm    Ultrasound guidance: yes  Vessel Caliber: large, patent, compressibility normal  Needle advanced into vessel with real time Ultrasound guidance.  Guidewire confirmed in vessel.  Sterile sheath used.  Manometry: No (guidewire confirmed in vein on US)   Number of attempts: 1  Assessment: placement verified by x-ray,  no pneumothorax on x-ray and successful placement  Complications: none  Estimated blood loss (mL): 5  Specimens: No  Implants: Yes  Post-procedure: line sutured and chlorhexidine patch  Complications: No          Stephanie De Leon  5/1/2020  "

## 2020-05-01 NOTE — PLAN OF CARE
Recommendations    1. Recommend Renal diet.   2. Encourage PO intake of meals and commercial beverage.   3. If PO intake <50% of meals recommend Nepro BID.   4. Weekly weights.     Goals: 1. >75% of EEN, EPN by LEONEL marx up.   Nutrition Goal Status: new  Communication of LEONEL Recs: other (comment)(POC)

## 2020-05-01 NOTE — PROGRESS NOTES
Pt received on 5LNC;SPO2 100%. Weaned pt to 3L;SPO2 remains normal. DPI was given and tolerated well. No other changes were made. Will continue to monitor.

## 2020-05-01 NOTE — ASSESSMENT & PLAN NOTE
-con't basal bolus insulin  -monitor for hypoglycemia due to DUNIA and hepatic dysfunction  -goal -180 while admitted

## 2020-05-01 NOTE — HPI
Ms. Oseguera is a 81 y/o woman with a history of severe HFpEF with concentric LVH, paroxysmal Afib, and COPD who was admitted with on 4/30 with Afib with RVR and acute decompensated diastolic heart failure. She has been feeling poorly for about 4 days prior to admission, complaining of worsened SOB, cough, fatigue, and diarrhea with mild nausea. She has chronic fatigue and dyspnea that limit her activity significantly at baseline. She denies sputum production, fevers, chills, or sick contacts. She has been staying at home recently and her only contact has been her daughter, who has not been ill. She has a history of Afib with RVR in 9/2019 that was treated with DCCV. She has been complaint with her lasix and has not recently had leg edema or abdominal distention. She has a history of chronic COPD treated with Anoro PTA. She has continued her MTX regimen for her RA, but was feeling poorly this week and did not take her scheduled dose. She has never required biologics for her RA.

## 2020-05-01 NOTE — PLAN OF CARE
Patient's daughter Sagrario was updated on the changes this afternoon. She had spoken with Dr. Guerra this afternoon while she was being resuscitated. Her daughter Jerilyn's (patient's granddaughter) number was added to the chart as a potential contact. If the daughter is unable to be reached, please leave a message and she will call back.    Stephanie De Leon 05/01/2020 4:40 PM  [employer] [facility] Fellow

## 2020-05-01 NOTE — EICU
Rounding (Video Assessment):  Yes    Intervention Initiated From:  Bedside    Domingo Communicated with Bedside Nurse regarding:  Time-Out    Nurse Notified:  Yes    Doctor Notified:  No    Comments: During timeout Dr. Stephanie De Leon completed time out for arterial line insertion. Privileges verified  1336 Nonresponsive. Code blue, sternal rub no response.

## 2020-05-01 NOTE — PLAN OF CARE
05/01/20 1057   Discharge Assessment   Assessment Type Discharge Planning Assessment   Assessment information obtained from? Caregiver   Prior to hospitilization cognitive status: Alert/Oriented   Prior to hospitalization functional status: Assistive Equipment   Current cognitive status: Alert/Oriented   Current Functional Status: Assistive Equipment   Lives With child(sulema), adult   Able to Return to Prior Arrangements no   Is patient able to care for self after discharge? Unable to determine at this time (comments)   Patient's perception of discharge disposition home or selfcare   Readmission Within the Last 30 Days no previous admission in last 30 days   Patient currently being followed by outpatient case management? No   Patient currently receives any other outside agency services? No   Equipment Currently Used at Home walker, rolling;cane, straight   Do you have any problems affording any of your prescribed medications? No   Is the patient taking medications as prescribed? yes   Does the patient have transportation home? Yes   Transportation Anticipated family or friend will provide   Does the patient receive services at the [facility]? No   Discharge Plan A Home with family   DME Needed Upon Discharge  none   Patient/Family in Agreement with Plan yes

## 2020-05-01 NOTE — ASSESSMENT & PLAN NOTE
- Acute on chronic diastolic heart failure with A-fib RVR.  - CXR with pulmonary edema, BNP elevated.  - Repeat 2D Echo.  - Poor urine output overnight and worsened renal function; discuss diuresis with nephrology prior to proceeding.  - Strict intake/output, daily weights.

## 2020-05-01 NOTE — ASSESSMENT & PLAN NOTE
- A-Fib RVR with rate to 120s-140s. Skin cool to touch at time of initial evaluation. Started on amiodarone gtt 04/30.  - Continuing apixaban 5mg PO BID.  - Discussed with cardiology; planning for DCCV today.  - Appreciate cardiology assistance.

## 2020-05-01 NOTE — ASSESSMENT & PLAN NOTE
- Normotensive, but with poor perfusion yesterday and hypothermic overnight.  - Suspect secondary to arrhythmia. 2D Echo pending. Repeat troponin, lactate. Check procalcitonin to better differentiate, though low suspicion for infectious process.  - Evidence of shock with worsened metabolic acidosis, shock liver.  - As above.

## 2020-05-01 NOTE — CONSULTS
REASON FOR CONSULTATION: DUNIA, hyperkalemia     HPI:80 y.o. female with HTN, DM, Afib, HFpEF, RA, presents with SOB x 3 days, nausea, abdominal cramping.  She was found to have afib with 's in ED.  She also was founds to have creat 1.7 which worsened overnight to 2.3 along with anion gap metabolic acidosis and hyperkalemia with K 6.4.  She has also developed a severe transaminitis, elevated troponin, and severe lactic acidosis.  She has been afebrile but hypotensive with signficant leukocytosis which has developed over night as well.    REVIEW OF SYSTEMS:    Constitution: Positive for malaise/fatigue. Negative for chills and fever.   HENT: Negative for nosebleeds.    Eyes: Negative for double vision, vision loss in left eye and vision loss in right eye.   Cardiovascular: Positive for dyspnea on exertion, leg swelling, orthopnea and palpitations. Negative for chest pain, claudication, irregular heartbeat, near-syncope, paroxysmal nocturnal dyspnea and syncope.   Respiratory: Positive for shortness of breath. Negative for cough, hemoptysis and wheezing.    Endocrine: Negative for cold intolerance and heat intolerance.   Hematologic/Lymphatic: Negative for bleeding problem. Does not bruise/bleed easily.   Skin: Negative for color change and rash.   Musculoskeletal: Negative for back pain, falls, muscle weakness and myalgias.   Gastrointestinal: Positive for nausea and vomiting. Negative for heartburn, hematemesis, hematochezia, hemorrhoids, jaundice and melena.   Genitourinary: Negative for dysuria and hematuria.   Neurological: Positive for weakness. Negative for dizziness, focal weakness, headaches, light-headedness, loss of balance, numbness and vertigo.   Psychiatric/Behavioral: Negative for altered mental status, depression and memory loss. The patient is not nervous/anxious.    Allergic/Immunologic: Negative for hives and persistent infections.        Past Medical History:   Diagnosis Date    A-fib  september    Chronic kidney disease, stage II (mild) 2/3/2014    Diabetes mellitus, type 2 1992    GERD (gastroesophageal reflux disease)     Heart failure, diastolic, chronic 3/3/2014    History of bronchitis     Hypercholesterolemia     Hypertension, malignant 1992    PONV (postoperative nausea and vomiting)     Rheumatoid arthritis 2006    Right bundle branch block 9/12/2018    2018: Diagnosed with RBBB.       Past Surgical History:   Procedure Laterality Date    APPENDECTOMY      CHOLECYSTECTOMY      HYSTERECTOMY  1976    TOE SURGERY  2015    TREATMENT OF CARDIAC ARRHYTHMIA  9/13/2019    Procedure: Cardioversion or Defibrillation;  Surgeon: Garrett Calderon MD;  Location: [facility] CATH LAB;  Service: Cardiology;;       Review of patient's allergies indicates:   Allergen Reactions    Codeine Nausea And Vomiting     States can take oxycodone and hydrocodone    Sulfa (sulfonamide antibiotics) Nausea And Vomiting       Medications Prior to Admission   Medication Sig Dispense Refill Last Dose    acetaminophen (TYLENOL) 325 MG tablet Take 2 tablets (650 mg total) by mouth every 4 (four) hours as needed.  0 Past Week    apixaban (ELIQUIS) 5 mg Tab Take 1 tablet (5 mg total) by mouth 2 (two) times daily. 180 tablet 3 4/29/2020    atorvastatin (LIPITOR) 20 MG tablet Take 1 tablet (20 mg total) by mouth once daily. 90 tablet 3 4/29/2020    benazepril (LOTENSIN) 20 MG tablet Take 1 tablet (20 mg total) by mouth once daily. 90 tablet 3 4/29/2020    fish oil-omega-3 fatty acids 300-1,000 mg capsule Take 1 capsule by mouth once daily.   4/29/2020    folic acid (FOLVITE) 1 MG tablet Take 1 tablet by mouth everyday  3 4/29/2020    furosemide (LASIX) 40 MG tablet Take 1 tablet (40 mg total) by mouth once daily. 120 tablet 3 4/29/2020    gabapentin (NEURONTIN) 300 MG capsule Take 1 capsule by mouth every morning and at lunch, then 2 capsules every evening  2 4/29/2020    insulin (BASAGLAR KWIKPEN U-100  INSULIN) glargine 100 units/mL (3mL) SubQ pen Inject 12 Units into the skin once daily. Take 15 units every 5th day.  0 4/29/2020    methotrexate 2.5 MG Tab Take 10 mg by mouth every Monday.    4/29/2020    metoprolol succinate (TOPROL-XL) 200 MG 24 hr tablet Take 1 tablet (200 mg total) by mouth once daily. 90 tablet 3 4/29/2020    oxymetazoline (AFRIN, OXYMETAZOLINE,) 0.05 % nasal spray 2 sprays by Nasal route 2 (two) times daily as needed (bleeding).  0 4/29/2020    TRADJENTA 5 mg Tab tablet Take 5 mg by mouth once daily.    4/29/2020    UMECLIDINIUM BRM/VILANTEROL TR (ANORO ELLIPTA INHL) Inhale 1 puff into the lungs once daily.    4/29/2020    VENTOLIN HFA 90 mcg/actuation inhaler Inhale 2 puffs into the lungs 2 (two) times daily as needed for Wheezing or Shortness of Breath.    4/29/2020    vitamin D 1000 units Tab Take 1,000 Units by mouth once daily.    4/29/2020       Current Facility-Administered Medications   Medication Dose Route Frequency Provider Last Rate Last Dose    albuterol-ipratropium 2.5 mg-0.5 mg/3 mL nebulizer solution 3 mL  3 mL Nebulization Q4H PRN MOSES Guerra MD   3 mL at 05/01/20 0343    amiodarone 360 mg/200 mL (1.8 mg/mL) infusion  0.5 mg/min Intravenous Continuous Garrett Calderon MD   Stopped at 05/01/20 0945    amiodarone tablet 200 mg  200 mg Oral TID AC Garrett Calderon MD   200 mg at 05/01/20 0630    apixaban tablet 5 mg  5 mg Oral BID DJolene Guerra MD   5 mg at 05/01/20 0820    atropine 0.1 mg/mL injection             azithromycin 500 mg in dextrose 5 % 250 mL IVPB (ready to mix system)  500 mg Intravenous Q24H Stephanie De Leon DO        cefepime in dextrose 5 % IVPB 2 g  2 g Intravenous Q24H Stephanie De Leon MD        dextrose 50% injection 12.5 g  12.5 g Intravenous PRN MOSES Guerra MD   12.5 g at 05/01/20 0001    dextrose 50% injection 25 g  25 g Intravenous PRN MOSES Guerra MD   25 g at 05/01/20 0513    dextrose 50% injection 25 g  25 g  Intravenous PRN Diamond Dickinson MD        fluticasone furoate-vilanteroL 200-25 mcg/dose diskus inhaler 1 puff  1 puff Inhalation Daily MOSES Guerra MD   1 puff at 05/01/20 0824    glucagon (human recombinant) injection 1 mg  1 mg Intramuscular PRN MOSES Guerra MD        glucose chewable tablet 16 g  16 g Oral PRN MOSES Guerra MD        glucose chewable tablet 24 g  24 g Oral PRN MOSES Guerra MD        insulin aspart U-100 pen 0-5 Units  0-5 Units Subcutaneous QID (AC + HS) PRN MOSES Guerra MD        insulin detemir U-100 pen 7 Units  7 Units Subcutaneous QHS MOSES Guerra MD   7 Units at 04/30/20 2034    metoprolol tartrate (LOPRESSOR) tablet 25 mg  25 mg Oral BID Garrett Calderon MD        sodium bicarbonate 1 mEq/mL (8.4 %) 150 mEq in dextrose 5 % 1,000 mL infusion   Intravenous Continuous Diamond Dickinson MD 75 mL/hr at 05/01/20 0636      sodium chloride 0.9% flush 10 mL  10 mL Intravenous PRN MOSES Guerra MD        vancomycin - pharmacy to dose   Intravenous pharmacy to manage frequency Stephanie De Leon DO        vancomycin 1.5 g in 5 % dextrose 250 mL IVPB  1,500 mg Intravenous Once Stephanie De Leon MD           Social History     Socioeconomic History    Marital status:      Spouse name: Not on file    Number of children: 3    Years of education: Not on file    Highest education level: Not on file   Occupational History    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 [employer]     Comment: Retired    Occupation:     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   Social Needs    Financial resource strain: Not on file    Food insecurity:     Worry: Not on file     Inability: Not on file    Transportation needs:     Medical: Not on file     Non-medical: Not on file   Tobacco Use    Smoking status: Never Smoker    Smokeless tobacco: Never Used   Substance and Sexual Activity    Alcohol use: No    Drug use: Never    Sexual activity: Not  Currently   Lifestyle    Physical activity:     Days per week: Not on file     Minutes per session: Not on file    Stress: Not on file   Relationships    Social connections:     Talks on phone: Not on file     Gets together: Not on file     Attends Jew service: Not on file     Active member of club or organization: Not on file     Attends meetings of clubs or organizations: Not on file     Relationship status: Not on file   Other Topics Concern    Not on file   Social History Narrative    Not on file       Family History   Problem Relation Age of Onset    Hypertension Mother     Stroke Mother     Heart attack Father     Diabetes type II Daughter     Hypertension Daughter     Asthma Daughter     No Known Problems Son     No Known Problems Son        Temp:  [96.4 °F (35.8 °C)-97.8 °F (36.6 °C)] 97.8 °F (36.6 °C)  Pulse:  [] 46  Resp:  [18-56] 26  SpO2:  [88 %-100 %] 97 %  BP: ()/() 91/55      PHYSICAL EXAM:    GEN:  Alert and Oriented x4, NAD, appropriate affect  HEENT:  NCAT, KINA, No conjunctivitis, normal sclera, O/P clear, no oropharyngial lesions, no thrush, neck supple, No LAD, Nml JVD, no carotid bruits  CV:  RRR no MRG  Lungs:  CTAb, no rhonchi, wheeze, crackles, normal excursion  Abdomen: Obese, soft, NTND, no fluid wave, no HSM, NABS  Ext:  No CCE, normal DP pulses bilat  Neuro:  Non-Focal, EOMI, gait not assessed  Skin:  No rash, excoriation, petchiae      Labs and Radiology reviewed    ASSESSMENT AND PLAN:  79 YO female admitted with suspected Pna with features of pulm edema on CXR  -Covid 19 neg  -Chronic    DUNIA on CKD 3A/A1 with metabolic acidosis, hyperkalemia  -Follows with Dr. Magana in renal clinic  -Was fluid challenged in ED did not respond  -Recent baseline Cr~ 1.2-1.4  -Creat now 2.3, Co2 8, K 6.4  -Renal US, lytes, eos, workup  -Hold ACE and nephrotoxins  -Agree with Dr. Guerra that given she didn't respond to fluid challenge, should give her trial of  "lasix and check an echo given possible cardiorenal syndrome  -Will give oral bicarb to address acidosis and shift K intracellular  -Will also give a dose of Valtessa and monitor for need for additional doses.    Leukocytosis  -On emperic Cefepime, Vanc, and Azithromycin  -Await culture results    Vitamin D deficiency  - Last Vitamin D 48.  - Continue D3 1000units/d.    T2DM with CKD Stage 3  - Now on Basaglar in addition to Tradjenta.    Uric acid Nephrolithiasis  - Nonobstructing calculus on U/S in 2018.  - Repeat U/S now  - Check uric acid level    Transaminitis  -Acute and severe.  -Statin held, no acetaminophen  -Should also hold methotrexate  -She said she has had elevations in her "liver tests" before and Dr. Figueroa decreased her dose.    Atrial fibrillation on chronic anticoagulation  - Followed by Dr. Calderon.  - Eliquis and Metoprolol XL 200mg/d.    Acute on Chronic shortness of breath  - 1/13/2017: PFTs: Small airway obstruction without significant response to inhalers.  - On Anoro inhaler and Ventoline and was in pulmonary rehabilitation program.  - Concerned possible methetrexate pulm toxicity compromising her lung function further?  - Followed by Dr. Wade.    Rheumatoid Arthritis of both hands  - MTX and folate per Dr. Figueroa.        "

## 2020-05-01 NOTE — PROGRESS NOTES
Pharmacokinetic Initial Assessment: IV Vancomycin    Assessment/Plan:    Initiate intravenous vancomycin with loading dose of 1500 mg once with subsequent doses when random concentrations are less than 20 mcg/mL  Desired empiric serum trough concentration is 15 to 20 mcg/mL  Draw vancomycin random level on 5/2 at 430.  Pharmacy will continue to follow and monitor vancomycin.      Please contact pharmacy at extension [phone] with any questions regarding this assessment.     Thank you for the consult,   Mitzi Green       Patient brief summary:  Sandra Oseguera is a 80 y.o. female initiated on antimicrobial therapy with IV Vancomycin for treatment of suspected pneumonia     Drug Allergies:   Review of patient's allergies indicates:   Allergen Reactions    Codeine Nausea And Vomiting     States can take oxycodone and hydrocodone    Sulfa (sulfonamide antibiotics) Nausea And Vomiting       Actual Body Weight:   64kg    Renal Function:   Estimated Creatinine Clearance: 17.8 mL/min (A) (based on SCr of 2.3 mg/dL (H)).,     Dialysis Method (if applicable):  N/A    CBC (last 72 hours):  Recent Labs   Lab Result Units 04/30/20  1242 04/30/20  1801 05/01/20  0318   WBC K/uL 8.69  --  16.95*   Hemoglobin g/dL 11.5*  --  12.7   Hemoglobin A1C %  --  8.3*  --    Hematocrit % 36.2*  --  41.4   Platelets K/uL 154  --  131*   Gran% % 76.4*  --  83.1*   Lymph% % 12.4*  --  5.1*   Mono% % 10.0  --  10.9   Eosinophil% % 0.2  --  0.0   Basophil% % 0.2  --  0.1   Differential Method  Automated  --  Automated       Metabolic Panel (last 72 hours):  Recent Labs   Lab Result Units 04/30/20  1242 05/01/20  0318 05/01/20  0730   Sodium mmol/L 141 141  --    Sodium Urine Random mmol/L  --   --  38   Potassium mmol/L 4.9 6.4*  --    Chloride mmol/L 104 105  --    CO2 mmol/L 19* 8*  --    Glucose mg/dL 207* 82  --    Glucose, UA   --   --  Negative   BUN, Bld mg/dL 26* 33*  --    Creatinine mg/dL 1.7* 2.3*  --    Creatinine, Random Ur  mg/dL  --   --  90.3   Albumin g/dL 3.8 3.7  --    Total Bilirubin mg/dL 1.6* 2.6*  --    Alkaline Phosphatase U/L 106 128  --    AST U/L 53* 1,052*  --    ALT U/L 42 689*  --    Magnesium mg/dL 1.8  --   --        Drug levels (last 3 results):  No results for input(s): VANCOMYCINRA, VANCOMYCINPE, VANCOMYCINTR in the last 72 hours.    Microbiologic Results:  Microbiology Results (last 7 days)       Procedure Component Value Units Date/Time    Blood culture [[id number]]     Order Status:  Sent Specimen:  Blood     Blood culture [[id number]]     Order Status:  Sent Specimen:  Blood

## 2020-05-01 NOTE — ASSESSMENT & PLAN NOTE
Shock- unclear if cardiogenic from severe HFpEF or if septic shock is a component. Lactic acidosis worsened overnight. With her chronic immunosuppression, she may not develop typical infectious symptoms, and no source has yet been identified.  -blood cultures today  -Empiric antibiotics to cover respiratory pathogens. Broad coverage required with her immunosuppression.  -serial LA, BMPs  -no indication currently to perform thoracentesis, but if she were to decompensate and there was a concern for source control, it would be considered  -optimize heart failure through rate control and diuresis  -echo pending

## 2020-05-01 NOTE — ASSESSMENT & PLAN NOTE
- DUNIA likely secondary to hypoperfusion in setting of CHF exacerbation / AFib-RVR.  - DUNIA worsened with worsened anion gap metabolic acidosis. Repeat lactate. Check urine studies.  - Nephrology consult.

## 2020-05-01 NOTE — ASSESSMENT & PLAN NOTE
HFpEF with bilateral pleural effusions, likely due to diastolic heart failure.  -agree with appropriate diuresis  -electrolyte repletion PRN

## 2020-05-01 NOTE — PLAN OF CARE
Initial Discharge Planning Assessment:    CM spoke with daughter Sagrario Oseguera to compete assessment [phone]    Patient admitted on  4/30/20  PCP updated in Epic: Dr. Potts   Pharmacy, updated in Epic: [facility] on [address] [facility] bedside Delivery : yes  DME at home: cane and rolling walker  Current dispo: Pt  lives at home with her saiugher  Transportation: Family to drive home  Power of  or Living Will:  Her daughter states she has a living will, but isn't sure where it's located in the house  Hospital Readmission none     Case management  to follow.           05/01/20 1057   Discharge Assessment   Assessment Type Discharge Planning Assessment   Assessment information obtained from? Caregiver   Prior to hospitilization cognitive status: Alert/Oriented   Prior to hospitalization functional status: Assistive Equipment   Current cognitive status: Alert/Oriented   Current Functional Status: Assistive Equipment   Lives With child(sulema), adult   Able to Return to Prior Arrangements no   Is patient able to care for self after discharge? Unable to determine at this time (comments)   Patient's perception of discharge disposition home or selfcare   Readmission Within the Last 30 Days no previous admission in last 30 days   Patient currently being followed by outpatient case management? No   Patient currently receives any other outside agency services? No   Equipment Currently Used at Home walker, rolling;cane, straight   Do you have any problems affording any of your prescribed medications? No   Is the patient taking medications as prescribed? yes   Does the patient have transportation home? Yes   Transportation Anticipated family or friend will provide   Does the patient receive services at the Coumadin Clinic? No   Discharge Plan A Home with family   DME Needed Upon Discharge  none   Patient/Family in Agreement with Plan yes

## 2020-05-01 NOTE — PLAN OF CARE
Patient has a dx of A-Fib with RVR. AAOx4, on 3 L o2 n/c, did require up to 4 L o2 n/c when getting out of bed to the BSC, afebrile. Amiodarone drip continues at 0.5 mg/min, in addition to scheduled oral amiodarone. NPO except for medications for today's cardioversion. Q6H accuchecks performed - 12.5 g D50 IVP given at midnight for BG 67. Administered 25 g D50 IVP with 10 units regular insulin to shift potassium levels this morning. Vides inserted with low urine output noted.

## 2020-05-01 NOTE — PROGRESS NOTES
Patient seen and examined by me. I agree with the trainee's separate note except as modified.     80 year old with h/o Afib, HFpEF, HTN, DM, RA came in yesterday with 3 days of worsened SOB. Found to be in Afib with RVR. Plan was for DCCV this AM but converted to bradycardia so this was not performed. Hypothermic last night.   Patient also notes a cough and diarrhea over the past couple of days. No sputum production. No known sick contacts.     WBC 9 to 17  K 4.9 to 6.4  Bicarb 19 to 8  Creatinine 1.7 to 2.3  CNTI 0.05 to 0.15  Lactate>12  Admission   COVID negative   LFTs markedly increased compared to yesterday    CXR reviewed by me: bilateral increased interstitial markings    MADAY 42 on TTE today, full report pending     On my exam: chest U/S done by us shows bilateral L>R simple-appearing pleural effusions. Possible consolidation at the base but could also represent atelectasis     Impression: came in with Afib and RVR, picture c/w CHF exacerbation, but lactic acidosis to this degree and LFT rise are more consistent with something like sepsis.     Recommendations:   -rechecking BMP after K+ was shifted  -renal consulted  -add abx to cover PNA including atypical coverage  -off amio, currently bradycardic; given PO amio to hopefully prevent recurrence  -f/u official TTE    Critical care time (30min) spent personally by me on the following activities: development of treatment plan with patient or surrogate and bedside caregivers, discussions with consultants, evaluation of patient's response to treatment, examination of patient, ordering and performing treatments and interventions, ordering and review of laboratory studies, ordering and review of radiographic studies, pulse oximetry, re-evaluation of patient's condition. This critical care time did not overlap with that of any other provider or involve time for any procedures.

## 2020-05-01 NOTE — PLAN OF CARE
Patient received on 3L nc w/humidity. Sats 98%. Prn tx given pt was SOB. Pt. in no distress, will continue to monitor.

## 2020-05-01 NOTE — PROGRESS NOTES
Rt was called to pts room due to low saturation. Attempted multiple locations for a pulse ox but was unsuccessful. Pt was placed on BIPAP per Dr. Hernandez. Pt began looking glazed and barley responding. Pts HR began declining to 20's, BP decreased;HR was not felt;staff began CPR with 100% BVM .One round of (2 minutes)CPR was obtained. Pt awakened and began resisting resuscitative efforts.  Pt was emergently intubated @ approximately 1345 with a 7.5 ETT @ 23 Lips by Pulm CC Dr. Hernandez and Dr. Francois at bedside. Pt was placed on Drager #5 with documented settings.   A line was placed by Dr. Hernandez. ABG was obtained;results were shown to Dr. Hernandez. Changes were made to ventilator settings. Repeat ABG in 2 hours. Will continue to monitor.

## 2020-05-01 NOTE — CONSULTS
[facility]  Pulmonology  Consult Note    Patient Name: Sandra Oseguera  MRN: [id number]  Admission Date: 4/30/2020  Hospital Length of Stay: 1 days  Code Status: Full Code  Attending Physician: MOSES Guerra MD  Primary Care Provider: America Potts MD   Principal Problem: Atrial fibrillation with rapid ventricular response    Inpatient consult to Pulmonary Critical Care  Consult performed by: Stephanie De Leon DO  Consult ordered by: MOSES Guerra MD        Subjective:     HPI:  Ms. Oseguera is a 79 y/o woman with a history of severe HFpEF with concentric LVH, paroxysmal Afib, and COPD who was admitted with on 4/30 with Afib with RVR and acute decompensated diastolic heart failure. She has been feeling poorly for about 4 days prior to admission, complaining of worsened SOB, cough, fatigue, and diarrhea with mild nausea. She has chronic fatigue and dyspnea that limit her activity significantly at baseline. She denies sputum production, fevers, chills, or sick contacts. She has been staying at home recently and her only contact has been her daughter, who has not been ill. She has a history of Afib with RVR in 9/2019 that was treated with DCCV. She has been complaint with her lasix and has not recently had leg edema or abdominal distention. She has a history of chronic COPD treated with Anoro PTA. She has continued her MTX regimen for her RA, but was feeling poorly this week and did not take her scheduled dose. She has never required biologics for her RA.    Past Medical History:   Diagnosis Date    A-fib september    Chronic kidney disease, stage II (mild) 2/3/2014    Diabetes mellitus, type 2 1992    GERD (gastroesophageal reflux disease)     Heart failure, diastolic, chronic 3/3/2014    History of bronchitis     Hypercholesterolemia     Hypertension, malignant 1992    PONV (postoperative nausea and vomiting)     Rheumatoid arthritis 2006    Right bundle branch block  9/12/2018    2018: Diagnosed with RBBB.       Past Surgical History:   Procedure Laterality Date    APPENDECTOMY      CHOLECYSTECTOMY      HYSTERECTOMY  1976    TOE SURGERY  2015    TREATMENT OF CARDIAC ARRHYTHMIA  9/13/2019    Procedure: Cardioversion or Defibrillation;  Surgeon: Garrett Calderon MD;  Location: [facility] CATH LAB;  Service: Cardiology;;       Review of patient's allergies indicates:   Allergen Reactions    Codeine Nausea And Vomiting     States can take oxycodone and hydrocodone    Sulfa (sulfonamide antibiotics) Nausea And Vomiting       Family History     Problem Relation (Age of Onset)    Asthma Daughter    Diabetes type II Daughter    Heart attack Father    Hypertension Mother, Daughter    No Known Problems Son, Son    Stroke Mother        Tobacco Use    Smoking status: Never Smoker    Smokeless tobacco: Never Used   Substance and Sexual Activity    Alcohol use: No    Drug use: Never    Sexual activity: Not Currently         Review of Systems   Constitutional: Positive for appetite change, chills and fatigue. Negative for fever.   HENT: Negative for rhinorrhea.    Respiratory: Positive for cough and shortness of breath.    Cardiovascular: Negative for leg swelling.   Gastrointestinal: Positive for abdominal pain, diarrhea and nausea. Negative for vomiting.   Musculoskeletal: Positive for gait problem.   Skin: Negative for rash.   Psychiatric/Behavioral: Negative for confusion.     Objective:     Vital Signs (Most Recent):  Temp: 96.7 °F (35.9 °C) (05/01/20 1105)  Pulse: (!) 46 (05/01/20 1130)  Resp: (!) 27 (05/01/20 1130)  BP: (!) 94/50 (05/01/20 1130)  SpO2: 98 % (05/01/20 1130) Vital Signs (24h Range):  Temp:  [96.4 °F (35.8 °C)-97.8 °F (36.6 °C)] 96.7 °F (35.9 °C)  Pulse:  [] 46  Resp:  [18-56] 27  SpO2:  [88 %-100 %] 98 %  BP: ()/() 94/50     Weight: 64 kg (141 lb 1.5 oz)  Body mass index is 24.99 kg/m².      Intake/Output Summary (Last 24 hours) at 5/1/2020  1235  Last data filed at 5/1/2020 1217  Gross per 24 hour   Intake 1872.12 ml   Output 173 ml   Net 1699.12 ml       Physical Exam   Constitutional: She appears well-developed and well-nourished.   HENT:   Head: Normocephalic and atraumatic.   Eyes: No scleral icterus.   Neck: Neck supple.   Cardiovascular:   No murmur heard.  Bradycardic, regular rhythm   Pulmonary/Chest: Effort normal.   Decreased breath sounds bilateral bases, no wheezing. Breathing comfortably on 4L NC.   Abdominal: Soft. She exhibits no distension.   Musculoskeletal: She exhibits no edema or deformity.   Neurological: She is alert.   Globally weak, required assistance to sit forward in bed   Skin: Skin is warm and dry. No rash noted.         Lines/Drains/Airways     Drain                 Urethral Catheter 05/01/20 0040 less than 1 day          Peripheral Intravenous Line                 Peripheral IV - Single Lumen 04/30/20 1236 20 G Right Antecubital less than 1 day         Peripheral IV - Single Lumen 04/30/20 1727 20 G Left Forearm less than 1 day                Significant Labs:    CBC/Anemia Profile:  Recent Labs   Lab 04/30/20  1242 05/01/20  0318   WBC 8.69 16.95*   HGB 11.5* 12.7   HCT 36.2* 41.4    131*   * 104*   RDW 16.7* 17.2*        Chemistries:  Recent Labs   Lab 04/30/20  1242 05/01/20  0318    141   K 4.9 6.4*    105   CO2 19* 8*   BUN 26* 33*   CREATININE 1.7* 2.3*   CALCIUM 9.9 10.0   ALBUMIN 3.8 3.7   PROT 8.0 7.8   BILITOT 1.6* 2.6*   ALKPHOS 106 128   ALT 42 689*   AST 53* 1,052*   MG 1.8  --      Lactic Acid:   Recent Labs   Lab 05/01/20  0739   LACTATE >12.0*     Troponin:   Recent Labs   Lab 04/30/20  1242 05/01/20  0739   TROPONINI 0.051* 0.150*     All pertinent labs within the past 24 hours have been reviewed.    Significant Imaging:   CXR: I have reviewed all pertinent results/findings within the past 24 hours and my personal findings are:  bibasilar opacities  Echo: I have reviewed all  pertinent results/findings within the past 24 hours and my personal findings are:  previous echo with severe concentric LVH, diastolic dysfunction, LAE, MR     Bedside US with dependent bilateral pleural effusions. Atelectasis vs consolidation in underlying lung on the left. No significant B-lines in upper lung zones.    Assessment/Plan:     * Atrial fibrillation with rapid ventricular response  -agree with amiodarone load  -con't to monitor on telemetry    Shock, unspecified  Shock- unclear if cardiogenic from severe HFpEF or if septic shock is a component. Lactic acidosis worsened overnight. With her chronic immunosuppression, she may not develop typical infectious symptoms, and no source has yet been identified.  -blood cultures today  -Empiric antibiotics to cover respiratory pathogens. Broad coverage required with her immunosuppression.  -serial LA, BMPs  -no indication currently to perform thoracentesis, but if she were to decompensate and there was a concern for source control, it would be considered  -optimize heart failure through rate control and diuresis  -echo pending    Lactic acidosis  Worsened lactic acidosis. On metformin PTA, but this has worsened since admission, making this a less likely culprit.  -serial lactate levels    Hyperkalemia  Hyperkalemia likely 2/2 AGMA and DUNIA on CKD  -serial BMPs  -con't bicarb infusion for now  -may need additional therapy to manage her chronic hyperkalemia    Transaminitis  Shock liver seems more likely than congestive hepatopathy  -supportive care  -con't to trend    Elevated troponin  Elevated troponin likely due to acute strain of RVR previously. Unrecognized ACS seems less likely without ischemic changes on EKG. Troponin leak associated with an infection causing critical illness is also possible.  -con't to monitor    DUNIA (acute kidney injury)  DUNIA on CKD, likely due to acute illness causing shock. Complicated by hyperkalemia  -con't to monitor  -renally dose  meds & avoid nephrotoxic meds    Chronic anticoagulation  -con't apixaban; if she deteriorates and is likely to require procedures, changing to heparin may be required    Rheumatoid arthritis  -holding PTA MTX while acutely ill    Acute on chronic diastolic heart failure  HFpEF with bilateral pleural effusions, likely due to diastolic heart failure.  -agree with appropriate diuresis  -electrolyte repletion PRN      Type 2 diabetes mellitus, with long-term current use of insulin  -con't basal bolus insulin  -monitor for hypoglycemia due to DUNIA and hepatic dysfunction  -goal -180 while admitted        Thank you for your consult. I will follow-up with patient. Please contact us if you have any additional questions.     Stephanie De Leon, DO  Pulmonology  [facility]

## 2020-05-01 NOTE — SUBJECTIVE & OBJECTIVE
Past Medical History:   Diagnosis Date    A-fib september    Chronic kidney disease, stage II (mild) 2/3/2014    Diabetes mellitus, type 2 1992    GERD (gastroesophageal reflux disease)     Heart failure, diastolic, chronic 3/3/2014    History of bronchitis     Hypercholesterolemia     Hypertension, malignant 1992    PONV (postoperative nausea and vomiting)     Rheumatoid arthritis 2006    Right bundle branch block 9/12/2018    2018: Diagnosed with RBBB.       Past Surgical History:   Procedure Laterality Date    APPENDECTOMY      CHOLECYSTECTOMY      HYSTERECTOMY  1976    TOE SURGERY  2015    TREATMENT OF CARDIAC ARRHYTHMIA  9/13/2019    Procedure: Cardioversion or Defibrillation;  Surgeon: Garrett Calderon MD;  Location: [facility] CATH LAB;  Service: Cardiology;;       Review of patient's allergies indicates:   Allergen Reactions    Codeine Nausea And Vomiting     States can take oxycodone and hydrocodone    Sulfa (sulfonamide antibiotics) Nausea And Vomiting       Family History     Problem Relation (Age of Onset)    Asthma Daughter    Diabetes type II Daughter    Heart attack Father    Hypertension Mother, Daughter    No Known Problems Son, Son    Stroke Mother        Tobacco Use    Smoking status: Never Smoker    Smokeless tobacco: Never Used   Substance and Sexual Activity    Alcohol use: No    Drug use: Never    Sexual activity: Not Currently         Review of Systems   Constitutional: Positive for appetite change, chills and fatigue. Negative for fever.   HENT: Negative for rhinorrhea.    Respiratory: Positive for cough and shortness of breath.    Cardiovascular: Negative for leg swelling.   Gastrointestinal: Positive for abdominal pain, diarrhea and nausea. Negative for vomiting.   Musculoskeletal: Positive for gait problem.   Skin: Negative for rash.   Psychiatric/Behavioral: Negative for confusion.     Objective:     Vital Signs (Most Recent):  Temp: 96.7 °F (35.9 °C) (05/01/20  1105)  Pulse: (!) 46 (05/01/20 1130)  Resp: (!) 27 (05/01/20 1130)  BP: (!) 94/50 (05/01/20 1130)  SpO2: 98 % (05/01/20 1130) Vital Signs (24h Range):  Temp:  [96.4 °F (35.8 °C)-97.8 °F (36.6 °C)] 96.7 °F (35.9 °C)  Pulse:  [] 46  Resp:  [18-56] 27  SpO2:  [88 %-100 %] 98 %  BP: ()/() 94/50     Weight: 64 kg (141 lb 1.5 oz)  Body mass index is 24.99 kg/m².      Intake/Output Summary (Last 24 hours) at 5/1/2020 1235  Last data filed at 5/1/2020 1217  Gross per 24 hour   Intake 1872.12 ml   Output 173 ml   Net 1699.12 ml       Physical Exam   Constitutional: She appears well-developed and well-nourished.   HENT:   Head: Normocephalic and atraumatic.   Eyes: No scleral icterus.   Neck: Neck supple.   Cardiovascular:   No murmur heard.  Bradycardic, regular rhythm   Pulmonary/Chest: Effort normal.   Decreased breath sounds bilateral bases, no wheezing. Breathing comfortably on 4L NC.   Abdominal: Soft. She exhibits no distension.   Musculoskeletal: She exhibits no edema or deformity.   Neurological: She is alert.   Globally weak, required assistance to sit forward in bed   Skin: Skin is warm and dry. No rash noted.         Lines/Drains/Airways     Drain                 Urethral Catheter 05/01/20 0040 less than 1 day          Peripheral Intravenous Line                 Peripheral IV - Single Lumen 04/30/20 1236 20 G Right Antecubital less than 1 day         Peripheral IV - Single Lumen 04/30/20 1727 20 G Left Forearm less than 1 day                Significant Labs:    CBC/Anemia Profile:  Recent Labs   Lab 04/30/20  1242 05/01/20  0318   WBC 8.69 16.95*   HGB 11.5* 12.7   HCT 36.2* 41.4    131*   * 104*   RDW 16.7* 17.2*        Chemistries:  Recent Labs   Lab 04/30/20  1242 05/01/20  0318    141   K 4.9 6.4*    105   CO2 19* 8*   BUN 26* 33*   CREATININE 1.7* 2.3*   CALCIUM 9.9 10.0   ALBUMIN 3.8 3.7   PROT 8.0 7.8   BILITOT 1.6* 2.6*   ALKPHOS 106 128   ALT 42 689*   AST 53*  1,052*   MG 1.8  --      Lactic Acid:   Recent Labs   Lab 05/01/20  0739   LACTATE >12.0*     Troponin:   Recent Labs   Lab 04/30/20  1242 05/01/20  0739   TROPONINI 0.051* 0.150*     All pertinent labs within the past 24 hours have been reviewed.    Significant Imaging:   CXR: I have reviewed all pertinent results/findings within the past 24 hours and my personal findings are:  bibasilar opacities  Echo: I have reviewed all pertinent results/findings within the past 24 hours and my personal findings are:  previous echo with severe concentric LVH, diastolic dysfunction, LAE, MR     Bedside US with dependent bilateral pleural effusions. Atelectasis vs consolidation in underlying lung on the left. No significant B-lines in upper lung zones.

## 2020-05-01 NOTE — PROCEDURES
"Sandra Oseguera is a 80 y.o. female patient.    Temp: 96.7 °F (35.9 °C) (05/01/20 1105)  Pulse: (!) 52 (05/01/20 1600)  Resp: 16 (05/01/20 1600)  BP: (!) 154/93 (05/01/20 1530)  SpO2: (!) 89 % (05/01/20 1600)  Weight: 64 kg (141 lb 1.5 oz) (05/01/20 1151)  Height: 5' 3" (160 cm) (05/01/20 1155)       Intubation  Date/Time: 5/1/2020 4:16 PM  Location procedure was performed: [facility] PULMONARY MEDICINE  Performed by: Stephanie De Leon DO  Authorized by: Stephanie De Leon DO   Pre-operative diagnosis: resipratory failure  Post-operative diagnosis: respiratory failure  Consent Done: Emergent Situation  Indications: respiratory distress and  respiratory failure  Intubation method: direct  Patient status: paralyzed (RSI)  Preoxygenation: BVM  Sedatives: etomidate (10mg)  Paralytic: rocuronium (80mg)  Laryngoscope size: Mac 3  Tube size: 7.5 mm  Tube type: cuffed  Number of attempts: 1  Cricoid pressure: no  Cords visualized: yes  Post-procedure assessment: CO2 detector and chest rise  Breath sounds: equal and absent over the epigastrium  Cuff inflated: yes  ETT to lip: 21 cm  ETT to teeth: 21 cm  Tube secured with: ETT juarez  Chest x-ray interpreted by me.  Chest x-ray findings: endotracheal tube too low  Tube repositioned: tube repositioned successfully  Patient tolerance: Patient tolerated the procedure well with no immediate complications  Complications: No  Estimated blood loss (mL): 0  Specimens: No  Implants: Yes  Comments: Dr. Francois directly supervised the entire procedure.          Stephanie De Leon  5/1/2020  "

## 2020-05-01 NOTE — PROCEDURES
"Sandra Oseguera is a 80 y.o. female patient.    Temp: 96.7 °F (35.9 °C) (20 1105)  Pulse: (!) 50 (20 1615)  Resp: 16 (20 1615)  BP: (!) 154/93 (20 1530)  SpO2: (!) 91 % (20 1615)  Weight: 64 kg (141 lb 1.5 oz) (20 1151)  Height: 5' 3" (160 cm) (20 1155)       Arterial Line  Date/Time: 2020 4:18 PM  Location procedure was performed: [facility] PULMONARY MEDICINE  Performed by: Stephanie De Leon DO  Authorized by: Stephanie De Leon DO   Pre-op Diagnosis: shock, acute hypoxic respiratory failure  Post-operative diagnosis: shock, acute hypoxic respiratory failure  Consent Done: Yes  Consent: Verbal consent obtained. Written consent obtained.  Risks and benefits: risks, benefits and alternatives were discussed  Consent given by: evangelina Zabala.  Patient understanding: patient states understanding of the procedure being performed  Patient consent: the patient's understanding of the procedure matches consent given  Procedure consent: procedure consent matches procedure scheduled  Relevant documents: relevant documents present and verified  Test results: test results available and properly labeled  Site marked: the operative site was marked  Imaging studies: imaging studies available  Required items: required blood products, implants, devices, and special equipment available  Patient identity confirmed: , MRN and name  Time out: Immediately prior to procedure a "time out" was called to verify the correct patient, procedure, equipment, support staff and site/side marked as required.  Preparation: Patient was prepped and draped in the usual sterile fashion.  Indications: multiple ABGs, respiratory failure and hemodynamic monitoring  Location: left radial  Patient sedated: no  Abnormal Jh's test: US confirmed ulnar collateral flow to hand.  Needle gauge: 20  Seldinger technique: Seldinger technique used  Number of attempts: 1  Complications: No  Estimated blood loss " (mL): 1  Specimens: No  Implants: Yes  Post-procedure: dressing applied  Post-procedure CMS: normal  Patient tolerance: Patient tolerated the procedure well with no immediate complications          Stephanie De Leon  5/1/2020

## 2020-05-01 NOTE — SUBJECTIVE & OBJECTIVE
Interval History: Low temperature overnight and placed on warming blanket. Poor UOP. Rate improved with amiodarone. SOB remains the same as yesterday.    Review of Systems   Constitutional: Positive for fatigue. Negative for chills and fever.   Respiratory: Positive for shortness of breath. Negative for cough.    Cardiovascular: Negative for chest pain and palpitations.   Gastrointestinal: Negative for abdominal pain and nausea.     Objective:     Vital Signs (Most Recent):  Temp: 97.8 °F (36.6 °C) (05/01/20 0705)  Pulse: 102 (05/01/20 0730)  Resp: (!) 40 (05/01/20 0730)  BP: 133/84 (05/01/20 0730)  SpO2: 95 % (05/01/20 0730) Vital Signs (24h Range):  Temp:  [96.4 °F (35.8 °C)-97.8 °F (36.6 °C)] 97.8 °F (36.6 °C)  Pulse:  [] 102  Resp:  [18-56] 40  SpO2:  [88 %-100 %] 95 %  BP: (100-156)/() 133/84     Weight: 64 kg (141 lb 1.5 oz)  Body mass index is 24.99 kg/m².    Intake/Output Summary (Last 24 hours) at 5/1/2020 0746  Last data filed at 5/1/2020 0700  Gross per 24 hour   Intake 1029.51 ml   Output 173 ml   Net 856.51 ml      Physical Exam   Constitutional: She is oriented to person, place, and time. She appears well-developed. No distress.   HENT:   Head: Normocephalic and atraumatic.   Eyes: Conjunctivae are normal. Right eye exhibits no discharge. Left eye exhibits no discharge.   Cardiovascular: Normal rate and intact distal pulses.   Pulmonary/Chest: Effort normal. She has rales.   Abdominal: Soft. There is no tenderness.   Musculoskeletal: She exhibits edema. She exhibits no tenderness.   Neurological: She is alert and oriented to person, place, and time.   Skin: Skin is dry.   Nursing note and vitals reviewed.    Significant Labs:   CBC:  Recent Labs   Lab 04/30/20  1242 05/01/20  0318   WBC 8.69 16.95*   HGB 11.5* 12.7   HCT 36.2* 41.4    131*   GRAN 76.4*  6.6 83.1*  14.1*   LYMPH 12.4*  1.1 5.1*  0.9*   MONO 10.0  0.9 10.9  1.9*   EOS 0.0 0.0   BASO 0.02 0.02     CMP:  Recent  Labs   Lab 04/30/20  1242 05/01/20  0318    141   K 4.9 6.4*    105   CO2 19* 8*   BUN 26* 33*   CREATININE 1.7* 2.3*   * 82   CALCIUM 9.9 10.0   MG 1.8  --    ALKPHOS 106 128   AST 53* 1,052*   ALT 42 689*   BILITOT 1.6* 2.6*   PROT 8.0 7.8   ALBUMIN 3.8 3.7   ANIONGAP 18* 28*       Significant Imaging:   No new imaging this morning.

## 2020-05-01 NOTE — PROGRESS NOTES
[facility] Hospital Medicine  Progress Note    Patient Name: Sandra Oseguera  MRN: [id number]  Patient Class: IP- Inpatient   Admission Date: 4/30/2020  Length of Stay: 1 days  Attending Physician: MOSES Guerra MD  Primary Care Provider: America Potts MD        Subjective:     Principal Problem:Atrial fibrillation with rapid ventricular response    HPI:  Ms. Oseguera is a 80/F with PMH A-fib (s/p DCCV 09/2019, on apixaban), HFpEF (2D Echo 09/2019 EF 60%), HTN, DMII, RA who presented to ED 04/30 with a three day history of worsening SOB. She reports she has dyspnea on exertion at baseline but feels that it has worsened over the several days prior to admission. Denies orthopnea. Reports associated palpitations in addition, and some mild dry cough. She has had some abdominal cramping and loose stool and poor PO intake over the past several days. She denies fever, chills, myalgias or sore throat. She did not take her medications the morning of presentation, but has been taking them consistently beforehand. Upon presentation to ED she was found to have atrial fibrillation with RVR with rate to the 130s-140s. She received metoprolol succinate 200mg PO and metoprolol 5mg IV x 3 without significant response. Lab evaluation was notable for DUNIA, elevated anion gap, and elevated BNP. CXR demonstrated some pulmonary edema. Hospital medicine was contacted for admission.    Overview/Hospital Course:  No notes on file    Interval History: Low temperature overnight and placed on warming blanket. Poor UOP. Rate improved with amiodarone. SOB remains the same as yesterday.    Review of Systems   Constitutional: Positive for fatigue. Negative for chills and fever.   Respiratory: Positive for shortness of breath. Negative for cough.    Cardiovascular: Negative for chest pain and palpitations.   Gastrointestinal: Negative for abdominal pain and nausea.     Objective:     Vital Signs (Most Recent):  Temp:  97.8 °F (36.6 °C) (05/01/20 0705)  Pulse: 102 (05/01/20 0730)  Resp: (!) 40 (05/01/20 0730)  BP: 133/84 (05/01/20 0730)  SpO2: 95 % (05/01/20 0730) Vital Signs (24h Range):  Temp:  [96.4 °F (35.8 °C)-97.8 °F (36.6 °C)] 97.8 °F (36.6 °C)  Pulse:  [] 102  Resp:  [18-56] 40  SpO2:  [88 %-100 %] 95 %  BP: (100-156)/() 133/84     Weight: 64 kg (141 lb 1.5 oz)  Body mass index is 24.99 kg/m².    Intake/Output Summary (Last 24 hours) at 5/1/2020 0746  Last data filed at 5/1/2020 0700  Gross per 24 hour   Intake 1029.51 ml   Output 173 ml   Net 856.51 ml      Physical Exam   Constitutional: She is oriented to person, place, and time. She appears well-developed. No distress.   HENT:   Head: Normocephalic and atraumatic.   Eyes: Conjunctivae are normal. Right eye exhibits no discharge. Left eye exhibits no discharge.   Cardiovascular: Normal rate and intact distal pulses.   Pulmonary/Chest: Effort normal. She has rales.   Abdominal: Soft. There is no tenderness.   Musculoskeletal: She exhibits edema. She exhibits no tenderness.   Neurological: She is alert and oriented to person, place, and time.   Skin: Skin is dry.   Nursing note and vitals reviewed.    Significant Labs:   CBC:  Recent Labs   Lab 04/30/20  1242 05/01/20 0318   WBC 8.69 16.95*   HGB 11.5* 12.7   HCT 36.2* 41.4    131*   GRAN 76.4*  6.6 83.1*  14.1*   LYMPH 12.4*  1.1 5.1*  0.9*   MONO 10.0  0.9 10.9  1.9*   EOS 0.0 0.0   BASO 0.02 0.02     CMP:  Recent Labs   Lab 04/30/20  1242 05/01/20 0318    141   K 4.9 6.4*    105   CO2 19* 8*   BUN 26* 33*   CREATININE 1.7* 2.3*   * 82   CALCIUM 9.9 10.0   MG 1.8  --    ALKPHOS 106 128   AST 53* 1,052*   ALT 42 689*   BILITOT 1.6* 2.6*   PROT 8.0 7.8   ALBUMIN 3.8 3.7   ANIONGAP 18* 28*       Significant Imaging:   No new imaging this morning.      Assessment/Plan:      * Atrial fibrillation with rapid ventricular response  - A-Fib RVR with rate to 120s-140s. Skin cool to  touch at time of initial evaluation. Started on amiodarone gtt 04/30.  - Continuing apixaban 5mg PO BID.  - Discussed with cardiology; planning for DCCV today.  - Appreciate cardiology assistance.    Cardiogenic shock  - Normotensive, but with poor perfusion yesterday and hypothermic overnight.  - Suspect secondary to arrhythmia. 2D Echo pending. Repeat troponin, lactate. Check procalcitonin to better differentiate, though low suspicion for infectious process.  - Evidence of shock with worsened metabolic acidosis, shock liver.  - As above.    DUNIA (acute kidney injury)  - DUNIA likely secondary to hypoperfusion in setting of CHF exacerbation / AFib-RVR.  - DUNIA worsened with worsened anion gap metabolic acidosis. Repeat lactate. Check urine studies.  - Nephrology consult.    Chronic anticoagulation  - As under A-fib RVR.    Rheumatoid arthritis  - Hold methotrexate for time being.    Hypercholesterolemia  - Atorvastatin 20mg PO daily held in setting of LFT elevation.    Acute on chronic diastolic heart failure  - Acute on chronic diastolic heart failure with A-fib RVR.  - CXR with pulmonary edema, BNP elevated.  - Repeat 2D Echo.  - Poor urine output overnight and worsened renal function; discuss diuresis with nephrology prior to proceeding.  - Strict intake/output, daily weights.    CKD (chronic kidney disease) stage 3, GFR 30-59 ml/min  - As under DUNIA.    Type 2 diabetes mellitus, with long-term current use of insulin  - On insulin detemir 15U subQ daily, linagliptin 5mg PO daily at home.  - Continue insulin detemir 7U subQ qHS, start low-dose sliding scale insulin aspart 0-5U subQ TIDWM PRN.    Hypertension  - Hold benazepril 20mg PO daily, metoprolol 200mg PO daily in setting of shock.    Shortness of breath  - On umeclidinium/vilanterol, albuterol at home.  - Continuing fluticasone-vilanterol 200-25mcg inhaled daily, albuterol-ipratropium 2.5-0.5mg inhaled q4hr PRN while inpatient.    VTE Risk Mitigation (From  admission, onward)         Ordered     apixaban tablet 5 mg  2 times daily      04/30/20 1831     IP VTE HIGH RISK PATIENT  Once      04/30/20 1831     Place sequential compression device  Until discontinued      04/30/20 1831              Critical care time spent on the evaluation and treatment of severe organ dysfunction, review of pertinent labs and imaging studies, discussions with consulting providers and discussions with patient/family: 35 minutes.    RACHEL Guerra MD  Department of Hospital Medicine   [facility]

## 2020-05-01 NOTE — NURSING
Pt converted to sinus bradycardia, HR 46-49.  Pt asymptomatic.  Dr. Calderon came to bedside.  States no need now for cardioversion.  States he will put in new medication orders.  Will continue to monitor.

## 2020-05-01 NOTE — ASSESSMENT & PLAN NOTE
-con't apixaban; if she deteriorates and is likely to require procedures, changing to heparin may be required

## 2020-05-01 NOTE — SIGNIFICANT EVENT
05/01/2020  1345    Called by nursing to bedside. Dr. De Leon with Critical Care at bedside. Patient with low SpO2s and placed on BiPAP initially without significant improvement in saturations, then with decreasing blood pressures. Norepinephrine gtt ordered. Pulses assessed at femorals and carotids; not palpable. Code Blue called and compressions initiated. Epinephrine x 1 administered. Pulses not easily palpated, but patient became more interactive / resisting resuscitative efforts and CPR discontinued after 1 round. Dr. Francois to bedside and patient intubated with Nehemiah De Leon / Priscila. Daughter Dejuan had been contacted for arterial line placement consent and asked for her granddaughter Jerilyn to be contacted for consent. Jerilyn contacted for central line consent in addition given pressor support.    MOSES Deng MD  Department of Hospital Medicine   [facility]  [phone]

## 2020-05-01 NOTE — CONSULTS
"  [facility]  Adult Nutrition  Consult Note    SUMMARY     Recommendations    1. Recommend Renal diet.   2. Encourage PO intake of meals and commercial beverage.   3. If PO intake <50% of meals recommend Nepro BID.   4. Weekly weights.     Goals: 1. >75% of EEN, EPN by LEONEL marx up.   Nutrition Goal Status: new  Communication of LEONEL Recs: other (comment)(POC)    Reason for Assessment    Reason For Assessment: consult  Diagnosis: (Atrial fibrillation with rapid ventricular response)  Relevant Medical History: HTN, DM2, CKD2, GERD  Interdisciplinary Rounds: did not attend  General Information Comments:   5.1.20- Pt is a 80 year old female admitted with SOB and poor PO  x 3 day. Pt is on 2000kcal diabetic/cardiac diet. PO intake 0-25% of meals. UBW 130lbs, +11lbs x 1 day. Pt needs reweight. Due to recent hospital wide restrictions to limit the transfer of COVID-19, we are not performing any physical exams at this time. All S/S will be observational; NFPE to be performed at a future date.  Nutrition Discharge Planning: Adequate nutrition to meet needs via Renal/ Diabetic diet.     Nutrition Risk Screen    Nutrition Risk Screen: no indicators present    Nutrition/Diet History    Spiritual, Cultural Beliefs, Latter-day Practices, Values that Affect Care: no    Anthropometrics    Temp: 96.7 °F (35.9 °C)  Height Method: Stated  Height: 5' 3" (160 cm)  Height (inches): 63 in  Weight Method: Bed Scale  Weight: 64 kg (141 lb 1.5 oz)  Weight (lb): 141.1 lb  Ideal Body Weight (IBW), Female: 115 lb  % Ideal Body Weight, Female (lb): 122.7 %  BMI (Calculated): 25  BMI Grade: 25 - 29.9 - overweight  Usual Body Weight (UBW), k kg  % Usual Body Weight: 108.7  % Weight Change From Usual Weight: 8.47 %     Lab/Procedures/Meds    Pertinent Labs Reviewed: reviewed  Pertinent Labs Comments: K 6.4, BUN 33, Cr 2.3, A1C 8.3  Pertinent Medications Reviewed: reviewed  Pertinent Medications Comments: Insulin, " Vanc    Estimated/Assessed Needs    Weight Used For Calorie Calculations: 64 kg (141 lb 1.5 oz)  Energy Calorie Requirements (kcal): 5952-9888 kcals/day(23-25 kcals/day CKD)  Energy Need Method: Kcal/kg  Protein Requirements: 51.31 g/day(0.8 g/kg CKD)  Weight Used For Protein Calculations: 64 kg (141 lb 1.5 oz)  Fluid Requirements (mL): 1mL/kcal or per MD  Estimated Fluid Requirement Method: RDA Method  RDA Method (mL): 1472     Nutrition Prescription Ordered    Current Diet Order: 200 Diabetic/Cardiac    Evaluation of Received Nutrient/Fluid Intake    Energy Calories Required: not meeting needs  Protein Required: not meeting needs  Fluid Required: meeting needs  Comments: LBM 4.30.20  % Intake of Estimated Energy Needs: 0 - 25 %  % Meal Intake: 0 - 25 %    Nutrition Risk    Level of Risk/Frequency of Follow-up: low     Assessment and Plan    CKD (chronic kidney disease) stage 3, GFR 30-59 ml/min  Contributing Nutrition Diagnosis  Altered nutrition related lab values    Related to (etiology):   Kidney disfuction    Signs and Symptoms (as evidenced by):   Renal Labs    Interventions (treatment strategy):  Collaboration wiht other providers    Nutrition Diagnosis Status:   New    Monitor and Evaluation    Food and Nutrient Intake: food and beverage intake  Food and Nutrient Adminstration: diet order  Knowledge/Beliefs/Attitudes: food and nutrition knowledge/skill  Physical Activity and Function: nutrition-related ADLs and IADLs  Anthropometric Measurements: weight  Biochemical Data, Medical Tests and Procedures: electrolyte and renal panel, lipid profile, glucose/endocrine profile  Nutrition-Focused Physical Findings: overall appearance     Malnutrition Assessment     Ken Score: 21  Due to recent hospital wide restrictions to limit the transfer of COVID-19, we are not performing any physical exams at this time. All S/S will be observational; NFPE to be performed at a future date.     Nutrition Follow-Up    RD  Follow-up?: Yes

## 2020-05-01 NOTE — PLAN OF CARE
Assessed pt for spiritual distress.  None was reported nor presented.  Pt reported strong family and marixa support, and gratitude that she was feeling better and that her improved conditions had made a procedure planned for earlier today unnecessary.  Pt requested prayer, and was so led by .  Pt was grateful for her spiritual wellness check, and follow-up care was offered upon request.

## 2020-05-01 NOTE — ASSESSMENT & PLAN NOTE
Elevated troponin likely due to acute strain of RVR previously. Unrecognized ACS seems less likely without ischemic changes on EKG. Troponin leak associated with an infection causing critical illness is also possible.  -con't to monitor

## 2020-05-01 NOTE — EICU
Rounding (Video Assessment):  Yes    Intervention Initiated From:  Bedside    Domingo Communicated with Bedside Nurse regarding:  Other    Nurse Notified:  Yes    Doctor Notified:  Yes Dr. Ceasar Alejandra supervising Dr. De Leon    Comments: 1348 continue with %  1349 Etomidate 10 mg IVP  1349 Rocurium 40 mg IVP  1351 Mac 3 inserted visualized airway placement  1352 Inserted 7.54 @ 23 lip  1352 color metric color change  1353 Bilateral breath sounds ausculated.commerical appliance applied connectedted to vent  1555 Cuming sump # 14 OG per No Ann, RN  1409 US used for inserted of left arterial line  1410 Area cleansed with chlora prep. Sterile towels around left wrist. Sterile probe cover placed over US, Locating left radial artery  1412 # 20 angiocath inserted, connected to pressure line. Calibrated and zero line.   1416 biopatch applied. Secured down with tape and tega derm. Patient sedated. Toerated ETT and arterial line with no facial expressions of pain. VSS P 52 B/P 100/50(67)

## 2020-05-01 NOTE — ASSESSMENT & PLAN NOTE
Worsened lactic acidosis. On metformin PTA, but this has worsened since admission, making this a less likely culprit.  -serial lactate levels

## 2020-05-01 NOTE — ASSESSMENT & PLAN NOTE
Contributing Nutrition Diagnosis  Altered nutrition related lab values    Related to (etiology):   kidney disfuction    Signs and Symptoms (as evidenced by):   Renal Labs    Interventions (treatment strategy):  Collaboration wiht other providers    Nutrition Diagnosis Status:   New

## 2020-05-01 NOTE — NURSING
Unable to  SpO2 with pulse ox tried on many different sites.  SpO2 on ABG shows 99%.  Dr. De Leon and Dr. Guerra at bedside and aware of inability to  pulse ox.  Will continue to monitor.

## 2020-05-01 NOTE — ASSESSMENT & PLAN NOTE
- On insulin detemir 15U subQ daily, linagliptin 5mg PO daily at home.  - Continue insulin detemir 7U subQ qHS, start low-dose sliding scale insulin aspart 0-5U subQ TIDWM PRN.

## 2020-05-01 NOTE — PROGRESS NOTES
[facility]  Cardiology  Progress Note    Patient Name: Sandra Oseguera  MRN: [id number]  Admission Date: 4/30/2020  Hospital Length of Stay: 1 days  Code Status: Full Code   Attending Physician: MOSES Guerra MD   Primary Care Physician: America Potts MD  Expected Discharge Date:   Principal Problem:Atrial fibrillation with rapid ventricular response    Subjective:     Brief HPI:    Sandra Oseguera is a 80 y.o.female with hypertension, hypercholesterolemia and diabetes mellitus type 2. She has a healthy weight. She has rheumatoid arthritis involving knees, hands and back. In the summer of 2013 she developed progressive exertional dyspnea occasionally associated with mild chest pressure. She had an Echocardiogram that revealed findings consistent with hypertensive heart disease with a high LVEDP. She had low exercise tolerance on a Stress MPI but no defects. She was began on furosemide and she was breathing easier. There was no wheezing. She had no exertional chest pain but mild to moderate exertional dyspnea if walking fast. Her heart rate was 70-80 bpm at rest and no longer raced with activities. As it appeared her exertional dyspnea was out of proportion to her cardiac findings she was referrred for PFTs that were done on 1/13/2017. They revealed small airway obstruction without significant response to inhalers. She saw Dr. Jarek Wade and was prescribed Anoro inhalers and a rescue inhaler. She also got enrolled in pulmonary rehabilitation and did 25 sessions. She was breathing better and thought her legs held her back more than her breathing. In late 8/2019 she noted palpitations and then slowly got more and more short of breath. She was seen by Dr. Mauricio Sood on 9/11/2019 and was noted to be in atrial fibrillation with moderately fast VRR. She was admitted, diuresed and anticoagulated. The VRR was controlled with metoprolol. On 9/12/2019 she had an Echo that revealed  normal left ventricular size with mild systolic dysfunction. The EF was 45%. There was moderate LVH and the LA was moderately dilated. There was moderate MR and severe TR with the SPAP being elevated to 67 mmHg. On 9/13/2019 she underwent a CRISTELA guided CV. The DONATO had mild spontaneous echocardiographic contrast and there was moderate MR. She was converted to sinus with a 100 J shock. She was feeling better in sinus rhythm. She has not had any clinical recurrence until recently. On about 4/27/2020 she noted that she was short of breath with minimal activities. Her dyspnea got worse and she noted that her heart was racing with minimal activities. She was advised to go to the ER and was in atrial fibrillation with fast VRR and in heart failure. She was admitted.     Hospital Course:    4/30/2020: Began amiodarone.    5/1/2020: Converted to sinus rhythm.    Interval History:    Converted to sinus rhythm this morning just as she was about to be cardioverted. Breathing easier.    Review of Systems   Constitution: Positive for malaise/fatigue. Negative for chills and fever.   HENT: Negative for nosebleeds.    Eyes: Negative for vision loss in left eye and vision loss in right eye.   Cardiovascular: Positive for orthopnea and paroxysmal nocturnal dyspnea. Negative for chest pain, leg swelling and palpitations.   Respiratory: Positive for shortness of breath. Negative for cough, hemoptysis, sputum production and wheezing.    Hematologic/Lymphatic: Negative for bleeding problem.   Skin: Negative for rash.   Musculoskeletal: Negative for myalgias.   Gastrointestinal: Negative for abdominal pain, heartburn, hematemesis, hematochezia, jaundice, melena, nausea and vomiting.   Genitourinary: Negative for hematuria.   Neurological: Negative for dizziness, headaches, light-headedness, vertigo and weakness.   Psychiatric/Behavioral: Negative for altered mental status. The patient is not nervous/anxious.    Allergic/Immunologic:  Negative for persistent infections.     Objective:     Vital Signs (Most Recent):  Temp: 97.8 °F (36.6 °C) (05/01/20 0705)  Pulse: 96 (05/01/20 0824)  Resp: (!) 22 (05/01/20 0824)  BP: (!) 127/90 (05/01/20 0800)  SpO2: 98 % (05/01/20 0824) Vital Signs (24h Range):  Temp:  [96.4 °F (35.8 °C)-97.8 °F (36.6 °C)] 97.8 °F (36.6 °C)  Pulse:  [] 96  Resp:  [18-56] 22  SpO2:  [88 %-100 %] 98 %  BP: (100-156)/() 127/90     Weight: 64 kg (141 lb 1.5 oz)  Body mass index is 24.99 kg/m².    SpO2: 98 %  O2 Device (Oxygen Therapy): nasal cannula w/ humidification      Intake/Output Summary (Last 24 hours) at 5/1/2020 0842  Last data filed at 5/1/2020 0700  Gross per 24 hour   Intake 1029.51 ml   Output 173 ml   Net 856.51 ml       Lines/Drains/Airways     Drain                 Urethral Catheter 05/01/20 0040 less than 1 day          Peripheral Intravenous Line                 Peripheral IV - Single Lumen 04/30/20 1236 20 G Right Antecubital less than 1 day         Peripheral IV - Single Lumen 04/30/20 1727 20 G Left Forearm less than 1 day                Physical Exam   Constitutional: She is oriented to person, place, and time. She appears well-developed and well-nourished. She appears ill. No distress.   Eyes: Right conjunctiva is not injected. Right conjunctiva has no hemorrhage. Left conjunctiva is not injected. Left conjunctiva has no hemorrhage.   Neck: No JVD present.   Cardiovascular: Regular rhythm, S1 normal and S2 normal. Bradycardia present. Exam reveals no gallop.   Murmur heard.  High-pitched blowing holosystolic murmur is present at the apex.  Pulmonary/Chest: Effort normal. She has rales in the right lower field and the left lower field.   Abdominal: Normal appearance. There is no tenderness.   Musculoskeletal:        Right ankle: She exhibits no swelling.        Left ankle: She exhibits no swelling.   Neurological: She is alert and oriented to person, place, and time. She is not disoriented.   Skin:  Skin is warm and dry. No rash noted.   Psychiatric: She has a normal mood and affect. Her speech is normal and behavior is normal. Cognition and memory are normal.     Current Medications:     amiodarone  200 mg Oral TID AC    apixaban  5 mg Oral BID    fluticasone furoate-vilanteroL  1 puff Inhalation Daily    insulin detemir U-100  7 Units Subcutaneous QHS    metoprolol tartrate  25 mg Oral QID     Current Laboratory Results:    Recent Results (from the past 24 hour(s))   CBC auto differential    Collection Time: 04/30/20 12:42 PM   Result Value Ref Range    WBC 8.69 3.90 - 12.70 K/uL    RBC 3.64 (L) 4.00 - 5.40 M/uL    Hemoglobin 11.5 (L) 12.0 - 16.0 g/dL    Hematocrit 36.2 (L) 37.0 - 48.5 %    Mean Corpuscular Volume 100 (H) 82 - 98 fL    Mean Corpuscular Hemoglobin 31.6 (H) 27.0 - 31.0 pg    Mean Corpuscular Hemoglobin Conc 31.8 (L) 32.0 - 36.0 g/dL    RDW 16.7 (H) 11.5 - 14.5 %    Platelets 154 150 - 350 K/uL    MPV 14.7 (H) 9.2 - 12.9 fL    Immature Granulocytes 0.8 (H) 0.0 - 0.5 %    Gran # (ANC) 6.6 1.8 - 7.7 K/uL    Immature Grans (Abs) 0.07 (H) 0.00 - 0.04 K/uL    Lymph # 1.1 1.0 - 4.8 K/uL    Mono # 0.9 0.3 - 1.0 K/uL    Eos # 0.0 0.0 - 0.5 K/uL    Baso # 0.02 0.00 - 0.20 K/uL    nRBC 0 0 /100 WBC    Gran% 76.4 (H) 38.0 - 73.0 %    Lymph% 12.4 (L) 18.0 - 48.0 %    Mono% 10.0 4.0 - 15.0 %    Eosinophil% 0.2 0.0 - 8.0 %    Basophil% 0.2 0.0 - 1.9 %    Differential Method Automated    Comprehensive metabolic panel    Collection Time: 04/30/20 12:42 PM   Result Value Ref Range    Sodium 141 136 - 145 mmol/L    Potassium 4.9 3.5 - 5.1 mmol/L    Chloride 104 95 - 110 mmol/L    CO2 19 (L) 23 - 29 mmol/L    Glucose 207 (H) 70 - 110 mg/dL    BUN, Bld 26 (H) 8 - 23 mg/dL    Creatinine 1.7 (H) 0.5 - 1.4 mg/dL    Calcium 9.9 8.7 - 10.5 mg/dL    Total Protein 8.0 6.0 - 8.4 g/dL    Albumin 3.8 3.5 - 5.2 g/dL    Total Bilirubin 1.6 (H) 0.1 - 1.0 mg/dL    Alkaline Phosphatase 106 55 - 135 U/L    AST 53 (H) 10 - 40  U/L    ALT 42 10 - 44 U/L    Anion Gap 18 (H) 8 - 16 mmol/L    eGFR if African American 33 (A) >60 mL/min/1.73 m^2    eGFR if non African American 28 (A) >60 mL/min/1.73 m^2   C-Reactive Protein    Collection Time: 04/30/20 12:42 PM   Result Value Ref Range    CRP 12.6 (H) 0.0 - 8.2 mg/L   CK    Collection Time: 04/30/20 12:42 PM   Result Value Ref Range    CPK 43 20 - 180 U/L   Troponin I    Collection Time: 04/30/20 12:42 PM   Result Value Ref Range    Troponin I 0.051 (H) 0.000 - 0.026 ng/mL   COVID-19 Rapid Screening    Collection Time: 04/30/20 12:42 PM   Result Value Ref Range    SARS-CoV-2 RNA, Amplification, Qual Negative Negative   Procalcitonin    Collection Time: 04/30/20 12:42 PM   Result Value Ref Range    Procalcitonin 0.03 <0.25 ng/mL   Brain Natriuretic Peptide    Collection Time: 04/30/20 12:42 PM   Result Value Ref Range     (H) 0 - 99 pg/mL   Magnesium    Collection Time: 04/30/20 12:42 PM   Result Value Ref Range    Magnesium 1.8 1.6 - 2.6 mg/dL   Hemoglobin A1c    Collection Time: 04/30/20  6:01 PM   Result Value Ref Range    Hemoglobin A1C 8.3 (H) 4.0 - 5.6 %    Estimated Avg Glucose 192 (H) 68 - 131 mg/dL   POCT glucose    Collection Time: 04/30/20  8:33 PM   Result Value Ref Range    POCT Glucose 82 70 - 110 mg/dL   POCT glucose    Collection Time: 04/30/20 11:58 PM   Result Value Ref Range    POCT Glucose 67 (L) 70 - 110 mg/dL   POCT glucose    Collection Time: 05/01/20 12:47 AM   Result Value Ref Range    POCT Glucose 113 (H) 70 - 110 mg/dL   Basic metabolic panel    Collection Time: 05/01/20  3:18 AM   Result Value Ref Range    Sodium 141 136 - 145 mmol/L    Potassium 6.4 (HH) 3.5 - 5.1 mmol/L    Chloride 105 95 - 110 mmol/L    CO2 8 (LL) 23 - 29 mmol/L    Glucose 82 70 - 110 mg/dL    BUN, Bld 33 (H) 8 - 23 mg/dL    Creatinine 2.3 (H) 0.5 - 1.4 mg/dL    Calcium 10.0 8.7 - 10.5 mg/dL    Anion Gap 28 (H) 8 - 16 mmol/L    eGFR if African American 23 (A) >60 mL/min/1.73 m^2    eGFR if  non African American 20 (A) >60 mL/min/1.73 m^2   Hepatic function panel    Collection Time: 05/01/20  3:18 AM   Result Value Ref Range    Total Protein 7.8 6.0 - 8.4 g/dL    Albumin 3.7 3.5 - 5.2 g/dL    Total Bilirubin 2.6 (H) 0.1 - 1.0 mg/dL    Bilirubin, Direct 1.4 (H) 0.1 - 0.3 mg/dL    AST 1,052 (H) 10 - 40 U/L     (H) 10 - 44 U/L    Alkaline Phosphatase 128 55 - 135 U/L   CBC auto differential    Collection Time: 05/01/20  3:18 AM   Result Value Ref Range    WBC 16.95 (H) 3.90 - 12.70 K/uL    RBC 3.97 (L) 4.00 - 5.40 M/uL    Hemoglobin 12.7 12.0 - 16.0 g/dL    Hematocrit 41.4 37.0 - 48.5 %    Mean Corpuscular Volume 104 (H) 82 - 98 fL    Mean Corpuscular Hemoglobin 32.0 (H) 27.0 - 31.0 pg    Mean Corpuscular Hemoglobin Conc 30.7 (L) 32.0 - 36.0 g/dL    RDW 17.2 (H) 11.5 - 14.5 %    Platelets 131 (L) 150 - 350 K/uL    MPV 13.9 (H) 9.2 - 12.9 fL    Immature Granulocytes 0.8 (H) 0.0 - 0.5 %    Gran # (ANC) 14.1 (H) 1.8 - 7.7 K/uL    Immature Grans (Abs) 0.13 (H) 0.00 - 0.04 K/uL    Lymph # 0.9 (L) 1.0 - 4.8 K/uL    Mono # 1.9 (H) 0.3 - 1.0 K/uL    Eos # 0.0 0.0 - 0.5 K/uL    Baso # 0.02 0.00 - 0.20 K/uL    nRBC 0 0 /100 WBC    Gran% 83.1 (H) 38.0 - 73.0 %    Lymph% 5.1 (L) 18.0 - 48.0 %    Mono% 10.9 4.0 - 15.0 %    Eosinophil% 0.0 0.0 - 8.0 %    Basophil% 0.1 0.0 - 1.9 %    Aniso Slight     Poly Occasional     Tear Drop Cells Moderate     Andrey Cells Occasional     Schistocytes Present     Differential Method Automated    Uric acid    Collection Time: 05/01/20  3:18 AM   Result Value Ref Range    Uric Acid 11.9 (H) 2.4 - 5.7 mg/dL   POCT glucose    Collection Time: 05/01/20  5:22 AM   Result Value Ref Range    POCT Glucose 194 (H) 70 - 110 mg/dL   POCT glucose    Collection Time: 05/01/20  5:41 AM   Result Value Ref Range    POCT Glucose 146 (H) 70 - 110 mg/dL   Lactic Acid, Plasma    Collection Time: 05/01/20  7:39 AM   Result Value Ref Range    Lactate (Lactic Acid) >12.0 (HH) 0.5 - 2.2 mmol/L    Troponin I    Collection Time: 05/01/20  7:39 AM   Result Value Ref Range    Troponin I 0.150 (H) 0.000 - 0.026 ng/mL     Current Imaging Results:    X-Ray Chest AP Portable   Final Result      Findings suggestive of CHF and pulmonary edema.         Electronically signed by: Leroy Romero Jr   Date:    04/30/2020   Time:    13:58          Assessment and Plan:     Problem List:    Active Diagnoses:    Diagnosis Date Noted POA    PRINCIPAL PROBLEM:  Atrial fibrillation with rapid ventricular response [I48.91] 09/11/2019 Yes    Acute on chronic diastolic heart failure [I50.33] 03/03/2014 Yes    High risk medication use [Z79.899] 04/30/2020 Not Applicable    Chronic anticoagulation [Z79.01] 09/13/2019 Not Applicable     Chronic    Right bundle branch block [I45.10] 09/12/2018 Yes     Chronic    Shortness of breath [R06.02] 02/03/2014 Yes    Hypertension [I10] 02/03/2014 Yes     Chronic    Hypercholesterolemia [E78.00] 12/01/2014 Yes     Chronic    Type 2 diabetes mellitus, with long-term current use of insulin [E11.9, Z79.4] 02/03/2014 Not Applicable     Chronic    Rheumatoid arthritis [M06.9] 12/01/2014 Yes     Chronic    CKD (chronic kidney disease) stage 3, GFR 30-59 ml/min [N18.3] 02/03/2014 Yes     Chronic    Elevated troponin [R79.89] 05/01/2020 Yes    Increased anion gap metabolic acidosis [E87.2] 05/01/2020 Yes    Transaminitis [R74.0] 05/01/2020 Yes    Hyperkalemia [E87.5] 05/01/2020 Yes    Cardiogenic shock [R57.0] 05/01/2020 Yes    DUNIA (acute kidney injury) [N17.9] 04/30/2020 Yes     Chronic      Problems Resolved During this Admission:     Assessment and Plan:    1. Heart Failure, Diastolic, Acute on Chronic              1/10/2014: CXR: WNL.              2/13/2014: Echo: Normal LV size and function. Mild LVH. Moderate diastolic dysfunction. Elevated LVEDP. Mildly dilated LA.              2/13/2014: Stress MPI: 3:30 min. No CP. Low exercise tolerance. ECG negative. MPI negative.               Suspect exertional dyspnea related to diastolic dysfunction and high LVEDP as well as her pulmonary disease.              Significantly less short of breath since on furosemide 20 mg Q24.              As heart rate appeared to go up with exertion to a level where she felt palpitations increased dose of metoprolol to 100 mg Q24.              9/11/2019: Presents in HF probably due to atrial fibrillation.              9/12/2019: Echo: Normal left ventricular size with mild systolic dysfunction. EF 45%. Moderate LVH. Moderately dilated LA. Moderate MR. Severe TR. SPAP 67 mmHg.               On benazepril 20 mg Q24 and furosemide 40 mg Q24.              4/30/2020: Presented in HF. Probably due to atrial fibrillation with fast VRR.   Diuresis.     2. Atrial Fibrillation              8/2019: Suspect onset of persistent atrial fibrillation with moderately fast VRR.              [id number]=2 (BDG4VVa).              On metoprolol 100 mg - increased to 200 mg Q24.              Anticoagulation with apixiban 5 mg Q12.              9/13/2019: [facility]BC: CRISTELA & CV: DONATO: Mild spontaneous echocardiographic contrast. Moderate MR.  J to sinus rhythm.               On metoprolol 200 mg Q24.   4/30/2020: Recurrence. Begin amiodarone.              5/1/2020: Converted to sinus rhythm.   On amiodarone 200 mg Q8.   Reduce metoprolol to 25 mg Q12 beginning tonight (She received 200 mg 4/30/2020 in afternoon).     3. High Risk Medication              4/30/2020: Began amiodarone iv and po.   On amiodarone 200 mg Q8.                4. Chronic Anticoagulation              8/2019: Suspect onset of persistent atrial fibrillation with moderately fast VRR.              [id number]=8 (BDI3OQv).              On apixiban 5 mg Q12.              No aspirin or NSAID.     5. Right Bundle Branch Block              2018: Diagnosed with RBBB.     6. Shortness of Breath              8/2013: Began experience exertional dyspnea.              1/13/2017:  PFTs: Small airway obstruction without significant response to inhalers.              On Anoro inhaler and Ventoline and was in pulmonary rehabilitation program.              Stable.              Dr. Jarek Wade.     7. Hypertension              1992: Diagnosed.              At home was on metoprolol 200 mg Q24, benazepril 20 mg Q24 and furosemide 40 mg Q24.              Keeping log at home.              Well controlled.                 8. Hypercholesterolemia              1992: Diagnosed.              On atorvastatin 20 mg Q24.              Tells me well controlled.     9. Diabetes Mellitus, Type 2              1992: Diagnosed. Complications: CKD2. Medications: Insulin and oral agent.              On insulin and Trajenta.              No aspirin as anticoagulated.              Well controlled.     10. Chronic Kidney Disease              3/3/2015: BUN/crea 15/1.0. CrCl 65.   5/1/2020: BUN/crea 33/2.3.               11. Rheumatoid Arthritis.              2006: Diagnosed. Involvement: Hands, knees and back.              On methotrexate and folic acid.              Dr. Ezequiel Figueroa Jr..     12. History of Eye Surgery              3/10/2020: Eye surgery.              Dr. Ja Rao.    13. Lactate >12.   Hypoperfusion due to atrial fibrillation and diastolic heart failure in setting of pulmonary hypertension as a sole cause appears unlikely.   Procalcitonin 0.03.      14. Primary Care              Dr. America Potts.       VTE Risk Mitigation (From admission, onward)         Ordered     apixaban tablet 5 mg  2 times daily      04/30/20 1831     IP VTE HIGH RISK PATIENT  Once      04/30/20 1831     Place sequential compression device  Until discontinued      04/30/20 1831                Garrett Calderon MD  Cardiology  [facility]

## 2020-05-01 NOTE — ASSESSMENT & PLAN NOTE
DUNIA on CKD, likely due to acute illness causing shock. Complicated by hyperkalemia  -con't to monitor  -renally dose meds & avoid nephrotoxic meds

## 2020-05-01 NOTE — ASSESSMENT & PLAN NOTE
Hyperkalemia likely 2/2 AGMA and DUNIA on CKD  -serial BMPs  -con't bicarb infusion for now  -may need additional therapy to manage her chronic hyperkalemia

## 2020-05-01 NOTE — EICU
Warming blanket ordered for T 96,4.      11.40 pm.  Pt is unable to urinate, Vides catheter ordered.    4.10 am   K 6.4,  Bicarb 8, creatinine 2.3>>1.7  Shift therapy ordered, bicarb qtt started.

## 2020-05-02 PROBLEM — J96.01 ACUTE RESPIRATORY FAILURE WITH HYPOXEMIA: Status: ACTIVE | Noted: 2020-01-01

## 2020-05-02 PROBLEM — R65.21 SEPTIC SHOCK: Status: ACTIVE | Noted: 2020-01-01

## 2020-05-02 PROBLEM — T68.XXXA HYPOTHERMIA: Status: ACTIVE | Noted: 2020-01-01

## 2020-05-02 PROBLEM — D65 DIC (DISSEMINATED INTRAVASCULAR COAGULATION): Status: ACTIVE | Noted: 2020-01-01

## 2020-05-02 PROBLEM — A41.9 SEPTIC SHOCK: Status: ACTIVE | Noted: 2020-01-01

## 2020-05-02 PROBLEM — G93.41 METABOLIC ENCEPHALOPATHY: Status: ACTIVE | Noted: 2020-01-01

## 2020-05-02 PROBLEM — K72.00 SHOCK LIVER: Status: ACTIVE | Noted: 2020-01-01

## 2020-05-02 NOTE — PROGRESS NOTES
Pt received intubated and on the ventilator this shift. 0750 ABG was obtained;results were shown to Dr. Hernandez;changes were made to vent. 1100 Assisted with transporting pt to CT;transport was successful. Returned pt @ approximately 1150, placed pt on Drager ventilator.   1200 Pt became bradycardic and hypoxic for a brief moment but quickly recovered. 1215 ABG was done;results were shown to Dr. Hernandez;FIO2 was decreased. Sputum sample was obtained @ 1400 and sent to lab. Treatments were given and tolerated well. No other changes or interventions were made. Will continue to monitor.

## 2020-05-02 NOTE — ASSESSMENT & PLAN NOTE
- Shock initially believed to be cardiogenic but no significant improvement after rhythm converted to sinus 05/01 with amiodarone. Suspect septic, though not febrile on presentation and no significant symptoms, though on methotrexate outpatient. Unclear etiology. Placed on empiric antibiotic therapy 05/01.  - Decompensation afternoon 05/01 with unresponsive episode and lose of pulse with bradycardia. ROSC with 1 cycle CPR and epinephrine x1. Intubated, A-line and central line placed. Started on pressor support with norepinephrine and vasopressin gtts.  - CT Chest/Abd/Pelvis today to further evaluate for potential source. CXR more consistent with pulmonary edema 2/2 CHF exacerbation on presentation, UA unremarkable for infection but U/S retroperitoneal with some perinephric stranding.  - Lactate persistently >12. Trend.  - Continuing broad spectrum empiric antibiotic coverage with azithromycin 500mg IV q24hr, cefepime 2g IV q8hr, vancomycin IV with pharmacy dosing consult.  - Appreciate critical care assistance.

## 2020-05-02 NOTE — ASSESSMENT & PLAN NOTE
- On insulin detemir 15U subQ daily, linagliptin 5mg PO daily at home.  - NPO, but receiving D5 as base for some continuous gtts.  - Continue insulin detemir at 10U subQ qHS, moderate-dose sliding scale insulin aspart 1-10U subQ q6hr PRN.  - Minimize D5 base as feasible for IV infusions.

## 2020-05-02 NOTE — PROGRESS NOTES
Pharmacokinetic Assessment Follow Up: IV Vancomycin    Vancomycin serum concentration assessment(s):    The random level was drawn correctly and can be used to guide therapy at this time. The measurement is within the desired definitive target range of 10 to 20 mcg/mL.    Vancomycin Regimen Plan:    Patient is now on CRRT, will dose per protocol with 1250mg.   Re-dose when the random level is less than 20 mcg/mL, next level to be drawn at 0430 on 05/3.     Drug levels (last 3 results):  Recent Labs   Lab Result Units 05/02/20  0403   Vancomycin, Random ug/mL 17.2       Pharmacy will continue to follow and monitor vancomycin.    Please contact pharmacy at extension [phone] for questions regarding this assessment.    Thank you for the consult,   America Stroud       Patient brief summary:  Sandra Oseguera is a 80 y.o. female initiated on antimicrobial therapy with IV Vancomycin for treatment of pneumonia.     The patient's current regimen is CRRT pulse dosing.     Drug Allergies:   Review of patient's allergies indicates:   Allergen Reactions    Codeine Nausea And Vomiting     States can take oxycodone and hydrocodone    Sulfa (sulfonamide antibiotics) Nausea And Vomiting       Actual Body Weight:   61.8kg     Renal Function:   Estimated Creatinine Clearance: 15.7 mL/min (A) (based on SCr of 2.4 mg/dL (H)).,     Dialysis Method (if applicable):  CRRT    CBC (last 72 hours):  Recent Labs   Lab Result Units 04/30/20  1242 04/30/20  1801 05/01/20  0318 05/02/20  0403   WBC K/uL 8.69  --  16.95* 46.89*   Hemoglobin g/dL 11.5*  --  12.7 11.7*   Hemoglobin A1C %  --  8.3*  --   --    Hematocrit % 36.2*  --  41.4 35.6*   Platelets K/uL 154  --  131* 112*   Gran% % 76.4*  --  83.1* 87.5*   Lymph% % 12.4*  --  5.1* 3.3*   Mono% % 10.0  --  10.9 6.1   Eosinophil% % 0.2  --  0.0 0.2   Basophil% % 0.2  --  0.1 0.3   Differential Method  Automated  --  Automated Automated       Metabolic Panel (last 72 hours):  Recent  Labs   Lab Result Units 04/30/20  1242 05/01/20  0318 05/01/20  0730 05/01/20  1222 05/01/20  1223 05/01/20  1625 05/01/20  2320 05/02/20  0403 05/02/20  0547   Sodium mmol/L 141 141  --  139 140 134* 133* 133* 133*   Sodium Urine Random mmol/L  --   --  38  --   --   --   --   --   --    Potassium mmol/L 4.9 6.4*  --  5.5* 5.6* 5.8* 4.6 4.4 4.3   Chloride mmol/L 104 105  --  99 100 95 94* 95 96   CO2 mmol/L 19* 8*  --  14* 12* 7* 11* 15* 17*   Glucose mg/dL 207* 82  --  164* 164* 319* 314* 247* 226*   Glucose, UA   --   --  Negative  --   --   --   --   --   --    BUN, Bld mg/dL 26* 33*  --  41* 41* 39* 35* 29* 27*   Creatinine mg/dL 1.7* 2.3*  --  2.8* 2.7* 2.9* 2.8* 2.5* 2.4*   Creatinine, Random Ur mg/dL  --   --  90.3  90.3  --   --   --   --   --   --    Albumin g/dL 3.8 3.7  --   --   --   --  2.9* 3.1* 2.8*   Total Bilirubin mg/dL 1.6* 2.6*  --   --   --   --   --  3.0*  --    Alkaline Phosphatase U/L 106 128  --   --   --   --   --  173*  --    AST U/L 53* 1,052*  --   --   --   --   --  12,294*  --    ALT U/L 42 689*  --   --   --   --   --  4,994*  --    Magnesium mg/dL 1.8  --   --   --   --   --  1.8  --  2.3   Phosphorus mg/dL  --   --   --   --   --   --  6.3*  --  3.2       Vancomycin Administrations:  vancomycin given in the last 96 hours                     vancomycin 1.5 g in dextrose 5 % 250 mL IVPB (ready to mix) (g) 1.5 g New Bag 05/01/20 1238                      Microbiologic Results:  Microbiology Results (last 7 days)       Procedure Component Value Units Date/Time    Blood culture [[id number]] Collected:  05/01/20 1222    Order Status:  Completed Specimen:  Blood Updated:  05/02/20 0315     Blood Culture, Routine No Growth to date    Blood culture [[id number]] Collected:  05/01/20 1222    Order Status:  Completed Specimen:  Blood Updated:  05/02/20 0315     Blood Culture, Routine No Growth to date    Blood culture [[id number]]     Order Status:  Canceled Specimen:  Blood     Blood culture  [[id number]]     Order Status:  Canceled Specimen:  Blood

## 2020-05-02 NOTE — SUBJECTIVE & OBJECTIVE
Interval History: Started on CRRT overnight. Remains on pressor support.    Review of Systems   Unable to perform ROS: Intubated     Objective:     Vital Signs (Most Recent):  Temp: 97.2 °F (36.2 °C) (05/02/20 0905)  Pulse: (!) 54 (05/02/20 0905)  Resp: (!) 24 (05/02/20 0905)  BP: (!) 146/67 (05/02/20 0905)  SpO2: 100 % (05/02/20 0905) Vital Signs (24h Range):  Temp:  [93.6 °F (34.2 °C)-99.3 °F (37.4 °C)] 97.2 °F (36.2 °C)  Pulse:  [42-66] 54  Resp:  [16-45] 24  SpO2:  [61 %-100 %] 100 %  BP: ()/(25-93) 146/67  Arterial Line BP: ()/(50-74) 94/54     Weight: 61.8 kg (136 lb 3.9 oz)  Body mass index is 24.13 kg/m².    Intake/Output Summary (Last 24 hours) at 5/2/2020 1006  Last data filed at 5/2/2020 0903  Gross per 24 hour   Intake 5990.31 ml   Output 3398 ml   Net 2592.31 ml      Physical Exam   Constitutional: She appears well-developed.   HENT:   Head: Normocephalic and atraumatic.   Eyes: Conjunctivae are normal. Right eye exhibits no discharge. Left eye exhibits no discharge.   Pupils reactive to light. Appears to have postoperative changes to R?   Cardiovascular: Normal rate.   Peripheral pulses dopplerable.   Pulmonary/Chest: No respiratory distress.   Intubated. Coarse. Mechanical breath sounds.   Abdominal: Soft. There is no tenderness.   Musculoskeletal: She exhibits no edema.   Neurological:   RASS -3, CAM/ICU N/A.   Skin: Skin is warm and dry.   Nursing note and vitals reviewed.    Significant Labs:   CBC:  Recent Labs   Lab 04/30/20  1242 05/01/20  0318 05/02/20  0403   WBC 8.69 16.95* 46.89*   HGB 11.5* 12.7 11.7*   HCT 36.2* 41.4 35.6*    131* 112*   GRAN 76.4*  6.6 83.1*  14.1* 87.5*  41.1*   LYMPH 12.4*  1.1 5.1*  0.9* 3.3*  1.5   MONO 10.0  0.9 10.9  1.9* 6.1  2.9*   EOS 0.0 0.0 0.1   BASO 0.02 0.02 0.13      BMP:  Recent Labs   Lab 04/30/20  1242  05/01/20  2320 05/02/20  0403 05/02/20  0547      < > 133* 133* 133*   K 4.9   < > 4.6 4.4 4.3      < > 94* 95 96    CO2 19*   < > 11* 15* 17*   BUN 26*   < > 35* 29* 27*   CREATININE 1.7*   < > 2.8* 2.5* 2.4*   *   < > 314* 247* 226*   CALCIUM 9.9   < > 8.5* 8.4* 8.1*   MG 1.8  --  1.8  --  2.3   PHOS  --   --  6.3*  --  3.2    < > = values in this interval not displayed.     Significant Imaging:   CXR 05/01/20:  There has been interval placement of an ET tube but the tip lies at the tara pointing towards the right mainstem bronchus and should be retracted at least 3 cm.  There has been interval placement of a nasogastric tube with the tip extending into the left upper quadrant.  A right IJ dialysis catheter or central venous catheter is present without evidence of pneumothorax, tip overlies the SVC. There is external artifact from multiple defibrillator pads, leads, and wires. Heart is enlarged.  There is bibasilar consolidation from pleural fluid and atelectasis and perihilar interstitial and alveolar pulmonary edema are present, similar to prior.    US Retroperitoneal 05/01/20:  Bilateral elevated renal indices. Right perinephric fluid. Possible left pleural effusion. Ascites.

## 2020-05-02 NOTE — PLAN OF CARE
Relevant notes and orders reviewed.  Ventilator in use with settings as ordered.   Alarms functioning.  Ambu bag and mask at bedside.  ET tube in place and secure.  Most recent CXR reviewed.  Most recent ABG reviewed.  Nebulizer therapy as ordered.  Suctioning airway as needed.  Oral care q4.  Spo2 monitoring.  Not weaning vent at this time.    2005  ABG completed and reported to Dr Moreno. New order to decrease RR on vent from 30 to 25. Repeat ABG 2 hours after CRRT has started.    2320  ABG completed and reported to Dr Moreno. No new orders at this time.

## 2020-05-02 NOTE — PROGRESS NOTES
Patient seen and examined by me. I agree with the trainee's separate note except as modified.     In the afternoon yesterday patient deteriorated. Had hypoxemia, not improved with BIPAP. Also was hypotensive. Had possible loss of pulses and was coded briefly. Patient was intubated. Started on CRRT.    I/O 5000/2600 (overall +3.3L). Low temp overnight, now normothermic. HR 60s, MAP 70 on levo and vaso. 100% on 40% and 5 PEEP.    WBC up to 47, K 4.4, bicarb 15, Dbil 3.0, ALT 4994. Lactate >12 persistently. Vanco 17. U/A negative for infectious signs. Blood cx NGTD.    Renal U/S: right perinephric fluid, ascites    CXR reviewed by me: ETT at tara, R I-J in place. Persistent unchanged bilateral opacities.    On my exam: wakes up and follows some commands. Abdomen soft.     Impression: severe septic shock uncertain source. Has ascites, bilateral pleural effusions (both of which seem volume related), and perinephric stranding (although U/A was not suggestive of infection)    Recommendations:   -check CT chest and A/P with contrast. With unknown source of infection, it is critical that she get contrast   -cont vanc, cefepime, and azithromycin  -cont steroids for septic shock  -H2 blocker, not on heparin due to high Xa level and recent Eliquis use  -repeat lactate  -decreased tidal volume due to alkalemia  -wean down pressors     Critical care time (40min) spent personally by me on the following activities: development of treatment plan with patient or surrogate and bedside caregivers, discussions with consultants, evaluation of patient's response to treatment, examination of patient, ordering and performing treatments and interventions, ordering and review of laboratory studies, ordering and review of radiographic studies, pulse oximetry, re-evaluation of patient's condition. This critical care time did not overlap with that of any other provider or involve time for any procedures.

## 2020-05-02 NOTE — PROGRESS NOTES
[facility] Hospital Medicine  Progress Note    Patient Name: Sandra Oseguera  MRN: [id number]  Patient Class: IP- Inpatient   Admission Date: 4/30/2020  Length of Stay: 2 days  Attending Physician: MOSES Guerra MD  Primary Care Provider: America Potts MD        Subjective:     Principal Problem:Shock, unspecified    HPI:  Ms. Oseguera is a 80/F with PMH A-fib (s/p DCCV 09/2019, on apixaban), HFpEF (2D Echo 09/2019 EF 60%), HTN, DMII, RA who presented to ED 04/30 with a three day history of worsening SOB. She reports she has dyspnea on exertion at baseline but feels that it has worsened over the several days prior to admission. Denies orthopnea. Reports associated palpitations in addition, and some mild dry cough. She has had some abdominal cramping and loose stool and poor PO intake over the past several days. She denies fever, chills, myalgias or sore throat. She did not take her medications the morning of presentation, but has been taking them consistently beforehand. Upon presentation to ED she was found to have atrial fibrillation with RVR with rate to the 130s-140s. She received metoprolol succinate 200mg PO and metoprolol 5mg IV x 3 without significant response. Lab evaluation was notable for DUNIA, elevated anion gap, and elevated BNP. CXR demonstrated some pulmonary edema. Hospital medicine was contacted for admission.    Overview/Hospital Course:  No notes on file    Interval History: Started on CRRT overnight. Remains on pressor support.    Review of Systems   Unable to perform ROS: Intubated     Objective:     Vital Signs (Most Recent):  Temp: 97.2 °F (36.2 °C) (05/02/20 0905)  Pulse: (!) 54 (05/02/20 0905)  Resp: (!) 24 (05/02/20 0905)  BP: (!) 146/67 (05/02/20 0905)  SpO2: 100 % (05/02/20 0905) Vital Signs (24h Range):  Temp:  [93.6 °F (34.2 °C)-99.3 °F (37.4 °C)] 97.2 °F (36.2 °C)  Pulse:  [42-66] 54  Resp:  [16-45] 24  SpO2:  [61 %-100 %] 100 %  BP: ()/(25-93)  146/67  Arterial Line BP: ()/(50-74) 94/54     Weight: 61.8 kg (136 lb 3.9 oz)  Body mass index is 24.13 kg/m².    Intake/Output Summary (Last 24 hours) at 5/2/2020 1006  Last data filed at 5/2/2020 0903  Gross per 24 hour   Intake 5990.31 ml   Output 3398 ml   Net 2592.31 ml      Physical Exam   Constitutional: She appears well-developed.   HENT:   Head: Normocephalic and atraumatic.   Eyes: Conjunctivae are normal. Right eye exhibits no discharge. Left eye exhibits no discharge.   Pupils reactive to light. Appears to have postoperative changes to R?   Cardiovascular: Normal rate.   Peripheral pulses dopplerable.   Pulmonary/Chest: No respiratory distress.   Intubated. Coarse. Mechanical breath sounds.   Abdominal: Soft. There is no tenderness.   Musculoskeletal: She exhibits no edema.   Neurological:   RASS -3, CAM/ICU N/A.   Skin: Skin is warm and dry.   Nursing note and vitals reviewed.    Significant Labs:   CBC:  Recent Labs   Lab 04/30/20  1242 05/01/20  0318 05/02/20  0403   WBC 8.69 16.95* 46.89*   HGB 11.5* 12.7 11.7*   HCT 36.2* 41.4 35.6*    131* 112*   GRAN 76.4*  6.6 83.1*  14.1* 87.5*  41.1*   LYMPH 12.4*  1.1 5.1*  0.9* 3.3*  1.5   MONO 10.0  0.9 10.9  1.9* 6.1  2.9*   EOS 0.0 0.0 0.1   BASO 0.02 0.02 0.13      BMP:  Recent Labs   Lab 04/30/20  1242  05/01/20  2320 05/02/20  0403 05/02/20  0547      < > 133* 133* 133*   K 4.9   < > 4.6 4.4 4.3      < > 94* 95 96   CO2 19*   < > 11* 15* 17*   BUN 26*   < > 35* 29* 27*   CREATININE 1.7*   < > 2.8* 2.5* 2.4*   *   < > 314* 247* 226*   CALCIUM 9.9   < > 8.5* 8.4* 8.1*   MG 1.8  --  1.8  --  2.3   PHOS  --   --  6.3*  --  3.2    < > = values in this interval not displayed.     Significant Imaging:   CXR 05/01/20:  There has been interval placement of an ET tube but the tip lies at the tara pointing towards the right mainstem bronchus and should be retracted at least 3 cm.  There has been interval placement of a  nasogastric tube with the tip extending into the left upper quadrant.  A right IJ dialysis catheter or central venous catheter is present without evidence of pneumothorax, tip overlies the SVC. There is external artifact from multiple defibrillator pads, leads, and wires. Heart is enlarged.  There is bibasilar consolidation from pleural fluid and atelectasis and perihilar interstitial and alveolar pulmonary edema are present, similar to prior.    US Retroperitoneal 05/01/20:  Bilateral elevated renal indices. Right perinephric fluid. Possible left pleural effusion. Ascites.      Assessment/Plan:      * Shock, unspecified  - Shock initially believed to be cardiogenic but no significant improvement after rhythm converted to sinus 05/01 with amiodarone. Suspect septic, though not febrile on presentation and no significant symptoms, though on methotrexate outpatient. Unclear etiology. Placed on empiric antibiotic therapy 05/01.  - Decompensation afternoon 05/01 with unresponsive episode and lose of pulse with bradycardia. ROSC with 1 cycle CPR and epinephrine x1. Intubated, A-line and central line placed. Started on pressor support with norepinephrine and vasopressin gtts.  - CT Chest/Abd/Pelvis today to further evaluate for potential source. CXR more consistent with pulmonary edema 2/2 CHF exacerbation on presentation, UA unremarkable for infection but U/S retroperitoneal with some perinephric stranding.  - Lactate persistently >12. Trend.  - Continuing broad spectrum empiric antibiotic coverage with azithromycin 500mg IV q24hr, cefepime 2g IV q8hr, vancomycin IV with pharmacy dosing consult.  - Appreciate critical care assistance.    Metabolic encephalopathy  - In setting of shock.    Acute respiratory failure with hypoxemia  - Worsening respiratory failure requiring intubation 05/01.  - Appreciate pulmonology assistance with vent management.    Pneumonia of right lower lobe due to infectious organism  - Potential;  treating empirically as under shock.    Shock liver  - As under shock.    DUNIA (acute kidney injury)  - DUNIA with oliguria in setting of shock.  - Concurrent acidosis. Started on CRRT 05/01.  - Appreciate nephrology assistance.    Chronic anticoagulation  - As under A-fib RVR.    Atrial fibrillation with rapid ventricular response  - A-Fib RVR with rate to 120s-140s on presentation. Started on amiodarone gtt 04/30.  - Apixaban 5mg PO BID held with worsened shock.  - Converted to NSR 05/01 in AM.  - With significant bradycardia, amiodarone and metoprolol held.  - Appreciate cardiology assistance.    Rheumatoid arthritis  - Home methotrexate held.    Hypercholesterolemia  - Atorvastatin 20mg PO daily held in setting of shock liver.    Acute on chronic diastolic heart failure  - Acute on chronic diastolic heart failure suspected 2/2 A-fib RVR.  - Initial CXR with pulmonary edema, BNP elevated. Some pleural effusion noted as well.  - 2D Echo 05/01 demonstrated EF 55%, no RV strain noted.  - On CRRT as under DUNIA.  - Strict intake/output, daily weights.    CKD (chronic kidney disease) stage 3, GFR 30-59 ml/min  - As under DUNIA.    Type 2 diabetes mellitus, with long-term current use of insulin  - On insulin detemir 15U subQ daily, linagliptin 5mg PO daily at home.  - NPO, but receiving D5 as base for some continuous gtts.  - Continue insulin detemir at 10U subQ qHS, moderate-dose sliding scale insulin aspart 1-10U subQ q6hr PRN.  - Minimize D5 base as feasible for IV infusions.    Hypertension  - Hold benazepril 20mg PO daily, metoprolol 200mg PO daily in setting of shock.    Restrictive lung disease  - On umeclidinium/vilanterol, albuterol at home.    VTE Risk Mitigation (From admission, onward)         Ordered     heparin (porcine) injection 5,000 Units  Use PRN      05/01/20 1707     IP VTE HIGH RISK PATIENT  Once      04/30/20 1831     Place sequential compression device  Until discontinued      04/30/20 1831               Critical due to shock requiring pressor support, ventilatory support.    Critical care time spent on the evaluation and treatment of severe organ dysfunction, review of pertinent labs and imaging studies, discussions with consulting providers and discussions with patient/family: 35 minutes.    RACHEL Guerra MD  Department of Hospital Medicine   [facility]

## 2020-05-02 NOTE — PROGRESS NOTES
Cardiology  Progress Notes            Subjective:  Sedated, on a ventilator.  On low-dose vasopressin.    Sandra Oseguera is a 80 y.o.female with hypertension, hypercholesterolemia and diabetes mellitus type 2. She has a healthy weight. She has rheumatoid arthritis involving knees, hands and back. In the summer of 2013 she developed progressive exertional dyspnea occasionally associated with mild chest pressure. She had an Echocardiogram that revealed findings consistent with hypertensive heart disease with a high LVEDP. She had low exercise tolerance on a Stress MPI but no defects. She was began on furosemide and she was breathing easier. There was no wheezing. She had no exertional chest pain but mild to moderate exertional dyspnea if walking fast. Her heart rate was 70-80 bpm at rest and no longer raced with activities. As it appeared her exertional dyspnea was out of proportion to her cardiac findings she was referrred for PFTs that were done on 1/13/2017. They revealed small airway obstruction without significant response to inhalers. She saw Dr. Jarek Wade and was prescribed Anoro inhalers and a rescue inhaler. She also got enrolled in pulmonary rehabilitation and did 25 sessions. She was breathing better and thought her legs held her back more than her breathing. In late 8/2019 she noted palpitations and then slowly got more and more short of breath. She was seen by Dr. Mauricio Sood on 9/11/2019 and was noted to be in atrial fibrillation with moderately fast VRR. She was admitted, diuresed and anticoagulated. The VRR was controlled with metoprolol. On 9/12/2019 she had an Echo that revealed normal left ventricular size with mild systolic dysfunction. The EF was 45%. There was moderate LVH and the LA was moderately dilated. There was moderate MR and severe TR with the SPAP being elevated to 67 mmHg. On 9/13/2019 she underwent a CRISTELA guided CV. The DONATO had mild spontaneous echocardiographic contrast  and there was moderate MR. She was converted to sinus with a 100 J shock. She was feeling better in sinus rhythm. She has not had any clinical recurrence until recently. On about 4/27/2020 she noted that she was short of breath with minimal activities. Her dyspnea got worse and she noted that her heart was racing with minimal activities. She was advised to go to the ER and was in atrial fibrillation with fast VRR and in heart failure. She was admitted.      Hospital Course:     4/30/2020: Began amiodarone.     5/1/2020: Converted to sinus rhythm.  Amiodarone on hold due to sinus bradycardia.     Vital Signs:  Vitals:    05/02/20 0633 05/02/20 0742 05/02/20 0753 05/02/20 0905   BP:    (!) 146/67   Pulse: 62 (!) 54 (!) 56 (!) 54   Resp: (!) 25 (!) 25 (!) 24 (!) 24   Temp: 99.3 °F (37.4 °C) 98.6 °F (37 °C) 98.6 °F (37 °C) 97.2 °F (36.2 °C)   TempSrc: Rectal      SpO2: 100% 100% 100% 100%   Weight:       Height:           Physical Exam:  Chest clear  Heart regular.  No murmur or gallop.    Laboratory:  CBC:   Recent Labs   Lab 05/02/20  0403   WBC 46.89*   RBC 3.65*   HGB 11.7*   HCT 35.6*   *   MCV 98   MCH 32.1*   MCHC 32.9     BMP:   Recent Labs   Lab 05/02/20  0547   *   *   K 4.3   CL 96   CO2 17*   BUN 27*   CREATININE 2.4*   CALCIUM 8.1*   MG 2.3     CMP:   Recent Labs   Lab 05/02/20  0403 05/02/20  0547   * 226*   CALCIUM 8.4* 8.1*   ALBUMIN 3.1* 2.8*   PROT 6.5  --    * 133*   K 4.4 4.3   CO2 15* 17*   CL 95 96   BUN 29* 27*   CREATININE 2.5* 2.4*   ALKPHOS 173*  --    ALT 4,994*  --    AST 12,294*  --    BILITOT 3.0*  --      LFTs:   Recent Labs   Lab 05/02/20  0403 05/02/20  0547   ALT 4,994*  --    AST 12,294*  --    ALKPHOS 173*  --    BILITOT 3.0*  --    PROT 6.5  --    ALBUMIN 3.1* 2.8*     Coagulation: No results for input(s): PT, INR, APTT in the last 168 hours.  Cardiac markers:   Recent Labs   Lab 05/01/20  1223   TROPONINI 0.289*       Imaging:  US Retroperitoneal  Complete (Kidney and   Final Result      Bilateral elevated renal indices.      Right perinephric fluid.      Possible left pleural effusion.      Ascites.         Electronically signed by: Nori Vazquez   Date:    05/01/2020   Time:    18:43      X-Ray Chest 1 View   Final Result   Abnormal      Low position of endotracheal tube.  Findings were called to the patient's nurse, No, in ICU at 16:28      Otherwise satisfactory life support devices      Stable findings of CHF and pulmonary edema      This report was flagged in Epic as abnormal.         Electronically signed by: Leroy Romero Jr   Date:    05/01/2020   Time:    16:29      X-Ray Chest AP Portable   Final Result      Findings suggestive of CHF and pulmonary edema.         Electronically signed by: Leroy Romero Jr   Date:    04/30/2020   Time:    13:58      CT Chest Abdomen Pelvis With Contrast    (Results Pending)         Problems:   1. Septic shock due to uncertain source  2. Paroxysmal atrial fibrillation  3. Of acute hypoxemic respiratory failure  4. Acute renal failure  5. Rheumatoid arthritis  6. Hypertensive heart disease  7. Diabetes mellitus  8. Restrictive lung disease        Plan of Care:  Hold amiodarone for now.        Kojo Coombs

## 2020-05-02 NOTE — PROGRESS NOTES
Progress Note  Nephrology    Admit Date: 4/30/2020   LOS: 2 days     SUBJECTIVE:     Follow-up For: DUNIA, hyperkalemia    Interval History: Pt. remains intubated, sedated, on 2 pressors, with refractory acidosis, on bicarb gtt@ 150ml/hr.  She remains anuric.  Became hypoxic and bradycardic briefly while at bedside, but resolved without intervention. Seen on CRRT.    OBJECTIVE:     Vital Signs (Most Recent)  Temp: 97.2 °F (36.2 °C) (05/02/20 0905)  Pulse: (!) 56 (05/02/20 1156)  Resp: (!) 25 (05/02/20 1156)  BP: (!) 146/67 (05/02/20 0905)  SpO2: 96 % (05/02/20 1156)    Vital Signs Range (Last 24H):  Temp:  [93.6 °F (34.2 °C)-99.3 °F (37.4 °C)]   Pulse:  [42-66]   Resp:  [16-45]   BP: ()/(25-93)   SpO2:  [61 %-100 %]   Arterial Line BP: ()/(50-74)     Review of Systems: Unable to obtain, as pt intubated and sedated.    Physical Exam:  Gen: frail, intubated, sedated, hypothermic on Ray hugger  HEENT: sclera anicteric, ETT in place  CV: RRR, no m/r  Resp: coarse breath   GI: soft, hypoactive bowel sounds  Skin: warm, dry  Extr: no edema  Access: R IJ trialysis    Laboratory:  Recent Labs   Lab 05/01/20  2320 05/02/20  0403 05/02/20  0547   * 133* 133*   K 4.6 4.4 4.3   CL 94* 95 96   CO2 11* 15* 17*   BUN 35* 29* 27*   CREATININE 2.8* 2.5* 2.4*   CALCIUM 8.5* 8.4* 8.1*   PHOS 6.3*  --  3.2     Recent Labs   Lab 04/30/20  1242 05/01/20  0318 05/02/20  0403   WBC 8.69 16.95* 46.89*   HGB 11.5* 12.7 11.7*   HCT 36.2* 41.4 35.6*    131* 112*        Diagnostic Results:  Labs: Reviewed  X-Ray: Reviewed  Echo: Reviewed    ASSESSMENT/PLAN:     Anuric DUNIA on CKD Stage 3 with shock (?septic) after PEA arrest  - known to me from nephrology clinic.  -Recent baseline Cr~ 1.2-1.4.  - Discussed initiation of CRRT with daughter Janine and granddaughter Jerilyn and phone consent obtained.  Started CVVH without volume removal along with NaHCO3 drip.  - On Broad spectrum abx with Azithro, Cefepime,  VAnco.  -Blood Cx NGTD, no urine avail for cx, re-order sputum cx.  - CXR with b/l pleural effusions.    Renal U/S with R perinephric fluid.    -CT abdomen/pelvis/ chest again with effusion/ atelectasis/ consolidation vs compressive changes in lungs, left nonobstructive nephrolithiasis with bilateral perinephric fat stranding, left greater than right.  There is some indistinctness about the proximal left ureter  persistent lactic acidosis.    -Would discuss kidney findings with urology.  -Sent repeat CRP, Xa, D-Dimer, LFTs, lipase, CK    Afib with RVR, acute on chronic diastolic heart failure  - s/p cardioversion by Dr. Calderon in 9/2019.  - Was on Amio now stopped due to bradycardia.    Lactic acidosis  - due to presumed septic shock and PEA arrest, but due to ?  - On bicarb gtt which can now be stopped  -Continue CRRT.    Shock liver  - from ischemic hepatitis    RA  - on minimal doses of MTX at home.  - Followed by Dr. Figueroa.    Case discussed at bedside with Dr. Guerra    Total critical care time (exclusive of procedural time) : 60 minutes  Critical care was necessary to treat or prevent imminent or life-threatening deterioration of the following conditions: acute hypoxic respiratory failure, PEA arrest, acute renal failure.    Critical care was time spent personally by me on the following activities: development of treatment plan with patient or surrogate, discussions with consultants, interpretation of cardiac output measurements, examination of patient, ordering and performing treatments and interventions, evaluation of patient's response to treatment, obtaining history from patient or surrogate, ordering and review of laboratory studies, ordering and review of radiographic studies, re-evaluation of patient's condition, pulse oximetry and blood draw for specimens    Thank you for allowing me to participate in the care of this patient.      Rica Bee MD  Nephrology

## 2020-05-02 NOTE — ASSESSMENT & PLAN NOTE
Afib with RVR was likely due to sepsis. Previously loaded with IV amiodarone, which caused chemical cardioversion. Oral amio load discontinued due to bradycardic arrest on 5/1.  -con't to monitor on telemetry

## 2020-05-02 NOTE — ASSESSMENT & PLAN NOTE
- Acute on chronic diastolic heart failure suspected 2/2 A-fib RVR.  - Initial CXR with pulmonary edema, BNP elevated. Some pleural effusion noted as well.  - 2D Echo 05/01 demonstrated EF 55%, no RV strain noted.  - On CRRT as under DUNIA.  - Strict intake/output, daily weights.

## 2020-05-02 NOTE — ASSESSMENT & PLAN NOTE
Due to sepsis. Likely the cause of her repetitive bradycardic episodes.   -external warming to maintain euthermia  -stress dose steroids

## 2020-05-02 NOTE — ASSESSMENT & PLAN NOTE
INR >10, low fibrinogen, elevated D-dimer, decreasing platelets & Hb. No evidence of external bleeding.  -FFP & vitamin K  -con't to monitor INR, CBC, fibrinogen Q8h

## 2020-05-02 NOTE — ASSESSMENT & PLAN NOTE
- Worsening respiratory failure requiring intubation 05/01.  - Appreciate pulmonology assistance with vent management.

## 2020-05-02 NOTE — PLAN OF CARE
Patient has a dx of A-Fib with RVR. Withdraws to pain, intubated with 7.5 cm ETT, weaned FiO2 from 70 down to 40% during this shift, Peep 5, on Assist Control. NPO with q6h accuchecks performed; scheduled Lantus and prn SSI given. CRRT with 3 K bath continues; patient remains anuric. Bilateral wrist restraints remain in place. Levophed titrated up to 1.2 mcg/kg/min when CRRT was initiated; turned down to 0.5 mcg/kg/min this morning.

## 2020-05-02 NOTE — PROGRESS NOTES
Progress Note  Nephrology    Admit Date: 4/30/2020   LOS: 1 day     SUBJECTIVE:     Follow-up For: DUNIA, hyperkalemia    Interval History: Called by Pulm/CC fellow this evening after pt went into afib with RVR, then PEA arrest.  CODE BLUE called and ROSC after 1 round of CPR.  She is now intubated, sedated, on 2 pressors, with refractory acidosis, despite bicarb gtt@ 150ml/hr.  She is anuric.  ABG with severe metabolic acidosis.    OBJECTIVE:     Vital Signs (Most Recent)  Temp: (!) 94.3 °F (34.6 °C) (05/01/20 1922)  Pulse: (!) 54 (05/01/20 1922)  Resp: (!) 30 (05/01/20 1922)  BP: (!) 171/79 (05/01/20 1922)  SpO2: 100 % (05/01/20 1928)    Vital Signs Range (Last 24H):  Temp:  [93.6 °F (34.2 °C)-97.8 °F (36.6 °C)]   Pulse:  []   Resp:  [16-56]   BP: ()/(40-93)   SpO2:  [61 %-100 %]   Arterial Line BP: ()/(50-74)     Review of Systems: Unable to obtain, as pt intubated and sedated.    Physical Exam:  Gen: frail, intubated, sedated, hypothermic  HEENT: sclera anicteric, ETT in place  CV: RRR, no m/r  Resp: coarse breath   GI: soft, distended, hypoactive bowel sounds  Skin: warm, dry  Extr: no edema  Access: R IJ trialysis    Laboratory:  Recent Labs   Lab 05/01/20  1222 05/01/20  1223 05/01/20  1625    140 134*   K 5.5* 5.6* 5.8*   CL 99 100 95   CO2 14* 12* 7*   BUN 41* 41* 39*   CREATININE 2.8* 2.7* 2.9*   CALCIUM 9.9 10.0 9.7     Recent Labs   Lab 04/30/20  1242 05/01/20  0318   WBC 8.69 16.95*   HGB 11.5* 12.7   HCT 36.2* 41.4    131*        Diagnostic Results:  Labs: Reviewed  X-Ray: Reviewed  Echo: Reviewed    ASSESSMENT/PLAN:     Anuric DUNIA on CKD Stage 3 with shock (?septic) after PEA arrest  - known to me from nephrology clinic.  -Recent baseline Cr~ 1.2-1.4.  - Discussed initiation of CRRT with daughter Janine and granddaughter Jerilyn and phone consent obtained.  Start CVVH without volume removal with intent that as acidosis clears, pressor requirements will reduce, and then  can begin removing volume.  - Broad spectrum abx started after cultures drawn.  - CXR with b/l pleural effusions.  Renal U/S with R perinephric fluid.  Continue to eval for source of presumed sepsis and lactic acidosis.  Consider CT abdomen.    Afib with RVR, acute on chronic diastolic heart failure  - s/p cardioversion by Dr. Calderon in 9/2019.  - now on Amio.  - No response to diuretics today.    Lactic acidosis  - due to presumed septic shock and PEA arrest.  - On bicarb gtt which will be continued for a few hours after CRRT initiated.    Shock liver  - from ischemic hepatitis    RA  - on minimal doses of MTX at home.  - Followed by Dr. Figueroa.    Total critical care time (exclusive of procedural time) : 35 minutes  Critical care was necessary to treat or prevent imminent or life-threatening deterioration of the following conditions: acute hypoxic respiratory failure, PEA arrest, acute renal failure.    Critical care was time spent personally by me on the following activities: development of treatment plan with patient or surrogate, discussions with consultants, interpretation of cardiac output measurements, examination of patient, ordering and performing treatments and interventions, evaluation of patient's response to treatment, obtaining history from patient or surrogate, ordering and review of laboratory studies, ordering and review of radiographic studies, re-evaluation of patient's condition, pulse oximetry and blood draw for specimens    Thank you for allowing me to participate in the care of this patient.      Carmelo Magana MD  Nephrology

## 2020-05-02 NOTE — SUBJECTIVE & OBJECTIVE
Interval History: Bradycardic PEA arrest yesterday with ROSC after 2 min cycle of chest compressions. Strong  and aggressively fighting after ROSC, but not following commands or tracking. Stable overnight on CRRT & MV support. This morning she is awake on minimal sedation. This afternoon she developed recurrent bradycardia with hypothermia.    Objective:     Vital Signs (Most Recent):  Temp: 97.2 °F (36.2 °C) (05/02/20 0905)  Pulse: (!) 54 (05/02/20 0905)  Resp: (!) 24 (05/02/20 0905)  BP: (!) 146/67 (05/02/20 0905)  SpO2: 100 % (05/02/20 0905) Vital Signs (24h Range):  Temp:  [93.6 °F (34.2 °C)-99.3 °F (37.4 °C)] 97.2 °F (36.2 °C)  Pulse:  [42-66] 54  Resp:  [16-45] 24  SpO2:  [61 %-100 %] 100 %  BP: ()/(25-93) 146/67  Arterial Line BP: ()/(50-74) 94/54     Weight: 61.8 kg (136 lb 3.9 oz)  Body mass index is 24.13 kg/m².      Intake/Output Summary (Last 24 hours) at 5/2/2020 0931  Last data filed at 5/2/2020 0903  Gross per 24 hour   Intake 6042.92 ml   Output 3398 ml   Net 2644.92 ml       Physical Exam   Constitutional:   Frail-appearing elderly woman laying in bed in NAD.   HENT:   Head: Normocephalic and atraumatic.   Eyes: No scleral icterus.   Right pupil larger than left, right appears post-surgical   Neck: Neck supple.   RIJ trialysis   Cardiovascular:   Bradycardic in 50s, regular rhythm, no murmurs   Pulmonary/Chest: Effort normal. She has no rales.   Decreased basilar breath sounds   Abdominal: Soft. She exhibits distension. There is no tenderness.   Musculoskeletal: She exhibits no edema or deformity.   Neurological:   RASS 0, following commands in all extremities. Tracking, answering y/n questions.   Skin: Skin is warm and dry. No rash noted.       Vents:  Vent Mode: A/C (05/02/20 0905)  Set Rate: 25 BPM (05/02/20 0905)  Vt Set: 320 mL (05/02/20 0905)  PEEP/CPAP: 5 cmH20 (05/02/20 0905)  Oxygen Concentration (%): 30 (05/02/20 0905)  Peak Airway Pressure: 21 cmH2O (05/02/20 0905)  Total  Ve: 7.83 mL (05/02/20 0905)  F/VT Ratio<105 (RSBI): (!) 76.92 (05/02/20 0905)  Plateau 20    Lines/Drains/Airways     Central Venous Catheter Line            Trialysis (Dialysis) Catheter 05/01/20 1515 right internal jugular less than 1 day          Drain                 Urethral Catheter 05/01/20 0040 1 day         NG/OG Tube 05/01/20 1400 [place] sump 14 Fr. Center mouth less than 1 day          Airway                 Airway - Non-Surgical 05/01/20 Endotracheal Tube 1 day          Arterial Line                 Arterial Line 05/01/20 1412 Left Radial less than 1 day          Peripheral Intravenous Line                 Peripheral IV - Single Lumen 04/30/20 1236 20 G Right Antecubital 1 day         Peripheral IV - Single Lumen 04/30/20 1727 20 G Left Forearm 1 day                Significant Labs:    CBC/Anemia Profile:  Recent Labs   Lab 04/30/20  1242 05/01/20  0318 05/02/20  0403   WBC 8.69 16.95* 46.89*   HGB 11.5* 12.7 11.7*   HCT 36.2* 41.4 35.6*    131* 112*   * 104* 98   RDW 16.7* 17.2* 16.3*        Chemistries:  Recent Labs   Lab 04/30/20  1242 05/01/20 0318 05/01/20  2320 05/02/20  0403 05/02/20  0547    141   < > 133* 133* 133*   K 4.9 6.4*   < > 4.6 4.4 4.3    105   < > 94* 95 96   CO2 19* 8*   < > 11* 15* 17*   BUN 26* 33*   < > 35* 29* 27*   CREATININE 1.7* 2.3*   < > 2.8* 2.5* 2.4*   CALCIUM 9.9 10.0   < > 8.5* 8.4* 8.1*   ALBUMIN 3.8 3.7  --  2.9* 3.1* 2.8*   PROT 8.0 7.8  --   --  6.5  --    BILITOT 1.6* 2.6*  --   --  3.0*  --    ALKPHOS 106 128  --   --  173*  --    ALT 42 689*  --   --  4,994*  --    AST 53* 1,052*  --   --  12,294*  --    MG 1.8  --   --  1.8  --  2.3   PHOS  --   --   --  6.3*  --  3.2    < > = values in this interval not displayed.       ABGs:   Recent Labs   Lab 05/02/20  0750   PH 7.552*   PCO2 22.3*   HCO3 19.6*   POCSATURATED 100   BE -3     Blood Culture:   Recent Labs   Lab 05/01/20  1222   LABBLOO No Growth to date  No Growth to date      Coagulation: anti-Xa >1.5  INR >10  Fibrinogen 148      Lactic Acid:   Recent Labs   Lab 05/01/20  1625 05/01/20 2007 05/02/20  0013   LACTATE >12.0* >12.0* >12.0*     All pertinent labs within the past 24 hours have been reviewed.    Significant Imaging:  CXR: I have reviewed all pertinent results/findings within the past 24 hours and my personal findings are:  ETT in R mainstem (subsequently pulled back), RIJ CVC, OGT in place. No obvious opacities, persistent layering effusions.

## 2020-05-02 NOTE — ASSESSMENT & PLAN NOTE
On apixaban for history of Afib. Currently in DIC.  -holding apixaban given coagulopathy and hepatic dysfunction

## 2020-05-02 NOTE — EICU
Notified of ABG result    7.37/20/226/-14/11  AC 30/420/60%/5 PEEP    Patient not breathing over set rate  About to be initiated on CRRT and is continued on bicarb drip  Still on norepinephrine 0.5, vasopressin 0.04  Sedated on Fentanyl 150    · Decrease rate to 25 and reccheck ABG 2-3 hours into CRRT. Discussed with RT.  · FiO2 already decreased to 50%

## 2020-05-02 NOTE — ASSESSMENT & PLAN NOTE
- DUNIA with oliguria in setting of shock.  - Concurrent acidosis. Started on CRRT 05/01.  - Appreciate nephrology assistance.

## 2020-05-02 NOTE — ASSESSMENT & PLAN NOTE
- A-Fib RVR with rate to 120s-140s on presentation. Started on amiodarone gtt 04/30.  - Apixaban 5mg PO BID held with worsened shock.  - Converted to NSR 05/01 in AM.  - With significant bradycardia, amiodarone and metoprolol held.  - Appreciate cardiology assistance.

## 2020-05-02 NOTE — ASSESSMENT & PLAN NOTE
Shock- likely septic, although the source is unknown. Lactic acidosis slowly improving. With her chronic immunosuppression, she may not develop typical infectious symptoms, and no source has yet been identified. Now complicated by severe MOF.  -blood cultures pending  -Empiric antibiotics to cover respiratory pathogens. Broad coverage required with her immunosuppression.  -serial LA until clears  -CT C/A/P today to evaluate for drainable source- nothing identified as a likely source. Would consider thoracentesis if her lactic acidosis fails to improve, but would be contraindicated with her current coagulopathy.  -vasopressors to maintain MAP >65; currently requiring vaso & norepi  -con't stress dose hydrocortisone + fludrocortisone x 7 days

## 2020-05-02 NOTE — PROGRESS NOTES
[facility]  Pulmonology  Progress Note    Patient Name: Sandra Oseguera  MRN: [id number]  Admission Date: 4/30/2020  Hospital Length of Stay: 2 days  Code Status: Full Code  Attending Provider: MOSES Guerra MD  Primary Care Provider: America Potts MD   Principal Problem: Septic shock    Subjective:     Interval History: Bradycardic PEA arrest yesterday with ROSC after 2 min cycle of chest compressions. Strong  and aggressively fighting after ROSC, but not following commands or tracking. Stable overnight on CRRT & MV support. This morning she is awake on minimal sedation. This afternoon she developed recurrent bradycardia with hypothermia.    Objective:     Vital Signs (Most Recent):  Temp: 97.2 °F (36.2 °C) (05/02/20 0905)  Pulse: (!) 54 (05/02/20 0905)  Resp: (!) 24 (05/02/20 0905)  BP: (!) 146/67 (05/02/20 0905)  SpO2: 100 % (05/02/20 0905) Vital Signs (24h Range):  Temp:  [93.6 °F (34.2 °C)-99.3 °F (37.4 °C)] 97.2 °F (36.2 °C)  Pulse:  [42-66] 54  Resp:  [16-45] 24  SpO2:  [61 %-100 %] 100 %  BP: ()/(25-93) 146/67  Arterial Line BP: ()/(50-74) 94/54     Weight: 61.8 kg (136 lb 3.9 oz)  Body mass index is 24.13 kg/m².      Intake/Output Summary (Last 24 hours) at 5/2/2020 0931  Last data filed at 5/2/2020 0903  Gross per 24 hour   Intake 6042.92 ml   Output 3398 ml   Net 2644.92 ml       Physical Exam   Constitutional:   Frail-appearing elderly woman laying in bed in NAD.   HENT:   Head: Normocephalic and atraumatic.   Eyes: No scleral icterus.   Right pupil larger than left, right appears post-surgical   Neck: Neck supple.   RIJ trialysis   Cardiovascular:   Bradycardic in 50s, regular rhythm, no murmurs   Pulmonary/Chest: Effort normal. She has no rales.   Decreased basilar breath sounds   Abdominal: Soft. She exhibits distension. There is no tenderness.   Musculoskeletal: She exhibits no edema or deformity.   Neurological:   RASS 0, following commands in all  extremities. Tracking, answering y/n questions.   Skin: Skin is warm and dry. No rash noted.       Vents:  Vent Mode: A/C (05/02/20 0905)  Set Rate: 25 BPM (05/02/20 0905)  Vt Set: 320 mL (05/02/20 0905)  PEEP/CPAP: 5 cmH20 (05/02/20 0905)  Oxygen Concentration (%): 30 (05/02/20 0905)  Peak Airway Pressure: 21 cmH2O (05/02/20 0905)  Total Ve: 7.83 mL (05/02/20 0905)  F/VT Ratio<105 (RSBI): (!) 76.92 (05/02/20 0905)  Plateau 20    Lines/Drains/Airways     Central Venous Catheter Line            Trialysis (Dialysis) Catheter 05/01/20 1515 right internal jugular less than 1 day          Drain                 Urethral Catheter 05/01/20 0040 1 day         NG/OG Tube 05/01/20 1400 [place] sump 14 Fr. Center mouth less than 1 day          Airway                 Airway - Non-Surgical 05/01/20 Endotracheal Tube 1 day          Arterial Line                 Arterial Line 05/01/20 1412 Left Radial less than 1 day          Peripheral Intravenous Line                 Peripheral IV - Single Lumen 04/30/20 1236 20 G Right Antecubital 1 day         Peripheral IV - Single Lumen 04/30/20 1727 20 G Left Forearm 1 day                Significant Labs:    CBC/Anemia Profile:  Recent Labs   Lab 04/30/20  1242 05/01/20  0318 05/02/20  0403   WBC 8.69 16.95* 46.89*   HGB 11.5* 12.7 11.7*   HCT 36.2* 41.4 35.6*    131* 112*   * 104* 98   RDW 16.7* 17.2* 16.3*        Chemistries:  Recent Labs   Lab 04/30/20  1242 05/01/20  0318  05/01/20  2320 05/02/20  0403 05/02/20  0547    141   < > 133* 133* 133*   K 4.9 6.4*   < > 4.6 4.4 4.3    105   < > 94* 95 96   CO2 19* 8*   < > 11* 15* 17*   BUN 26* 33*   < > 35* 29* 27*   CREATININE 1.7* 2.3*   < > 2.8* 2.5* 2.4*   CALCIUM 9.9 10.0   < > 8.5* 8.4* 8.1*   ALBUMIN 3.8 3.7  --  2.9* 3.1* 2.8*   PROT 8.0 7.8  --   --  6.5  --    BILITOT 1.6* 2.6*  --   --  3.0*  --    ALKPHOS 106 128  --   --  173*  --    ALT 42 689*  --   --  4,994*  --    AST 53* 1,052*  --   --  12,294*  --     MG 1.8  --   --  1.8  --  2.3   PHOS  --   --   --  6.3*  --  3.2    < > = values in this interval not displayed.       ABGs:   Recent Labs   Lab 05/02/20  0750   PH 7.552*   PCO2 22.3*   HCO3 19.6*   POCSATURATED 100   BE -3     Blood Culture:   Recent Labs   Lab 05/01/20  1222   LABBLOO No Growth to date  No Growth to date     Coagulation: anti-Xa >1.5  INR >10  Fibrinogen 148      Lactic Acid:   Recent Labs   Lab 05/01/20  1625 05/01/20 2007 05/02/20  0013   LACTATE >12.0* >12.0* >12.0*     All pertinent labs within the past 24 hours have been reviewed.    Significant Imaging:  CXR: I have reviewed all pertinent results/findings within the past 24 hours and my personal findings are:  ETT in R mainstem (subsequently pulled back), RIJ CVC, OGT in place. No obvious opacities, persistent layering effusions.    Assessment/Plan:     * Septic shock  Shock- likely septic, although the source is unknown. Lactic acidosis slowly improving. With her chronic immunosuppression, she may not develop typical infectious symptoms, and no source has yet been identified. Now complicated by severe MOF.  -blood cultures pending  -Empiric antibiotics to cover respiratory pathogens. Broad coverage required with her immunosuppression.  -serial LA until clears  -CT C/A/P today to evaluate for drainable source- nothing identified as a likely source. Would consider thoracentesis if her lactic acidosis fails to improve, but would be contraindicated with her current coagulopathy.  -vasopressors to maintain MAP >65; currently requiring vaso & norepi  -con't stress dose hydrocortisone + fludrocortisone x 7 days    Hypothermia  Due to sepsis. Likely the cause of her repetitive bradycardic episodes.   -external warming to maintain euthermia  -stress dose steroids      DIC (disseminated intravascular coagulation)  INR >10, low fibrinogen, elevated D-dimer, decreasing platelets & Hb. No evidence of external bleeding.  -FFP & vitamin K  -con't to  monitor INR, CBC, fibrinogen Q8h    Metabolic encephalopathy  2/2 sepsis  -minimize sedation as appropriate  -delirium precautions    Lactic acidosis  Persistent lactic acidosis.   -serial lactate levels    Hyperkalemia  Hyperkalemia likely 2/2 AGMA and DUNIA on CKD- resolved on CRRT    Shock liver  Shock liver seems more likely than congestive hepatopathy  -supportive care  -con't to trend    DUNIA (acute kidney injury)  DUNIA on CKD, likely due to ATN from septic shock. Complicated by hyperkalemia and AGMA- both improved on CRRT.  -agree with d/c bicarb infusion  -appreciate Nephrology's assistance  -renally dose meds & avoid nephrotoxic meds as able  -dosing vanc based on levels per pharmacy    Chronic anticoagulation  On apixaban for history of Afib. Currently in DIC.  -holding apixaban given coagulopathy and hepatic dysfunction    Atrial fibrillation with rapid ventricular response  Afib with RVR was likely due to sepsis. Previously loaded with IV amiodarone, which caused chemical cardioversion. Oral amio load discontinued due to bradycardic arrest on 5/1.  -con't to monitor on telemetry    Rheumatoid arthritis  -holding PTA MTX while acutely ill      Ms. Oseguera's daughter Janine was updated over the phone.     Stephanie De Leon, DO  Pulmonology  [facility]

## 2020-05-02 NOTE — ASSESSMENT & PLAN NOTE
DUNIA on CKD, likely due to ATN from septic shock. Complicated by hyperkalemia and AGMA- both improved on CRRT.  -agree with d/c bicarb infusion  -appreciate Nephrology's assistance  -renally dose meds & avoid nephrotoxic meds as able  -dosing vanc based on levels per pharmacy

## 2020-05-03 NOTE — ASSESSMENT & PLAN NOTE
Due to sepsis. Likely the cause of her repetitive bradycardic episodes. Portends a poor prognosis with it's persistence.  -external warming to maintain euthermia  -stress dose steroids

## 2020-05-03 NOTE — PROGRESS NOTES
[facility]  Pulmonology  Progress Note    Patient Name: Sandra Oseguera  MRN: [id number]  Admission Date: 4/30/2020  Hospital Length of Stay: 3 days  Code Status: Full Code  Attending Provider: MOSES Guerra MD  Primary Care Provider: America Potts MD   Principal Problem: Septic shock    Subjective:     Interval History: Overnight no acute events. Sinus pause yesterday with spontaneous return of circulation before chest compressions could be started, which occurred when her core temp had dropped. Sedation overnight had to be restarted due to agitation.    Objective:     Vital Signs (Most Recent):  Temp: 96.1 °F (35.6 °C) (05/03/20 1100)  Pulse: 64 (05/03/20 1100)  Resp: (!) 23 (05/03/20 1100)  BP: 123/68 (05/03/20 1100)  SpO2: 97 % (05/03/20 1100) Vital Signs (24h Range):  Temp:  [94.6 °F (34.8 °C)-97.5 °F (36.4 °C)] 96.1 °F (35.6 °C)  Pulse:  [48-68] 64  Resp:  [22-26] 23  SpO2:  [93 %-100 %] 97 %  BP: ()/(53-84) 123/68  Arterial Line BP: ()/(40-72) 122/56     Weight: 61.8 kg (136 lb 3.9 oz)  Body mass index is 24.13 kg/m².      Intake/Output Summary (Last 24 hours) at 5/3/2020 1342  Last data filed at 5/3/2020 1101  Gross per 24 hour   Intake 2556.97 ml   Output 2334 ml   Net 222.97 ml   net for admission: +3.6L    Physical Exam   Constitutional:   Frail-appearing elderly woman laying in bed in NAD.    HENT:   Head: Normocephalic and atraumatic.   Eyes: No scleral icterus.   Neck: Neck supple.   Cardiovascular: Normal rate and regular rhythm.   Pulmonary/Chest: She has no wheezes. She has no rales.   Breathing comfortably mostly at the set rate. When aroused she will breathe above. Eyes open, but not tracking or following commands.   Abdominal: Soft. She exhibits no distension.   Musculoskeletal: She exhibits no edema or deformity.   Dependent anasarca   Neurological: She is alert.   Opens eyes to stimulation, but not tracking or following commands.    Skin: Skin is  warm and dry. No rash noted.       Vents:  Vent Mode: A/C (05/03/20 1139)  Set Rate: 22 BPM (05/03/20 1139)  Vt Set: 320 mL (05/03/20 1139)  PEEP/CPAP: 5 cmH20 (05/03/20 1139)  Oxygen Concentration (%): 21 (05/03/20 1139)  Peak Airway Pressure: 10 cmH2O (05/03/20 1139)  Total Ve: 7.98 mL (05/03/20 1139)  F/VT Ratio<105 (RSBI): (!) 59.3 (05/03/20 0949)    Lines/Drains/Airways     Central Venous Catheter Line            Trialysis (Dialysis) Catheter 05/01/20 1515 right internal jugular 1 day          Drain                 Urethral Catheter 05/01/20 0040 2 days         NG/OG Tube 05/01/20 1400 [place] sump 14 Fr. Center mouth 1 day          Airway                 Airway - Non-Surgical 05/01/20 Endotracheal Tube 2 days          Arterial Line                 Arterial Line 05/01/20 1412 Left Radial 1 day          Peripheral Intravenous Line                 Peripheral IV - Single Lumen 04/30/20 1236 20 G Right Antecubital 3 days         Peripheral IV - Single Lumen 04/30/20 1727 20 G Left Forearm 2 days                Significant Labs:    CBC/Anemia Profile:  Recent Labs   Lab 05/02/20  1934 05/03/20  0342 05/03/20  1222   WBC 35.17* 36.82* 31.51*   HGB 9.6* 9.5* 9.1*   HCT 29.5* 29.7* 28.6*   PLT 62* 76* 72*   MCV 99* 100* 102*   RDW 16.8* 17.0* 17.3*        Chemistries:  Recent Labs   Lab 05/02/20  0403  05/02/20  1216 05/02/20  1934 05/03/20  0342 05/03/20  1222   *   < > 132* 134* 134* 136   K 4.4   < > 4.0 4.4 4.4 4.9   CL 95   < > 96 97 99 99   CO2 15*   < > 17* 15* 16* 12*   BUN 29*   < > 23 19 17 15   CREATININE 2.5*   < > 2.1* 2.1* 1.8* 1.7*   CALCIUM 8.4*   < > 7.2* 8.2* 8.4* 8.9   ALBUMIN 3.1*   < > 2.4* 3.3* 3.3*  3.3* 3.7   PROT 6.5  --  5.0*  --  6.4  --    BILITOT 3.0*  --  2.8*  --  3.9*  --    ALKPHOS 173*  --  133  --  174*  --    ALT 4,994*  --  3,921*  --  3,568*  --    AST 12,294*  --  9,429*  --  5,873*  --    MG  --    < > 2.1 2.2 2.2 2.4   PHOS  --    < > 3.5 4.2 4.0 5.1*    < > = values in  this interval not displayed.       ABGs:   Recent Labs   Lab 05/03/20  0732   PH 7.383   PCO2 30.7*   HCO3 18.3*   POCSATURATED 98   BE -7     Blood Culture: NGTD    Coagulation:   Recent Labs   Lab 05/03/20  1222   INR 2.7*     Lactic Acid:   Recent Labs   Lab 05/02/20  1934 05/03/20  0342 05/03/20  1222   LACTATE >12.0* 11.1* >12.0*     POCT Glucose:   Recent Labs   Lab 05/03/20  0627 05/03/20  0731 05/03/20  1218   POCTGLUCOSE 79 81 56*     Respiratory Culture:   Recent Labs   Lab 05/02/20  1400   GSRESP <10 epithelial cells per low power field.  Rare WBC's  No organisms seen       Significant Imaging:  CT: I have reviewed all pertinent results/findings within the past 24 hours and my personal findings are:  bilteral dependent pleural effusions, ascites, peripnephric fat stranding.     Bedside US: Simple-appearing dependent pleural effusions bilaterally, not larger or more complicated than 5/1 exam.     Assessment/Plan:     * Septic shock  Shock- likely septic, although the source is unknown. Lactic acidosis slowly improving. With her chronic immunosuppression, she may not develop typical infectious symptoms, and no source has yet been identified. Now complicated by severe MOF.  -blood & resp cultures pending  -high risk urine culture today  -Empiric broad antibiotic coverage required with her immunosuppression. Stopping azithromycin given low suspicion for respiratory source.  -serial LA until clears  -Unable to perform diagnostic para or thora with coagulopathy. No evidence of loculations or high risk features to suggest a complicated pleural space infection.  -vasopressors to maintain MAP >65; currently requiring vaso & norepi  -con't stress dose hydrocortisone + fludrocortisone x 7 days    Hypothermia  Due to sepsis. Likely the cause of her repetitive bradycardic episodes. Portends a poor prognosis with it's persistence.  -external warming to maintain euthermia  -stress dose steroids      Lactic  acidosis  Persistent lactic acidosis of unclear etiology. No obvious source of sepsis at this time.  -serial lactate levels  -repeat CK today  -EEG to evaluate for NCSE; keep on propofol until this can be performed    Shock liver  Shock liver seems more likely than congestive hepatopathy. Improving.  -supportive care  -con't to trend    DUNIA (acute kidney injury)  Oliguric DUNIA on CKD, likely due to ATN from septic shock. Complicated by hyperkalemia and AGMA- both improved on CRRT.   -appreciate Nephrology's assistance  -renally dose meds & avoid nephrotoxic meds as able  -dosing vanc based on levels per pharmacy    Chronic anticoagulation  On apixaban for history of Afib. Currently in DIC.  -holding apixaban given coagulopathy and hepatic dysfunction    Atrial fibrillation with rapid ventricular response  Afib with RVR was likely due to sepsis. Previously loaded with IV amiodarone, which caused chemical cardioversion. Oral amio load discontinued due to bradycardic arrest on 5/1.  -con't to monitor on telemetry    Rheumatoid arthritis  -holding PTA MTX while acutely ill    Type 2 diabetes mellitus, with long-term current use of insulin  -decreased long-acting insulin by 30%  -monitor for hypoglycemia due to DUNIA and hepatic dysfunction  -goal -180 while admitted  -if persistent hypoglycemia, may need D10W infusion today           Stephanie De Leon,   Pulmonology  [facility]

## 2020-05-03 NOTE — ASSESSMENT & PLAN NOTE
- Worsening respiratory failure requiring intubation 05/01.  - Ventilatory requirements improving.  - Appreciate pulmonology assistance with vent management.

## 2020-05-03 NOTE — ASSESSMENT & PLAN NOTE
- Significant elevation in INR, D-dimer elevated, fibrinogen low on 05/02 consistent with DIC secondary to shock.  - Transfused 2U FFP, received vitamin K 10mg IV on 05/02 with subsequent improvement.  - Monitor.

## 2020-05-03 NOTE — SUBJECTIVE & OBJECTIVE
Interval History: Overnight no acute events. Sinus pause yesterday with spontaneous return of circulation before chest compressions could be started, which occurred when her core temp had dropped. Sedation overnight had to be restarted due to agitation.    Objective:     Vital Signs (Most Recent):  Temp: 96.1 °F (35.6 °C) (05/03/20 1100)  Pulse: 64 (05/03/20 1100)  Resp: (!) 23 (05/03/20 1100)  BP: 123/68 (05/03/20 1100)  SpO2: 97 % (05/03/20 1100) Vital Signs (24h Range):  Temp:  [94.6 °F (34.8 °C)-97.5 °F (36.4 °C)] 96.1 °F (35.6 °C)  Pulse:  [48-68] 64  Resp:  [22-26] 23  SpO2:  [93 %-100 %] 97 %  BP: ()/(53-84) 123/68  Arterial Line BP: ()/(40-72) 122/56     Weight: 61.8 kg (136 lb 3.9 oz)  Body mass index is 24.13 kg/m².      Intake/Output Summary (Last 24 hours) at 5/3/2020 1342  Last data filed at 5/3/2020 1101  Gross per 24 hour   Intake 2556.97 ml   Output 2334 ml   Net 222.97 ml   net for admission: +3.6L    Physical Exam   Constitutional:   Frail-appearing elderly woman laying in bed in NAD.    HENT:   Head: Normocephalic and atraumatic.   Eyes: No scleral icterus.   Neck: Neck supple.   Cardiovascular: Normal rate and regular rhythm.   Pulmonary/Chest: She has no wheezes. She has no rales.   Breathing comfortably mostly at the set rate. When aroused she will breathe above. Eyes open, but not tracking or following commands.   Abdominal: Soft. She exhibits no distension.   Musculoskeletal: She exhibits no edema or deformity.   Dependent anasarca   Neurological: She is alert.   Opens eyes to stimulation, but not tracking or following commands.    Skin: Skin is warm and dry. No rash noted.       Vents:  Vent Mode: A/C (05/03/20 1139)  Set Rate: 22 BPM (05/03/20 1139)  Vt Set: 320 mL (05/03/20 1139)  PEEP/CPAP: 5 cmH20 (05/03/20 1139)  Oxygen Concentration (%): 21 (05/03/20 1139)  Peak Airway Pressure: 10 cmH2O (05/03/20 1139)  Total Ve: 7.98 mL (05/03/20 1139)  F/VT Ratio<105 (RSBI): (!) 59.3  (05/03/20 0949)    Lines/Drains/Airways     Central Venous Catheter Line            Trialysis (Dialysis) Catheter 05/01/20 1515 right internal jugular 1 day          Drain                 Urethral Catheter 05/01/20 0040 2 days         NG/OG Tube 05/01/20 1400 [place] sump 14 Fr. Center mouth 1 day          Airway                 Airway - Non-Surgical 05/01/20 Endotracheal Tube 2 days          Arterial Line                 Arterial Line 05/01/20 1412 Left Radial 1 day          Peripheral Intravenous Line                 Peripheral IV - Single Lumen 04/30/20 1236 20 G Right Antecubital 3 days         Peripheral IV - Single Lumen 04/30/20 1727 20 G Left Forearm 2 days                Significant Labs:    CBC/Anemia Profile:  Recent Labs   Lab 05/02/20  1934 05/03/20  0342 05/03/20  1222   WBC 35.17* 36.82* 31.51*   HGB 9.6* 9.5* 9.1*   HCT 29.5* 29.7* 28.6*   PLT 62* 76* 72*   MCV 99* 100* 102*   RDW 16.8* 17.0* 17.3*        Chemistries:  Recent Labs   Lab 05/02/20  0403  05/02/20  1216 05/02/20  1934 05/03/20  0342 05/03/20  1222   *   < > 132* 134* 134* 136   K 4.4   < > 4.0 4.4 4.4 4.9   CL 95   < > 96 97 99 99   CO2 15*   < > 17* 15* 16* 12*   BUN 29*   < > 23 19 17 15   CREATININE 2.5*   < > 2.1* 2.1* 1.8* 1.7*   CALCIUM 8.4*   < > 7.2* 8.2* 8.4* 8.9   ALBUMIN 3.1*   < > 2.4* 3.3* 3.3*  3.3* 3.7   PROT 6.5  --  5.0*  --  6.4  --    BILITOT 3.0*  --  2.8*  --  3.9*  --    ALKPHOS 173*  --  133  --  174*  --    ALT 4,994*  --  3,921*  --  3,568*  --    AST 12,294*  --  9,429*  --  5,873*  --    MG  --    < > 2.1 2.2 2.2 2.4   PHOS  --    < > 3.5 4.2 4.0 5.1*    < > = values in this interval not displayed.       ABGs:   Recent Labs   Lab 05/03/20  0732   PH 7.383   PCO2 30.7*   HCO3 18.3*   POCSATURATED 98   BE -7     Blood Culture: NGTD    Coagulation:   Recent Labs   Lab 05/03/20  1222   INR 2.7*     Lactic Acid:   Recent Labs   Lab 05/02/20  1934 05/03/20  0342 05/03/20  1222   LACTATE >12.0* 11.1* >12.0*      POCT Glucose:   Recent Labs   Lab 05/03/20  0627 05/03/20  0731 05/03/20  1218   POCTGLUCOSE 79 81 56*     Respiratory Culture:   Recent Labs   Lab 05/02/20  1400   GSRESP <10 epithelial cells per low power field.  Rare WBC's  No organisms seen       Significant Imaging:  CT: I have reviewed all pertinent results/findings within the past 24 hours and my personal findings are:  bilteral dependent pleural effusions, ascites, peripnephric fat stranding.     Bedside US: Simple-appearing dependent pleural effusions bilaterally, not larger or more complicated than 5/1 exam.

## 2020-05-03 NOTE — PROGRESS NOTES
Pharmacokinetic Assessment Follow Up: IV Vancomycin    Vancomycin serum concentration assessment(s):    The random level was drawn correctly and can be used to guide therapy at this time. The measurement is within the desired definitive target range of 10 to 20 mcg/mL.    Vancomycin Regimen Plan:    Re-dose when the random level is less than 20 mcg/mL, next level to be drawn at 0430 on 5/4/20     Drug levels (last 3 results):  Recent Labs   Lab Result Units 05/02/20  0403 05/03/20  0342   Vancomycin, Random ug/mL 17.2 17.2       Pharmacy will continue to follow and monitor vancomycin.    Please contact pharmacy at extension [phone] for questions regarding this assessment.    Thank you for the consult,   Navjot Mariano       Patient brief summary:  Sandra Oseguera is a 80 y.o. female initiated on antimicrobial therapy with IV Vancomycin for treatment of pneumonia     The patient's current regimen is pulse dosing    Drug Allergies:   Review of patient's allergies indicates:   Allergen Reactions    Codeine Nausea And Vomiting     States can take oxycodone and hydrocodone    Sulfa (sulfonamide antibiotics) Nausea And Vomiting       Actual Body Weight:   61.8 kg    Renal Function:   Estimated Creatinine Clearance: 21 mL/min (A) (based on SCr of 1.8 mg/dL (H)).,     Dialysis Method (if applicable):  CRRT    CBC (last 72 hours):  Recent Labs   Lab Result Units 04/30/20  1242 04/30/20  1801 05/01/20  0318 05/02/20  0403 05/02/20  1216 05/02/20  1934 05/03/20  0342   WBC K/uL 8.69  --  16.95* 46.89* 35.07* 35.17* 36.82*   Hemoglobin g/dL 11.5*  --  12.7 11.7* 9.8* 9.6* 9.5*   Hemoglobin A1C %  --  8.3*  --   --   --   --   --    Hematocrit % 36.2*  --  41.4 35.6* 29.4* 29.5* 29.7*   Platelets K/uL 154  --  131* 112* 79* 62* 76*   Gran% % 76.4*  --  83.1* 87.5* 66.0 84.0* 87.0*   Lymph% % 12.4*  --  5.1* 3.3* 2.0* 2.0* 2.0*   Mono% % 10.0  --  10.9 6.1 2.0* 4.0 5.0   Eosinophil% % 0.2  --  0.0 0.2 0.0 0.0 0.0    Basophil% % 0.2  --  0.1 0.3 0.0 0.0 0.0   Differential Method  Automated  --  Automated Automated Manual Manual Manual       Metabolic Panel (last 72 hours):  Recent Labs   Lab Result Units 04/30/20  1242 05/01/20  0318 05/01/20  0730 05/01/20  1222 05/01/20  1223 05/01/20  1625 05/01/20  2320 05/02/20  0403 05/02/20  0547 05/02/20  1216 05/02/20  1934 05/03/20  0342   Sodium mmol/L 141 141  --  139 140 134* 133* 133* 133* 132* 134* 134*   Sodium Urine Random mmol/L  --   --  38  --   --   --   --   --   --   --   --   --    Potassium mmol/L 4.9 6.4*  --  5.5* 5.6* 5.8* 4.6 4.4 4.3 4.0 4.4 4.4   Chloride mmol/L 104 105  --  99 100 95 94* 95 96 96 97 99   CO2 mmol/L 19* 8*  --  14* 12* 7* 11* 15* 17* 17* 15* 16*   Glucose mg/dL 207* 82  --  164* 164* 319* 314* 247* 226* 207* 119* 87   Glucose, UA   --   --  Negative  --   --   --   --   --   --   --   --   --    BUN, Bld mg/dL 26* 33*  --  41* 41* 39* 35* 29* 27* 23 19 17   Creatinine mg/dL 1.7* 2.3*  --  2.8* 2.7* 2.9* 2.8* 2.5* 2.4* 2.1* 2.1* 1.8*   Creatinine, Random Ur mg/dL  --   --  90.3  90.3  --   --   --   --   --   --   --   --   --    Albumin g/dL 3.8 3.7  --   --   --   --  2.9* 3.1* 2.8* 2.4* 3.3* 3.3*  3.3*   Total Bilirubin mg/dL 1.6* 2.6*  --   --   --   --   --  3.0*  --  2.8*  --  3.9*   Alkaline Phosphatase U/L 106 128  --   --   --   --   --  173*  --  133  --  174*   AST U/L 53* 1,052*  --   --   --   --   --  12,294*  --  9,429*  --  5,873*   ALT U/L 42 689*  --   --   --   --   --  4,994*  --  3,921*  --  3,568*   Magnesium mg/dL 1.8  --   --   --   --   --  1.8  --  2.3 2.1 2.2 2.2   Phosphorus mg/dL  --   --   --   --   --   --  6.3*  --  3.2 3.5 4.2 4.0       Vancomycin Administrations:  vancomycin given in the last 96 hours                     vancomycin 1,250 mg in sodium chloride 0.9% 250 mL (mg) 1,250 mg New Bag 05/02/20 0831    vancomycin 1.5 g in dextrose 5 % 250 mL IVPB (ready to mix) (g) 1.5 g New Bag 05/01/20 1231                       Microbiologic Results:  Microbiology Results (last 7 days)       Procedure Component Value Units Date/Time    Culture, Respiratory with Gram Stain [[id number]] Collected:  05/02/20 1400    Order Status:  Completed Specimen:  Respiratory from Endotracheal Aspirate Updated:  05/03/20 0128     Gram Stain (Respiratory) <10 epithelial cells per low power field.     Gram Stain (Respiratory) Rare WBC's     Gram Stain (Respiratory) No organisms seen    Blood culture [[id number]] Collected:  05/01/20 1222    Order Status:  Completed Specimen:  Blood Updated:  05/02/20 2212     Blood Culture, Routine No Growth to date      No Growth to date    Blood culture [[id number]] Collected:  05/01/20 1222    Order Status:  Completed Specimen:  Blood Updated:  05/02/20 2212     Blood Culture, Routine No Growth to date      No Growth to date    Blood culture [[id number]]     Order Status:  Canceled Specimen:  Blood     Blood culture [[id number]]     Order Status:  Canceled Specimen:  Blood

## 2020-05-03 NOTE — ASSESSMENT & PLAN NOTE
- A-Fib RVR with rate to 120s-140s on presentation. Started on amiodarone gtt 04/30.  - Apixaban 5mg PO BID held with worsened shock/DIC.  - Converted to NSR 05/01 in AM.  - With significant bradycardia, amiodarone and metoprolol held.  - Appreciate cardiology assistance.

## 2020-05-03 NOTE — ASSESSMENT & PLAN NOTE
Contributing Nutrition Diagnosis  Enteral Nutrition Composistion Inconsistent with Needs    Related to (etiology):   On mechanical ventilation     Signs and Symptoms (as evidenced by):   Pt receiving Novasource Renal at 10 mL/hr, this formula will not optimally meet kcal and protein needs     Interventions:  Collaboration with other providers  Enteral Nutrition     Nutrition Diagnosis Status:   New

## 2020-05-03 NOTE — PROCEDURES
[facility]     PRELIMINARY C-EEG REPORT:  Review: 5/3/2020, 16:35 (start) - 18:19     At onset, sharply contoured delta-theta (3-5 Hz) activity is seen mixed with generalized epileptiform (sharp waves) activity in a fluctuating pattern, suggestive of ictal-interictal continuum.    After Lorazepam is given ~ 17:30, the epileptiform discharges are noted to resolve and background of theta > delta (3-5 Hz) activity with very brief (1-2 seconds) periods of generalized suppression is noted until end of review.    Recommend:  1) management of ictal-interictal continuum: start AED: Lacosamide 10mg/kg IV load once, followed by maintenance of 100mg q 12h IV  2) avoid agents that lower seizure threshold: ex: Cefipime  3) management of underlying etiology - as per primary team    Full report to follow.  Will continue to monitor.     Initial findings and recs were discussed with primary team.    Thank you for involving us in the care of this patient.  Yahaira Merino MD, ERICA(), FACNS, FAGE.  Neurology-Epilepsy.  [facility].

## 2020-05-03 NOTE — PROGRESS NOTES
Cardiology  Progress Notes            Subjective:  Awake, confused, on the ventilator.  Had a sinus pause and sinus bradycardia transiently yesterday, when court temperature was low.    Sandra Oseguera is a 80 y.o.female with hypertension, hypercholesterolemia and diabetes mellitus type 2. She has a healthy weight. She has rheumatoid arthritis involving knees, hands and back. In the summer of 2013 she developed progressive exertional dyspnea occasionally associated with mild chest pressure. She had an Echocardiogram that revealed findings consistent with hypertensive heart disease with a high LVEDP. She had low exercise tolerance on a Stress MPI but no defects. She was began on furosemide and she was breathing easier. There was no wheezing. She had no exertional chest pain but mild to moderate exertional dyspnea if walking fast. Her heart rate was 70-80 bpm at rest and no longer raced with activities. As it appeared her exertional dyspnea was out of proportion to her cardiac findings she was referrred for PFTs that were done on 1/13/2017. They revealed small airway obstruction without significant response to inhalers. She saw Dr. Jarek Wade and was prescribed Anoro inhalers and a rescue inhaler. She also got enrolled in pulmonary rehabilitation and did 25 sessions. She was breathing better and thought her legs held her back more than her breathing. In late 8/2019 she noted palpitations and then slowly got more and more short of breath. She was seen by Dr. Mauricio Sood on 9/11/2019 and was noted to be in atrial fibrillation with moderately fast VRR. She was admitted, diuresed and anticoagulated. The VRR was controlled with metoprolol. On 9/12/2019 she had an Echo that revealed normal left ventricular size with mild systolic dysfunction. The EF was 45%. There was moderate LVH and the LA was moderately dilated. There was moderate MR and severe TR with the SPAP being elevated to 67 mmHg. On 9/13/2019 she  "underwent a CRISTELA guided CV. The DONATO had mild spontaneous echocardiographic contrast and there was moderate MR. She was converted to sinus with a 100 J shock. She was feeling better in sinus rhythm. She has not had any clinical recurrence until recently. On about 4/27/2020 she noted that she was short of breath with minimal activities. Her dyspnea got worse and she noted that her heart was racing with minimal activities. She was advised to go to the ER and was in atrial fibrillation with fast VRR and in heart failure. She was admitted.      Hospital Course:     4/30/2020: Began amiodarone.     5/1/2020: Converted to sinus rhythm.  Amiodarone on hold due to sinus bradycardia.  Amiodarone continues on hold, sinus bradycardia persists.  On low-dose vasopressors.     Vital Signs:  Vitals:    05/03/20 1030 05/03/20 1045 05/03/20 1100 05/03/20 1357   BP:   123/68    Pulse: 64 64 64 84   Resp: (!) 22 (!) 24 (!) 23 (!) 27   Temp: 96.1 °F (35.6 °C) 96.1 °F (35.6 °C) 96.1 °F (35.6 °C) 96.4 °F (35.8 °C)   TempSrc:       SpO2: 97% 98% 97% (!) 94%   Weight:   61.8 kg (136 lb 3.9 oz)    Height:   5' 3" (1.6 m)        Physical Exam:  Chest clear  Heart regular.  No murmur or gallop.  Laboratory:  CBC:   Recent Labs   Lab 05/03/20  1222   WBC 31.51*   RBC 2.80*   HGB 9.1*   HCT 28.6*   PLT 72*   *   MCH 32.5*   MCHC 31.8*     BMP:   Recent Labs   Lab 05/03/20  1222   GLU 57*      K 4.9   CL 99   CO2 12*   BUN 15   CREATININE 1.7*   CALCIUM 8.9   MG 2.4     CMP:   Recent Labs   Lab 05/03/20  0342 05/03/20  1222   GLU 87 57*   CALCIUM 8.4* 8.9   ALBUMIN 3.3*  3.3* 3.7   PROT 6.4  --    * 136   K 4.4 4.9   CO2 16* 12*   CL 99 99   BUN 17 15   CREATININE 1.8* 1.7*   ALKPHOS 174*  --    ALT 3,568*  --    AST 5,873*  --    BILITOT 3.9*  --      LFTs:   Recent Labs   Lab 05/03/20  0342 05/03/20  1222   ALT 3,568*  --    AST 5,873*  --    ALKPHOS 174*  --    BILITOT 3.9*  --    PROT 6.4  --    ALBUMIN 3.3*  3.3* 3.7 "     Coagulation:   Recent Labs   Lab 05/03/20  1222   INR 2.7*     Cardiac markers:   Recent Labs   Lab 05/01/20  1223   TROPONINI 0.289*       Imaging:  CT Chest Abdomen Pelvis With Contrast   Final Result   Abnormal      This report was flagged in Epic as abnormal.      1. Bilateral pleural effusions, moderate to large on the right, mild-to-moderate on the left.  There is associated compressive atelectasis of the adjacent pulmonary parenchyma however there may be more focal consolidation within the right upper lobe versus rounded atelectasis.  Correlation advised as developing infection not excluded.   2. Findings suggesting hepatic steatosis, correlation with LFTs recommended.   3. Mild abdominopelvic ascites and body wall anasarca suggests volume overload.   4. Please see above for several additional findings.         Electronically signed by: Denis Genao MD   Date:    05/02/2020   Time:    12:15      US Retroperitoneal Complete (Kidney and   Final Result      Bilateral elevated renal indices.      Right perinephric fluid.      Possible left pleural effusion.      Ascites.         Electronically signed by: Nori Vazquez   Date:    05/01/2020   Time:    18:43      X-Ray Chest 1 View   Final Result   Abnormal      Low position of endotracheal tube.  Findings were called to the patient's nurseNo, in ICU at 16:28      Otherwise satisfactory life support devices      Stable findings of CHF and pulmonary edema      This report was flagged in Epic as abnormal.         Electronically signed by: Leroy Romero Jr   Date:    05/01/2020   Time:    16:29      X-Ray Chest AP Portable   Final Result      Findings suggestive of CHF and pulmonary edema.         Electronically signed by: Leroy Rmoero Jr   Date:    04/30/2020   Time:    13:58            Problems:   1. Septic shock due to uncertain source  2. Paroxysmal atrial fibrillation  3. Of acute hypoxemic respiratory failure  4. Acute renal  failure  5. Rheumatoid arthritis  6. Hypertensive heart disease  7. Diabetes mellitus  8. Restrictive lung disease              Plan of Care:  Continue to hold amiodarone.        Kojo Coombs

## 2020-05-03 NOTE — ASSESSMENT & PLAN NOTE
Oliguric DUNIA on CKD, likely due to ATN from septic shock. Complicated by hyperkalemia and AGMA- both improved on CRRT.   -appreciate Nephrology's assistance  -renally dose meds & avoid nephrotoxic meds as able  -dosing vanc based on levels per pharmacy

## 2020-05-03 NOTE — PLAN OF CARE
Patient received and remained on the mechanical ventilator with no change in respiratory status, will continue to monitor.

## 2020-05-03 NOTE — PLAN OF CARE
Recommendation:   1. Initiate TF regimen of Peptamen Intense VHP at goal rate of 45 mL/hr to provide 1080 kcal, 99 gm pro, and 907 mL/free water. Additional 75 mL free water flush TID or per MD.   2. RD will continue to monitor TF tolerance, and nutrition needs    Goals:   TF regimen to be altered by RD follow up    Nutrition Goal Status: new  Communication of RD Recs: other (comment)(POC)    Problem: Nutrition Impairment (Mechanical Ventilation, Invasive)  Goal: Optimal Nutrition Delivery  Outcome: Ongoing, Progressing

## 2020-05-03 NOTE — ASSESSMENT & PLAN NOTE
- On insulin detemir 15U subQ daily, linagliptin 5mg PO daily at home.  - NPO, but receiving D5 as base for some continuous gtts.  - Continue insulin detemir at 7U subQ qHS, moderate-dose sliding scale insulin aspart 1-10U subQ q6hr PRN.  - Minimize D5 base as feasible for IV infusions.

## 2020-05-03 NOTE — PROGRESS NOTES
Progress Note  Nephrology    Admit Date: 4/30/2020   LOS: 3 days     SUBJECTIVE:     Follow-up For: DUNIA, hyperkalemia    Interval History: Pt. remains intubated, sedated, on 2 pressors, but lower dose levophed. Seen on CRRT. She remains anuric. Net + 270mL    OBJECTIVE:     Vital Signs (Most Recent)  Temp: (!) 95.7 °F (35.4 °C) (05/03/20 0930)  Pulse: 64 (05/03/20 0930)  Resp: (!) 23 (05/03/20 0930)  BP: (!) 116/57 (05/03/20 0900)  SpO2: 96 % (05/03/20 0930)    Vital Signs Range (Last 24H):  Temp:  [94.6 °F (34.8 °C)-97.5 °F (36.4 °C)]   Pulse:  [48-68]   Resp:  [22-26]   BP: ()/(53-84)   SpO2:  [93 %-100 %]   Arterial Line BP: ()/(40-72)     Review of Systems: Unable to obtain, as pt intubated and sedated.    Physical Exam:  Gen: frail, intubated, sedated but awakes easily and moves ext, hypothermic on Ray hugger  HEENT: sclera anicteric, ETT in place  CV: RRR, no m/r  Resp: coarse breath   GI: soft, hypoactive bowel sounds  Skin: warm, dry  Extr: no edema  Access: R IJ trialysis    Laboratory:  Recent Labs   Lab 05/02/20 1216 05/02/20 1934 05/03/20  0342   * 134* 134*   K 4.0 4.4 4.4   CL 96 97 99   CO2 17* 15* 16*   BUN 23 19 17   CREATININE 2.1* 2.1* 1.8*   CALCIUM 7.2* 8.2* 8.4*   PHOS 3.5 4.2 4.0     Recent Labs   Lab 05/02/20 1216 05/02/20 1934 05/03/20  0342   WBC 35.07* 35.17* 36.82*   HGB 9.8* 9.6* 9.5*   HCT 29.4* 29.5* 29.7*   PLT 79* 62* 76*        Diagnostic Results:  Labs: Reviewed  X-Ray: Reviewed  Echo: Reviewed    ASSESSMENT/PLAN:     Anuric DUNIA on CKD Stage 3 with shock (?septic) after PEA arrest  - known to me from nephrology clinic.  -Recent baseline Cr~ 1.2-1.4.  - Discussed initiation of CRRT with daughter Janine and granddaughter Jerilyn and phone consent obtained.  Started CVVH without volume removal along with NaHCO3 drip.  - On Broad spectrum abx with Azithro, Cefepime, VAnco.  -Blood Cx NGTD, sputum cx, no urine avail for cx, re-order .  - CXR with b/l pleural  effusions.    Renal U/S with R perinephric fluid.    -CT abdomen/pelvis/ chest again with effusion/ atelectasis/ consolidation vs compressive changes in lungs, left nonobstructive nephrolithiasis with bilateral perinephric fat stranding, left greater than right.  There is some indistinctness about the proximal left ureter  persistent lactic acidosis.    -A very minute sample of urine sent for culture this am.  -Discussed with team source remains unclear    Afib with RVR, acute on chronic diastolic heart failure  - s/p cardioversion by Dr. Calderon in 9/2019.  - Was on Amio now stopped due to bradycardia.  -HR better over last day    Lactic acidosis  - due to presumed septic shock and PEA arrest, but due to ?  - On bicarb gtt which can now be stopped  - Continue CRRT.    Shock liver  - from ischemic hepatitis    Anemia/ Thrombocytopenia/DIC/ Coagulopathy  -Given FFP.  -No obvious bleeding, slight decline in Hgb to 9.1, plt stable to slightly better 72    RA  - on minimal doses of MTX at home.  - Followed by Dr. Figueroa.    Case discussed at bedside with Dr. Guerra/CCT    Total critical care time (exclusive of procedural time) : 60 minutes  Critical care was necessary to treat or prevent imminent or life-threatening deterioration of the following conditions: acute hypoxic respiratory failure, PEA arrest, acute renal failure.    Critical care was time spent personally by me on the following activities: development of treatment plan with patient or surrogate, discussions with consultants, interpretation of cardiac output measurements, examination of patient, ordering and performing treatments and interventions, evaluation of patient's response to treatment, obtaining history from patient or surrogate, ordering and review of laboratory studies, ordering and review of radiographic studies, re-evaluation of patient's condition, pulse oximetry and blood draw for specimens    Thank you for allowing me to participate in the  care of this patient.      Rica Bee MD  Nephrology

## 2020-05-03 NOTE — ASSESSMENT & PLAN NOTE
Persistent lactic acidosis of unclear etiology. No obvious source of sepsis at this time.  -serial lactate levels  -repeat CK today  -EEG to evaluate for NCSE; keep on propofol until this can be performed

## 2020-05-03 NOTE — CONSULTS
"  [facility]  Adult Nutrition  Consult Note    SUMMARY     Recommendations    Recommendation:   1. Initiate TF regimen of Peptamen Intense VHP at goal rate of 45 mL/hr to provide 1080 kcal, 99 gm pro, and 907 mL/free water. Additional 75 mL free water flush TID or per MD.   2. RD will continue to monitor TF tolerance, and nutrition needs    Goals:   TF regimen to be altered by RD follow up    Nutrition Goal Status: new  Communication of RD Recs: other (comment)(POC)    Reason for Assessment    Reason For Assessment: consult(intubated)  Diagnosis: (Atrial fibrillation with rapid ventricular response)  Relevant Medical History: HTN, DM2, CKD2, GERD  Interdisciplinary Rounds: did not attend    General Information Comments:  5.3.20- Pt was intubated on 5/1 in the evening. Has NGT, trialysis and PIV in place. Currently on sedatives and pressors, propofol on hole for the past several hours. Currently recieving Novasource Renal at 10 mL/hr.   5.1.20- Pt is a 80 year old female admitted with SOB and poor PO  x 3 day. Pt is on 2000kcal diabetic/cardiac diet. PO intake 0-25% of meals. UBW 130lbs, +11lbs x 1 day. Pt needs reweight. Due to recent hospital wide restrictions to limit the transfer of COVID-19, we are not performing any physical exams at this time. All S/S will be observational; NFPE to be performed at a future date.    Nutrition Discharge Planning: Adequate nutrition to meet needs via Renal/ Diabetic diet.     Nutrition Risk Screen    Nutrition Risk Screen: dysphagia or difficulty swallowing    Nutrition/Diet History    Food Preferences: MITCH  Spiritual, Cultural Beliefs, Gnosticist Practices, Values that Affect Care: no  Food Allergies: NKFA  Factors Affecting Nutritional Intake: NPO, on mechanical ventilation    Anthropometrics    Temp: 96.1 °F (35.6 °C)  Height Method: Stated  Height: 5' 3" (160 cm)  Height (inches): 63 in  Weight Method: Bed Scale  Weight: 61.8 kg (136 lb 3.9 oz)  Weight " (lb): 136.25 lb  Ideal Body Weight (IBW), Female: 115 lb  % Ideal Body Weight, Female (lb): 118.48 %  BMI (Calculated): 24.1  BMI Grade: 18.5-24.9 - normal  Usual Body Weight (UBW), k kg  % Usual Body Weight: 104.96  % Weight Change From Usual Weight: 4.74 %       Lab/Procedures/Meds    Pertinent Labs Reviewed: reviewed  Pertinent Labs Comments: H/H 9.5/29.7, Na 134, CO2 16, Cr 1.8, eGFR 30, Ca 8.4, Alb 3.3, Alk Phos 174, Bili 3.9, AST 5873, ALT 3568  Pertinent Medications Reviewed: reviewed  Pertinent Medications Comments: cefepime, chlorhexidine, famotidine,fludrocortisone, fydrocortisone,insulin detemir, Na citrate, fentanyl, norepinephrine, vasopressin    Physical Findings/Assessment    Ken Score: 13    Estimated/Assessed Needs    Weight Used For Calorie Calculations: 61.8 kg (136 lb 3.9 oz)  Energy Calorie Requirements (kcal): 1098  Energy Need Method: [employer] [facility]  Protein Requirements: 81-93(1.3-1.5 gm/kg)  Weight Used For Protein Calculations: 61.9 kg (136 lb 7.4 oz)  Fluid Requirements (mL): 1mL/kcal or per MD  Estimated Fluid Requirement Method: RDA Method  RDA Method (mL): 1098  CHO Requirement: 110 gm/day       Nutrition Prescription Ordered    Current Diet Order: NPO  Current Nutrition Support Formula Ordered: Novasource Renal  Current Nutrition Support Rate Ordered: 10 (ml)  Current Nutrition Support Frequency Ordered: mL/hr    Evaluation of Received Nutrient/Fluid Intake    Enteral Calories (kcal): 480  Enteral Protein (gm): 22  Enteral (Free Water) Fluid (mL): 172  % Kcal Needs: 44  % Protein Needs: 24-27  I/O: 2910.2736  Energy Calories Required: not meeting needs  Protein Required: not meeting needs  Fluid Required: not meeting needs  Comments: LBM 4.30.20  % Intake of Estimated Energy Needs: 25 - 50 %  % Meal Intake: NPO    Nutrition Risk    Level of Risk/Frequency of Follow-up: (2 /week)     Assessment and Plan    Acute respiratory failure with hypoxemia  Contributing Nutrition  Diagnosis  Enteral Nutrition Composistion Inconsistent with Needs    Related to (etiology):   On mechanical ventilation     Signs and Symptoms (as evidenced by):   Pt receiving Novasource Renal at 10 mL/hr, this formula will not optimally meet kcal and protein needs     Interventions:  Collaboration with other providers  Enteral Nutrition     Nutrition Diagnosis Status:   New           Monitor and Evaluation    Food and Nutrient Intake: energy intake, enteral nutrition intake  Food and Nutrient Adminstration: diet order, enteral and parenteral nutrition administration  Knowledge/Beliefs/Attitudes: food and nutrition knowledge/skill  Physical Activity and Function: nutrition-related ADLs and IADLs  Anthropometric Measurements: weight, weight change, body mass index  Biochemical Data, Medical Tests and Procedures: electrolyte and renal panel, gastrointestinal profile, glucose/endocrine profile, inflammatory profile, lipid profile  Nutrition-Focused Physical Findings: overall appearance     Malnutrition Assessment     Due to recent hospital wide restrictions to limit the transfer of (COVID-19), we are not performing any physical exams at this time. All S/S will be observational; NFPE to be performed at a future date.    Nutrition Follow-Up    RD Follow-up?: Yes

## 2020-05-03 NOTE — SUBJECTIVE & OBJECTIVE
Interval History: No acute events overnight.     Review of Systems   Unable to perform ROS: Intubated     Objective:     Vital Signs (Most Recent):  Temp: 96.4 °F (35.8 °C) (05/03/20 1357)  Pulse: 84 (05/03/20 1357)  Resp: (!) 27 (05/03/20 1357)  BP: 123/68 (05/03/20 1100)  SpO2: (!) 94 % (05/03/20 1357) Vital Signs (24h Range):  Temp:  [94.6 °F (34.8 °C)-97.5 °F (36.4 °C)] 96.4 °F (35.8 °C)  Pulse:  [56-84] 84  Resp:  [22-27] 27  SpO2:  [93 %-100 %] 94 %  BP: (102-146)/(53-81) 123/68  Arterial Line BP: (100-148)/(40-70) 122/56     Weight: 61.8 kg (136 lb 3.9 oz)  Body mass index is 24.13 kg/m².    Intake/Output Summary (Last 24 hours) at 5/3/2020 1530  Last data filed at 5/3/2020 1512  Gross per 24 hour   Intake 2653.63 ml   Output 3346 ml   Net -692.37 ml      Physical Exam   Constitutional: She appears well-developed.   HENT:   Head: Normocephalic and atraumatic.   Eyes: Conjunctivae are normal. Right eye exhibits no discharge. Left eye exhibits no discharge.   Cardiovascular: Normal rate.   Peripheral pulses dopplerable.   Pulmonary/Chest: No respiratory distress.   Intubated. Mechanical breath sounds.   Abdominal: Soft. There is no tenderness.   Musculoskeletal: She exhibits no edema.   Neurological:   RASS -3, CAM/ICU N/A. Eyes will open to stimulus but does not track or follow commands.   Skin: Skin is warm and dry.   Nursing note and vitals reviewed.    Significant Labs:   CBC:  Recent Labs   Lab 05/02/20  1934 05/03/20  0342 05/03/20  1222   WBC 35.17* 36.82* 31.51*   HGB 9.6* 9.5* 9.1*   HCT 29.5* 29.7* 28.6*   PLT 62* 76* 72*   GRAN 84.0* 87.0* 80.0*   LYMPH 2.0*  CANCELED 2.0*  CANCELED 5.0*  CANCELED   MONO 4.0  CANCELED 5.0  CANCELED 6.0  CANCELED   EOS CANCELED CANCELED CANCELED   BASO CANCELED CANCELED CANCELED     CMP:  Recent Labs   Lab 05/02/20  0403  05/02/20  1216 05/02/20  1934 05/03/20  0342 05/03/20  1222   *   < > 132* 134* 134* 136   K 4.4   < > 4.0 4.4 4.4 4.9   CL 95   < > 96  97 99 99   CO2 15*   < > 17* 15* 16* 12*   BUN 29*   < > 23 19 17 15   CREATININE 2.5*   < > 2.1* 2.1* 1.8* 1.7*   *   < > 207* 119* 87 57*   CALCIUM 8.4*   < > 7.2* 8.2* 8.4* 8.9   MG  --    < > 2.1 2.2 2.2 2.4   PHOS  --    < > 3.5 4.2 4.0 5.1*   ALKPHOS 173*  --  133  --  174*  --    AST 12,294*  --  9,429*  --  5,873*  --    ALT 4,994*  --  3,921*  --  3,568*  --    BILITOT 3.0*  --  2.8*  --  3.9*  --    PROT 6.5  --  5.0*  --  6.4  --    ALBUMIN 3.1*   < > 2.4* 3.3* 3.3*  3.3* 3.7   ANIONGAP 23*   < > 19* 22* 19* 25*    < > = values in this interval not displayed.     Recent Labs   Lab 05/02/20  1216  05/03/20  1222   LACTATE 10.2*   < > >12.0*   CPK 1190*  --  3473*   DDIMER 15.81*  --   --    INR >10.0*   < > 2.7*    < > = values in this interval not displayed.     Significant Imaging:   CT Chest/Abd/Pelvis W Contrast 05/02/20:  Bilateral pleural effusions, moderate to large on the right, mild-to-moderate on the left.  There is associated compressive atelectasis of the adjacent pulmonary parenchyma however there may be more focal consolidation within the right upper lobe versus rounded atelectasis.  Correlation advised as developing infection not excluded. Findings suggesting hepatic steatosis. Mild abdominopelvic ascites and body wall anasarca suggests volume overload.

## 2020-05-03 NOTE — ASSESSMENT & PLAN NOTE
- Shock initially believed to be cardiogenic but no significant improvement after rhythm converted to sinus 05/01 with amiodarone. Suspect septic, though not febrile on presentation and no significant symptoms, though on methotrexate outpatient. Unclear etiology. Placed on empiric antibiotic therapy 05/01.  - Decompensation afternoon 05/01 with unresponsive episode and lose of pulse with bradycardia. ROSC with 1 cycle CPR and epinephrine x1. Intubated, A-line and central line placed. Started on pressor support with norepinephrine and vasopressin gtts.   - CT Chest/Abd/Pelvis without clear source of infection; did have some perinephric stranding and L proximal ureteral inflammation, so urinary source under consideration. CXR more consistent with pulmonary edema 2/2 CHF exacerbation on presentation, UA unremarkable for infection at time of admit. Attempting to re-collect urine as feasible given her oliguric DUNIA / CRRT.  - Lactate persistently >12. Trend.  - Cultures still showing NGTD.  - Continuing broad spectrum empiric antibiotic coverage with azithromycin 500mg IV q24hr, cefepime 2g IV q8hr, vancomycin IV with pharmacy dosing consult. WBCs with some mild improvement; likely will de-escalate azithromycin first.  - Appreciate critical care assistance.

## 2020-05-03 NOTE — PROGRESS NOTES
[facility] Hospital Medicine  Progress Note    Patient Name: Sandra Oseguera  MRN: [id number]  Patient Class: IP- Inpatient   Admission Date: 4/30/2020  Length of Stay: 3 days  Attending Physician: MOSES Guerra MD  Primary Care Provider: America Potts MD        Subjective:     Principal Problem:Septic shock    HPI:  Ms. Oseguera is a 80/F with PMH A-fib (s/p DCCV 09/2019, on apixaban), HFpEF (2D Echo 09/2019 EF 60%), HTN, DMII, RA who presented to ED 04/30 with a three day history of worsening SOB. She reports she has dyspnea on exertion at baseline but feels that it has worsened over the several days prior to admission. Denies orthopnea. Reports associated palpitations in addition, and some mild dry cough. She has had some abdominal cramping and loose stool and poor PO intake over the past several days. She denies fever, chills, myalgias or sore throat. She did not take her medications the morning of presentation, but has been taking them consistently beforehand. Upon presentation to ED she was found to have atrial fibrillation with RVR with rate to the 130s-140s. She received metoprolol succinate 200mg PO and metoprolol 5mg IV x 3 without significant response. Lab evaluation was notable for DUNIA, elevated anion gap, and elevated BNP. CXR demonstrated some pulmonary edema. Hospital medicine was contacted for admission.    Overview/Hospital Course:  No notes on file    Interval History: No acute events overnight.     Review of Systems   Unable to perform ROS: Intubated     Objective:     Vital Signs (Most Recent):  Temp: 96.4 °F (35.8 °C) (05/03/20 1357)  Pulse: 84 (05/03/20 1357)  Resp: (!) 27 (05/03/20 1357)  BP: 123/68 (05/03/20 1100)  SpO2: (!) 94 % (05/03/20 1357) Vital Signs (24h Range):  Temp:  [94.6 °F (34.8 °C)-97.5 °F (36.4 °C)] 96.4 °F (35.8 °C)  Pulse:  [56-84] 84  Resp:  [22-27] 27  SpO2:  [93 %-100 %] 94 %  BP: (102-146)/(53-81) 123/68  Arterial Line BP: (100-148)/(40-70)  122/56     Weight: 61.8 kg (136 lb 3.9 oz)  Body mass index is 24.13 kg/m².    Intake/Output Summary (Last 24 hours) at 5/3/2020 1530  Last data filed at 5/3/2020 1512  Gross per 24 hour   Intake 2653.63 ml   Output 3346 ml   Net -692.37 ml      Physical Exam   Constitutional: She appears well-developed.   HENT:   Head: Normocephalic and atraumatic.   Eyes: Conjunctivae are normal. Right eye exhibits no discharge. Left eye exhibits no discharge.   Cardiovascular: Normal rate.   Peripheral pulses dopplerable.   Pulmonary/Chest: No respiratory distress.   Intubated. Mechanical breath sounds.   Abdominal: Soft. There is no tenderness.   Musculoskeletal: She exhibits no edema.   Neurological:   RASS -3, CAM/ICU N/A. Eyes will open to stimulus but does not track or follow commands.   Skin: Skin is warm and dry.   Nursing note and vitals reviewed.    Significant Labs:   CBC:  Recent Labs   Lab 05/02/20 1934 05/03/20 0342 05/03/20  1222   WBC 35.17* 36.82* 31.51*   HGB 9.6* 9.5* 9.1*   HCT 29.5* 29.7* 28.6*   PLT 62* 76* 72*   GRAN 84.0* 87.0* 80.0*   LYMPH 2.0*  CANCELED 2.0*  CANCELED 5.0*  CANCELED   MONO 4.0  CANCELED 5.0  CANCELED 6.0  CANCELED   EOS CANCELED CANCELED CANCELED   BASO CANCELED CANCELED CANCELED     CMP:  Recent Labs   Lab 05/02/20  0403  05/02/20  1216 05/02/20 1934 05/03/20  0342 05/03/20  1222   *   < > 132* 134* 134* 136   K 4.4   < > 4.0 4.4 4.4 4.9   CL 95   < > 96 97 99 99   CO2 15*   < > 17* 15* 16* 12*   BUN 29*   < > 23 19 17 15   CREATININE 2.5*   < > 2.1* 2.1* 1.8* 1.7*   *   < > 207* 119* 87 57*   CALCIUM 8.4*   < > 7.2* 8.2* 8.4* 8.9   MG  --    < > 2.1 2.2 2.2 2.4   PHOS  --    < > 3.5 4.2 4.0 5.1*   ALKPHOS 173*  --  133  --  174*  --    AST 12,294*  --  9,429*  --  5,873*  --    ALT 4,994*  --  3,921*  --  3,568*  --    BILITOT 3.0*  --  2.8*  --  3.9*  --    PROT 6.5  --  5.0*  --  6.4  --    ALBUMIN 3.1*   < > 2.4* 3.3* 3.3*  3.3* 3.7   ANIONGAP 23*   < > 19*  22* 19* 25*    < > = values in this interval not displayed.     Recent Labs   Lab 05/02/20  1216  05/03/20  1222   LACTATE 10.2*   < > >12.0*   CPK 1190*  --  3473*   DDIMER 15.81*  --   --    INR >10.0*   < > 2.7*    < > = values in this interval not displayed.     Significant Imaging:   CT Chest/Abd/Pelvis W Contrast 05/02/20:  Bilateral pleural effusions, moderate to large on the right, mild-to-moderate on the left.  There is associated compressive atelectasis of the adjacent pulmonary parenchyma however there may be more focal consolidation within the right upper lobe versus rounded atelectasis.  Correlation advised as developing infection not excluded. Findings suggesting hepatic steatosis. Mild abdominopelvic ascites and body wall anasarca suggests volume overload.      Assessment/Plan:      * Septic shock  - Shock initially believed to be cardiogenic but no significant improvement after rhythm converted to sinus 05/01 with amiodarone. Suspect septic, though not febrile on presentation and no significant symptoms, though on methotrexate outpatient. Unclear etiology. Placed on empiric antibiotic therapy 05/01.  - Decompensation afternoon 05/01 with unresponsive episode and lose of pulse with bradycardia. ROSC with 1 cycle CPR and epinephrine x1. Intubated, A-line and central line placed. Started on pressor support with norepinephrine and vasopressin gtts.   - CT Chest/Abd/Pelvis without clear source of infection; did have some perinephric stranding and L proximal ureteral inflammation, so urinary source under consideration. CXR more consistent with pulmonary edema 2/2 CHF exacerbation on presentation, UA unremarkable for infection at time of admit. Attempting to re-collect urine as feasible given her oliguric DUNIA / CRRT.  - Lactate persistently >12. Trend.  - Cultures still showing NGTD.  - Continuing broad spectrum empiric antibiotic coverage with azithromycin 500mg IV q24hr, cefepime 2g IV q8hr, vancomycin IV  with pharmacy dosing consult. WBCs with some mild improvement; likely will de-escalate azithromycin first.  - Appreciate critical care assistance.    DIC (disseminated intravascular coagulation)  - Significant elevation in INR, D-dimer elevated, fibrinogen low on 05/02 consistent with DIC secondary to shock.  - Transfused 2U FFP, received vitamin K 10mg IV on 05/02 with subsequent improvement.  - Monitor.    Metabolic encephalopathy  - In setting of shock.    Acute respiratory failure with hypoxemia  - Worsening respiratory failure requiring intubation 05/01.  - Ventilatory requirements improving.  - Appreciate pulmonology assistance with vent management.    Shock liver  - As under shock.    DUNIA (acute kidney injury)  - DUNIA with oliguria in setting of shock.  - Concurrent acidosis. Started on CRRT 05/01.  - Appreciate nephrology assistance.    Chronic anticoagulation  - As under A-fib RVR.    Atrial fibrillation with rapid ventricular response  - A-Fib RVR with rate to 120s-140s on presentation. Started on amiodarone gtt 04/30.  - Apixaban 5mg PO BID held with worsened shock/DIC.  - Converted to NSR 05/01 in AM.  - With significant bradycardia, amiodarone and metoprolol held.  - Appreciate cardiology assistance.    Rheumatoid arthritis  - Home methotrexate held.    Hypercholesterolemia  - Atorvastatin 20mg PO daily held in setting of shock liver.    Acute on chronic diastolic heart failure  - Acute on chronic diastolic heart failure suspected 2/2 A-fib RVR.  - Initial CXR with pulmonary edema, BNP elevated. Some pleural effusion noted as well.  - 2D Echo 05/01 demonstrated EF 55%, no RV strain noted.  - On CRRT as under DUNIA.  - Strict intake/output, daily weights.    CKD (chronic kidney disease) stage 3, GFR 30-59 ml/min  - As under DUNIA.    Type 2 diabetes mellitus, with long-term current use of insulin  - On insulin detemir 15U subQ daily, linagliptin 5mg PO daily at home.  - NPO, but receiving D5 as base for some  continuous gtts.  - Continue insulin detemir at 7U subQ qHS, moderate-dose sliding scale insulin aspart 1-10U subQ q6hr PRN.  - Minimize D5 base as feasible for IV infusions.    Hypertension  - Hold benazepril 20mg PO daily, metoprolol 200mg PO daily in setting of shock.    Restrictive lung disease  - On umeclidinium/vilanterol, albuterol at home.    VTE Risk Mitigation (From admission, onward)         Ordered     heparin (porcine) injection 5,000 Units  Use PRN      05/01/20 1707     IP VTE HIGH RISK PATIENT  Once      04/30/20 1831     Place sequential compression device  Until discontinued      04/30/20 1831              Critical due to septic shock requiring pressors, respiratory failure requiring ventilatory support, DIC.    Critical care time spent on the evaluation and treatment of severe organ dysfunction, review of pertinent labs and imaging studies, discussions with consulting providers and discussions with patient/family: 45 minutes.     RACHEL Guerra MD  Department of Hospital Medicine   [facility]

## 2020-05-03 NOTE — PROGRESS NOTES
Patient seen and examined by me. I agree with the trainee's separate note except as modified.     No acute events reported overnight.    I/O 2850/2550 (no urine output)    Afebrile, HR 60s, MAP 70 on norepi and vaso. 97% on 24%.     WBC down to 36. Hgb 9.5. Plt 76. Bicarb 16. LFTs downtrending. Lactate down to 11    CT A/P: bilateral pleural effusions R>L with compressive atelectasis. Hepatic steatosis. Mild ascites. Bilateral perinephric fat stranding    Cxs NGTD    On my exam: opens eyes to voice but does not track. Bilateral pleural U/S done--small amount of simple fluid     Impression: severe septic shock, now seems unlikely that this is related to a pneumonia given her excellent oxygenation. Perinephric stranding is the only abnormality on CT that would be c/w an infection. Nothing drainable on CT scan      Recommendations:   -stop azithromycin  -cont vanco for now, along with cefepime   -steroids for severe septic shock  -cont vasopressors  -cont low tidal vent strategy  -cont TFs    Critical care time (35min) spent personally by me on the following activities: development of treatment plan with patient or surrogate and bedside caregivers, discussions with consultants, evaluation of patient's response to treatment, examination of patient, ordering and performing treatments and interventions, ordering and review of laboratory studies, ordering and review of radiographic studies, pulse oximetry, re-evaluation of patient's condition. This critical care time did not overlap with that of any other provider or involve time for any procedures.

## 2020-05-03 NOTE — ASSESSMENT & PLAN NOTE
Shock- likely septic, although the source is unknown. Lactic acidosis slowly improving. With her chronic immunosuppression, she may not develop typical infectious symptoms, and no source has yet been identified. Now complicated by severe MOF.  -blood & resp cultures pending  -high risk urine culture today  -Empiric broad antibiotic coverage required with her immunosuppression. Stopping azithromycin given low suspicion for respiratory source.  -serial LA until clears  -Unable to perform diagnostic para or thora with coagulopathy. No evidence of loculations or high risk features to suggest a complicated pleural space infection.  -vasopressors to maintain MAP >65; currently requiring vaso & norepi  -con't stress dose hydrocortisone + fludrocortisone x 7 days

## 2020-05-03 NOTE — ASSESSMENT & PLAN NOTE
-decreased long-acting insulin by 30%  -monitor for hypoglycemia due to DUNIA and hepatic dysfunction  -goal -180 while admitted  -if persistent hypoglycemia, may need D10W infusion today

## 2020-05-03 NOTE — PLAN OF CARE
Patient received on vent with 7.5 ETT secured at 21@lip on documented settings. Q6 aerosol treatments given tolerated well. ABG done today RR decreased 22 then increased to 25 post lab result per Dr. VLAD De Leon.PCC. No other changes to pt respiratory status this shift,monitoring ongoing.

## 2020-05-04 VITALS
OXYGEN SATURATION: 14 % | DIASTOLIC BLOOD PRESSURE: 21 MMHG | TEMPERATURE: 94 F | BODY MASS INDEX: 24.14 KG/M2 | SYSTOLIC BLOOD PRESSURE: 60 MMHG | WEIGHT: 136.25 LBS | HEIGHT: 63 IN

## 2020-05-04 LAB
BACTERIA UR CULT: NO GROWTH
HAV IGM SERPL QL IA: ABNORMAL
HBV CORE IGM SERPL QL IA: NEGATIVE
HBV SURFACE AG SERPL QL IA: NEGATIVE
HCV AB SERPL QL IA: NEGATIVE

## 2020-05-04 NOTE — HOSPITAL COURSE
Admitted with presumed heart failure exacerbation with A-fib RVR and placed on amiodarone gtt. Cardiology consulted and planned for cardioversion . She spontaneously converted the morning of  and cardioversion was deferred. She did have rising WBCs and metabolic acidosis; critical care was consulted and labs showed significant lactic acidosis consistent with shock as well as mildly elevated procalcitonin and drastic LFT elevation concerning for shock liver. Broad spectrum antibiotics were initiated and cultures collected, though she had no focal findings for infectious process. The afternoon of  she decompensated and had decreasing blood pressures requiring pressor support. She had bradycardia and PEA arrest with ROSC within 1 cycle and epinephrine x 1. Arterial line and dialysis line placed and she began CRRT that evening. Lactate was consistently >12 and CT Chest/Abd/Pelvis was performed to evaluate for potential source, with nonspecific inflammation about the kidneys bilaterally but no evidence of abscess or obstruction. Initial UA was unremarkable, and she was oliguric making repeat UA/culture difficult. She continued to require pressor support and labs demonstrated DIC, for which she received FFP and vitamin K. Without significant improvement and nonresponsive episodes while on vent but sedation weaned, EEG was ordered and demonstrated intermittent epileptiform activity and she received IV antiepileptics; ampicillin and acyclovir were added as well. The evening of  she continued to worsen and family was contacted; she was made DNR. Her clinical status continued to worsen and she ultimately  on  at 2259 of presumed septic shock.

## 2020-05-04 NOTE — PROCEDURES
ICU EEG/VIDEO MONITORING REPORT    Sandra Oseguera  [id number]  1940    DATE OF SERVICE: 5/3/2020    DATE OF ADMISSION: 4/30/2020 12:02 PM    ADMITTING PROVIDER: MOSES Guerra MD    METHODOLOGY   Electroencephalographic (EEG) recording is with electrodes placed according to the International 10-20 placement system.  Thirty two (32) channels of digital signal are simultaneously recorded from the scalp and may include EKG, EMG, and/or eye monitors.   Recording band pass was 0.1 to 512 hz.  Digital video recording of the patient is simultaneously recorded with the EEG.  The nursing staff report clinical symptoms and may press an event button when the patient has symptoms of clinical interest to the treating physicians.  EEG and video recording is stored and archived in digital format.  The entire recording is visually reviewed and the times identified by computer analysis as being spikes or seizures are reviewed again.  Activation procedures which include photic stimulation, hyperventilation and instructing patients to perform simple task are done in selected patients.   Compresses spectral analysis (CSA) is also performed on the activity recorded from each individual channel.  This is displayed as a power display of frequencies from 0 to 30 Hz over time.   The CSA analysis is done and displayed continuously.  This is reviewed for asymmetries in power between homologous areas of the scalp and for presence of changes in power which canbe seen when seizures occur.  Sections of suspected abnormalities on the CSA is then compared with the original EEG recording.     [employer] software was also utilized in the review of this study.  This software suite analyzes the EEG recording in multiple domains.  Coherence and rhythmicity is computed to identify EEG sections which may contain organized seizures.  Each channel undergoes analysis to detect presence of spike and sharp waves which have special and morphological  characteristic of epileptic activity.  The routine EEG recording is converted from spacial into frequency domain.  This is then displayed comparing homologous areas to identify areas of significant asymmetry.  Algorithm to identify non-cortically generated artifact is used to separate eye movement, EMG and other artifact from the EEG.      Recording Times  Start on 5/3/2020, 16:35  Stop on 5/4/2020, 00:05    A total of 7 hours and 30 minutes of EEG was recorded.    EEG FINDINGS  Background activity:   The background rhythm was characterized by sharply contoured delta-theta (3-5 Hz) activity   No posterior dominant rhythm seen.   Symmetry and continuity: the background was continuous and symmetric    Sleep:   Not seen.    Activation procedures:   Not done    Abnormal activity:   At onset, generalized epileptiform (sharp waves) activity is seen in a fluctuating pattern without definite evolution into electrographic seizures. No associated clinical correlate seen.     After Lorazepam is given ~ 17:30, the epileptiform discharges are noted to resolve and background of theta > delta (3-5 Hz) activity with very brief (1-2 seconds) periods of generalized suppression is noted. This pattern persists until ~22:30.    Mostly generalized suppression with occasional very brief (1-3 seconds) periods of low amplitude delta-theta activity is seen subsequently until 22:37.    No discernable cerebral activity is seen after 22:37.      EKG:   Irregular rhythm seen at onset with bradycardia noted after 22:37     IMPRESSION:   At onset, fluctuating epileptiform activity noted, suggestive of ictal-interictal continuum.  This transitions into generalized severe slowing suggestive of severe cerebral dysfunction.  However, no discernable cerebral activity was noted after 22:37    Findings were conveyed to the primary team on multiple occasions.   CLINICAL CORRELATION IS RECOMMENDED.    Yahaira Merino MD, ERICA(), FACNS,  LUIS ANTONIO.  Neurology-Epilepsy.  [facility].

## 2020-05-04 NOTE — PLAN OF CARE
Ms. Oseguera has had deterioration in her status over the past few hours, requiring increased vasopressors and recurrent bradycardic episodes. She has been started on a dextrose & bicarb infusions, dopamine, and epinephrine in addition to her vaso & norepi. Her recurrent episodes are not fully explained by her acidosis, BG, temperature, seizures. She had minimal response to supplemental calcium in response to a low level. Her DIC appears to be worsening again, she has mild hyperkalemia despite uninterrupted CRRT. ABG has worsened despite addition of a bicarb infusion and increased MV on the vent. Despite a HR in the 60s, her MAPs have dropped into the 40s.    I am concerned that she has progressive MOF despite uninterrupted maximal medical treatment for her presumed sepsis. Treatment of her possible seizures per neurology's recommendations (dose adjustment per pharmacy), addition of additional chronotropic and inotropic support, optimization of her electrolytes, and additional antibiotics have failed to stabilize her status. Her family have arrived at bedside to visit her this evening given my concern that her disease has progressed to such severe MOF that it is likely irreversible. Her daughter and granddaughter at bedside understand the severity of her illness and agree that CPR would be unlikely to benefit her at this stage should her heart stop. RN present at bedside during discussion. DNR order placed.    Stephanie De Leon 05/03/2020 10:03 PM  [employer] [facility] Fellow

## 2020-05-04 NOTE — PROGRESS NOTES
Pharmacist Renal Dose Adjustment Note    Sandra Oseguera is a 80 y.o. female being treated with the medication Acyclovir 10 mg/kg q8h    Patient Data:    Vital Signs (Most Recent):  Temp: (!) 95.4 °F (35.2 °C) (05/03/20 1735)  Pulse: 64 (05/03/20 1735)  Resp: (!) 0 (05/03/20 1735)  BP: (!) 110/59 (05/03/20 1600)  SpO2: 100 % (05/03/20 1735)   Vital Signs (72h Range):  Temp:  [93.6 °F (34.2 °C)-99.3 °F (37.4 °C)]   Pulse:  []   Resp:  [0-56]   BP: ()/(25-93)   SpO2:  [61 %-100 %]   Arterial Line BP: ()/(40-74)      Recent Labs   Lab 05/02/20  1934 05/03/20  0342 05/03/20  1222   CREATININE 2.1* 1.8* 1.7*     Serum creatinine: 1.7 mg/dL (H) 05/03/20 1222  Estimated creatinine clearance: 22.2 mL/min (A)     Medication Dose Route Frequency   Previous Order Acyclovir 10 mg/kg IV q8h   New Order Acyclovir 10 mg/kg IV q24h     Renal dose adjustments performed as noted above.  We will continue monitoring and adjusting as necessary.    Pharmacist: Dav Lora, PharmD  [phone]

## 2020-05-04 NOTE — DISCHARGE SUMMARY
[facility] Hospital Medicine  Discharge Summary      Patient Name: Sandra Oseguera  MRN: [id number]  Admission Date: 4/30/2020  Hospital Length of Stay: 4 days  Discharge Date and Time:      Attending Physician: No att. providers found   Discharging Provider: RACHEL Guerra MD  Primary Care Provider: America Potts MD      HPI:   Ms. Oseguera is a 80/F with PMH A-fib (s/p DCCV 09/2019, on apixaban), HFpEF (2D Echo 09/2019 EF 60%), HTN, DMII, RA who presented to ED 04/30 with a three day history of worsening SOB. She reports she has dyspnea on exertion at baseline but feels that it has worsened over the several days prior to admission. Denies orthopnea. Reports associated palpitations in addition, and some mild dry cough. She has had some abdominal cramping and loose stool and poor PO intake over the past several days. She denies fever, chills, myalgias or sore throat. She did not take her medications the morning of presentation, but has been taking them consistently beforehand. Upon presentation to ED she was found to have atrial fibrillation with RVR with rate to the 130s-140s. She received metoprolol succinate 200mg PO and metoprolol 5mg IV x 3 without significant response. Lab evaluation was notable for DUNIA, elevated anion gap, and elevated BNP. CXR demonstrated some pulmonary edema. Hospital medicine was contacted for admission.    Procedure(s) (LRB):  CARDIOVERSION (N/A)      Hospital Course:   Admitted with presumed heart failure exacerbation with A-fib RVR and placed on amiodarone gtt. Cardiology consulted and planned for cardioversion 05/01. She spontaneously converted the morning of 05/01 and cardioversion was deferred. She did have rising WBCs and metabolic acidosis; critical care was consulted and labs showed significant lactic acidosis consistent with shock as well as mildly elevated procalcitonin and drastic LFT elevation concerning for shock liver. Broad spectrum antibiotics  were initiated and cultures collected, though she had no focal findings for infectious process. The afternoon of  she decompensated and had decreasing blood pressures requiring pressor support. She had bradycardia and PEA arrest with ROSC within 1 cycle and epinephrine x 1. Arterial line and dialysis line placed and she began CRRT that evening. Lactate was consistently >12 and CT Chest/Abd/Pelvis was performed to evaluate for potential source, with nonspecific inflammation about the kidneys bilaterally but no evidence of abscess or obstruction. Initial UA was unremarkable, and she was oliguric making repeat UA/culture difficult. She continued to require pressor support and labs demonstrated DIC, for which she received FFP and vitamin K. Without significant improvement and nonresponsive episodes while on vent but sedation weaned, EEG was ordered and demonstrated intermittent epileptiform activity and she received IV antiepileptics; ampicillin and acyclovir were added as well. The evening of  she continued to worsen and family was contacted; she was made DNR. Her clinical status continued to worsen and she ultimately  on  at 2259 of presumed septic shock.     Consults:   Consults (From admission, onward)        Status Ordering Provider     Inpatient consult to Cardiology  Once     Provider:  Garrett Calderon MD    Completed MOSES WERNER     Inpatient consult to Nephrology  Once     Provider:  Rica Bee MD    Completed MOSES WERNER     Inpatient consult to Pulmonary Critical Care  Once     Provider:  (Not yet assigned)    MOSES Martin     Inpatient consult to Registered Dietitian/Nutritionist  Once     Provider:  (Not yet assigned)    MOSES Martin     Inpatient consult to Registered Dietitian/Nutritionist  Once     Provider:  (Not yet assigned)    MOSES Martin          No new Assessment & Plan notes have been filed under this hospital  service since the last note was generated.  Service: Hospital Medicine    Final Active Diagnoses:    Diagnosis Date Noted POA    PRINCIPAL PROBLEM:  Septic shock [A41.9, R65.21] 2020 Clinically Undetermined    Metabolic encephalopathy [G93.41] 2020 No    DIC (disseminated intravascular coagulation) [D65] 2020 No    Hypothermia [T68.XXXA] 2020 No    Elevated troponin [R79.89] 2020 Yes    Increased anion gap metabolic acidosis [E87.2] 2020 Yes    Shock liver [K72.00] 2020 Yes    Hyperkalemia [E87.5] 2020 Yes    Lactic acidosis [E87.2] 2020 Yes    Acute hypoxemic respiratory failure [J96.01] 2020 Unknown    DUNIA (acute kidney injury) [N17.9] 2020 Yes     Chronic    High risk medication use [Z79.899] 2020 Not Applicable    Chronic anticoagulation [Z79.01] 2019 Not Applicable     Chronic    Atrial fibrillation with rapid ventricular response [I48.91] 2019 Yes    Right bundle branch block [I45.10] 2018 Yes     Chronic    Hypercholesterolemia [E78.00] 2014 Yes     Chronic    Rheumatoid arthritis [M06.9] 2014 Yes     Chronic    Acute on chronic diastolic heart failure [I50.33] 2014 Yes    Type 2 diabetes mellitus, with long-term current use of insulin [E11.9, Z79.4] 2014 Not Applicable     Chronic    CKD (chronic kidney disease) stage 3, GFR 30-59 ml/min [N18.3] 2014 Yes     Chronic    Hypertension [I10] 2014 Yes     Chronic    Restrictive lung disease [J98.4] 2014 Yes     Chronic      Problems Resolved During this Admission:    Diagnosis Date Noted Date Resolved POA    Cardiogenic shock [R57.0] 2020 Yes       Discharged Condition:     Disposition:     Follow Up:    Patient Instructions:   No discharge procedures on file.    Significant Diagnostic Studies:   Recent Results (from the past 72 hour(s))   Basic metabolic panel    Collection Time:  05/01/20  4:25 PM   Result Value Ref Range    Sodium 134 (L) 136 - 145 mmol/L    Potassium 5.8 (H) 3.5 - 5.1 mmol/L    Chloride 95 95 - 110 mmol/L    CO2 7 (LL) 23 - 29 mmol/L    Glucose 319 (H) 70 - 110 mg/dL    BUN, Bld 39 (H) 8 - 23 mg/dL    Creatinine 2.9 (H) 0.5 - 1.4 mg/dL    Calcium 9.7 8.7 - 10.5 mg/dL    Anion Gap 32 (H) 8 - 16 mmol/L    eGFR if African American 17 (A) >60 mL/min/1.73 m^2    eGFR if non African American 15 (A) >60 mL/min/1.73 m^2   Lactic Acid, Plasma    Collection Time: 05/01/20  4:25 PM   Result Value Ref Range    Lactate (Lactic Acid) >12.0 (HH) 0.5 - 2.2 mmol/L   ISTAT PROCEDURE    Collection Time: 05/01/20  5:09 PM   Result Value Ref Range    POC PH 7.052 (LL) 7.35 - 7.45    POC PCO2 40.1 35 - 45 mmHg    POC PO2 198 (H) 80 - 100 mmHg    POC HCO3 11.1 (L) 24 - 28 mmol/L    POC BE -19 -2 to 2 mmol/L    POC SATURATED O2 99 95 - 100 %    Rate 16     Sample ARTERIAL     Site Caron/UAC     Allens Test N/A     DelSys Adult Vent     Mode AC/PRVC     Vt 320     PEEP 5     PiP 21     FiO2 70     Min Vol 4.99     Sp02 94    POCT glucose    Collection Time: 05/01/20  5:16 PM   Result Value Ref Range    POCT Glucose 431 (H) 70 - 110 mg/dL   POCT glucose    Collection Time: 05/01/20  5:17 PM   Result Value Ref Range    POCT Glucose 393 (H) 70 - 110 mg/dL   ISTAT PROCEDURE    Collection Time: 05/01/20  8:05 PM   Result Value Ref Range    POC PH 7.369 7.35 - 7.45    POC PCO2 20.1 (LL) 35 - 45 mmHg    POC PO2 226 (H) 80 - 100 mmHg    POC HCO3 11.6 (L) 24 - 28 mmol/L    POC BE -14 -2 to 2 mmol/L    POC SATURATED O2 100 95 - 100 %    Rate 30     Sample ARTERIAL     Site Caron/UAC     Allens Test N/A     DelSys Adult Vent     Mode AC/PRVC     Vt 420     PEEP 5     FiO2 60    Lactic Acid, Plasma    Collection Time: 05/01/20  8:07 PM   Result Value Ref Range    Lactate (Lactic Acid) >12.0 (HH) 0.5 - 2.2 mmol/L   POCT glucose    Collection Time: 05/01/20  9:26 PM   Result Value Ref Range    POCT Glucose 390  (H) 70 - 110 mg/dL   Renal function panel    Collection Time: 05/01/20 11:20 PM   Result Value Ref Range    Glucose 314 (H) 70 - 110 mg/dL    Sodium 133 (L) 136 - 145 mmol/L    Potassium 4.6 3.5 - 5.1 mmol/L    Chloride 94 (L) 95 - 110 mmol/L    CO2 11 (L) 23 - 29 mmol/L    BUN, Bld 35 (H) 8 - 23 mg/dL    Calcium 8.5 (L) 8.7 - 10.5 mg/dL    Creatinine 2.8 (H) 0.5 - 1.4 mg/dL    Albumin 2.9 (L) 3.5 - 5.2 g/dL    Phosphorus 6.3 (H) 2.7 - 4.5 mg/dL    eGFR if African American 18 (A) >60 mL/min/1.73 m^2    eGFR if non African American 15 (A) >60 mL/min/1.73 m^2    Anion Gap 28 (H) 8 - 16 mmol/L   Magnesium    Collection Time: 05/01/20 11:20 PM   Result Value Ref Range    Magnesium 1.8 1.6 - 2.6 mg/dL   ISTAT PROCEDURE    Collection Time: 05/01/20 11:20 PM   Result Value Ref Range    POC PH 7.296 (L) 7.35 - 7.45    POC PCO2 27.4 (LL) 35 - 45 mmHg    POC PO2 144 (H) 80 - 100 mmHg    POC HCO3 13.3 (L) 24 - 28 mmol/L    POC BE -13 -2 to 2 mmol/L    POC SATURATED O2 99 95 - 100 %    Rate 25     Sample ARTERIAL     Site Caron/UAC     Allens Test N/A     DelSys Adult Vent     Mode AC/PRVC     Vt 420     PEEP 5     FiO2 50     Sp02 96    Lactic Acid, Plasma    Collection Time: 05/02/20 12:13 AM   Result Value Ref Range    Lactate (Lactic Acid) >12.0 (HH) 0.5 - 2.2 mmol/L   POCT glucose    Collection Time: 05/02/20  3:57 AM   Result Value Ref Range    POCT Glucose 256 (H) 70 - 110 mg/dL   Vancomycin, random    Collection Time: 05/02/20  4:03 AM   Result Value Ref Range    Vancomycin, Random 17.2 Not established ug/mL   Anti-Xa Heparin Monitoring    Collection Time: 05/02/20  4:03 AM   Result Value Ref Range    Heparin Anti-Xa >1.50 (H) 0.30 - 0.70 IU/mL   CBC auto differential    Collection Time: 05/02/20  4:03 AM   Result Value Ref Range    WBC 46.89 (H) 3.90 - 12.70 K/uL    RBC 3.65 (L) 4.00 - 5.40 M/uL    Hemoglobin 11.7 (L) 12.0 - 16.0 g/dL    Hematocrit 35.6 (L) 37.0 - 48.5 %    Mean Corpuscular Volume 98 82 - 98 fL    Mean  Corpuscular Hemoglobin 32.1 (H) 27.0 - 31.0 pg    Mean Corpuscular Hemoglobin Conc 32.9 32.0 - 36.0 g/dL    RDW 16.3 (H) 11.5 - 14.5 %    Platelets 112 (L) 150 - 350 K/uL    MPV 12.1 9.2 - 12.9 fL    Immature Granulocytes 2.6 (H) 0.0 - 0.5 %    Gran # (ANC) 41.1 (H) 1.8 - 7.7 K/uL    Immature Grans (Abs) 1.21 (H) 0.00 - 0.04 K/uL    Lymph # 1.5 1.0 - 4.8 K/uL    Mono # 2.9 (H) 0.3 - 1.0 K/uL    Eos # 0.1 0.0 - 0.5 K/uL    Baso # 0.13 0.00 - 0.20 K/uL    nRBC 1 (A) 0 /100 WBC    Gran% 87.5 (H) 38.0 - 73.0 %    Lymph% 3.3 (L) 18.0 - 48.0 %    Mono% 6.1 4.0 - 15.0 %    Eosinophil% 0.2 0.0 - 8.0 %    Basophil% 0.3 0.0 - 1.9 %    Differential Method Automated    Comprehensive metabolic panel    Collection Time: 05/02/20  4:03 AM   Result Value Ref Range    Sodium 133 (L) 136 - 145 mmol/L    Potassium 4.4 3.5 - 5.1 mmol/L    Chloride 95 95 - 110 mmol/L    CO2 15 (L) 23 - 29 mmol/L    Glucose 247 (H) 70 - 110 mg/dL    BUN, Bld 29 (H) 8 - 23 mg/dL    Creatinine 2.5 (H) 0.5 - 1.4 mg/dL    Calcium 8.4 (L) 8.7 - 10.5 mg/dL    Total Protein 6.5 6.0 - 8.4 g/dL    Albumin 3.1 (L) 3.5 - 5.2 g/dL    Total Bilirubin 3.0 (H) 0.1 - 1.0 mg/dL    Alkaline Phosphatase 173 (H) 55 - 135 U/L    AST 12,294 (H) 10 - 40 U/L    ALT 4,994 (H) 10 - 44 U/L    Anion Gap 23 (H) 8 - 16 mmol/L    eGFR if African American 20 (A) >60 mL/min/1.73 m^2    eGFR if non African American 18 (A) >60 mL/min/1.73 m^2   Renal function panel    Collection Time: 05/02/20  5:47 AM   Result Value Ref Range    Glucose 226 (H) 70 - 110 mg/dL    Sodium 133 (L) 136 - 145 mmol/L    Potassium 4.3 3.5 - 5.1 mmol/L    Chloride 96 95 - 110 mmol/L    CO2 17 (L) 23 - 29 mmol/L    BUN, Bld 27 (H) 8 - 23 mg/dL    Calcium 8.1 (L) 8.7 - 10.5 mg/dL    Creatinine 2.4 (H) 0.5 - 1.4 mg/dL    Albumin 2.8 (L) 3.5 - 5.2 g/dL    Phosphorus 3.2 2.7 - 4.5 mg/dL    eGFR if African American 21 (A) >60 mL/min/1.73 m^2    eGFR if non African American 19 (A) >60 mL/min/1.73 m^2    Anion Gap 20 (H)  8 - 16 mmol/L   Magnesium    Collection Time: 05/02/20  5:47 AM   Result Value Ref Range    Magnesium 2.3 1.6 - 2.6 mg/dL   ISTAT PROCEDURE    Collection Time: 05/02/20  7:50 AM   Result Value Ref Range    POC PH 7.552 (H) 7.35 - 7.45    POC PCO2 22.3 (LL) 35 - 45 mmHg    POC PO2 176 (H) 80 - 100 mmHg    POC HCO3 19.6 (L) 24 - 28 mmol/L    POC BE -3 -2 to 2 mmol/L    POC SATURATED O2 100 95 - 100 %    Rate 25     Sample ARTERIAL     Site [facility]     Allens Test N/A     DelSys Adult Vent     Mode AC/PRVC     Vt 420     PEEP 5     PiP 25     FiO2 40     Min Vol 10.5     Sp02 100    POCT glucose    Collection Time: 05/02/20 12:12 PM   Result Value Ref Range    POCT Glucose 197 (H) 70 - 110 mg/dL   ISTAT PROCEDURE    Collection Time: 05/02/20 12:15 PM   Result Value Ref Range    POC PH 7.455 (H) 7.35 - 7.45    POC PCO2 33.3 (L) 35 - 45 mmHg    POC PO2 440 (H) 80 - 100 mmHg    POC HCO3 23.4 (L) 24 - 28 mmol/L    POC BE 0 -2 to 2 mmol/L    POC SATURATED O2 100 95 - 100 %    Rate 25     Sample ARTERIAL     Site [facility]     Allens Test N/A     DelSys Adult Vent     Mode AC/PRVC     Vt 320     PEEP 5     PiP 20     FiO2 100     Min Vol 7.84     Sp02 100    Protime-INR    Collection Time: 05/02/20 12:16 PM   Result Value Ref Range    Prothrombin Time >100.0 (H) 9.0 - 12.5 sec    INR >10.0 (HH) 0.8 - 1.2   Fibrinogen    Collection Time: 05/02/20 12:16 PM   Result Value Ref Range    Fibrinogen 148 (L) 182 - 366 mg/dL   Lactic Acid, Plasma    Collection Time: 05/02/20 12:16 PM   Result Value Ref Range    Lactate (Lactic Acid) 10.2 (HH) 0.5 - 2.2 mmol/L   CBC auto differential    Collection Time: 05/02/20 12:16 PM   Result Value Ref Range    WBC 35.07 (H) 3.90 - 12.70 K/uL    RBC 3.04 (L) 4.00 - 5.40 M/uL    Hemoglobin 9.8 (L) 12.0 - 16.0 g/dL    Hematocrit 29.4 (L) 37.0 - 48.5 %    Mean Corpuscular Volume 97 82 - 98 fL    Mean Corpuscular Hemoglobin 32.2 (H) 27.0 - 31.0 pg    Mean Corpuscular Hemoglobin Conc 33.3 32.0 - 36.0  g/dL    RDW 16.6 (H) 11.5 - 14.5 %    Platelets 79 (L) 150 - 350 K/uL    MPV 12.9 9.2 - 12.9 fL    Immature Granulocytes CANCELED 0.0 - 0.5 %    Immature Grans (Abs) CANCELED 0.00 - 0.04 K/uL    Lymph # CANCELED 1.0 - 4.8 K/uL    Mono # CANCELED 0.3 - 1.0 K/uL    Eos # CANCELED 0.0 - 0.5 K/uL    Baso # CANCELED 0.00 - 0.20 K/uL    nRBC 1 (A) 0 /100 WBC    Gran% 66.0 38.0 - 73.0 %    Lymph% 2.0 (L) 18.0 - 48.0 %    Mono% 2.0 (L) 4.0 - 15.0 %    Eosinophil% 0.0 0.0 - 8.0 %    Basophil% 0.0 0.0 - 1.9 %    Bands 29.0 %    Metamyelocytes 1.0 %    Platelet Estimate Decreased (A)     Aniso Slight     Differential Method Manual    Procalcitonin    Collection Time: 05/02/20 12:16 PM   Result Value Ref Range    Procalcitonin 1.27 (H) <0.25 ng/mL   Basic metabolic panel    Collection Time: 05/02/20 12:16 PM   Result Value Ref Range    Sodium 132 (L) 136 - 145 mmol/L    Potassium 4.0 3.5 - 5.1 mmol/L    Chloride 96 95 - 110 mmol/L    CO2 17 (L) 23 - 29 mmol/L    Glucose 207 (H) 70 - 110 mg/dL    BUN, Bld 23 8 - 23 mg/dL    Creatinine 2.1 (H) 0.5 - 1.4 mg/dL    Calcium 7.2 (L) 8.7 - 10.5 mg/dL    Anion Gap 19 (H) 8 - 16 mmol/L    eGFR if African American 25 (A) >60 mL/min/1.73 m^2    eGFR if non African American 22 (A) >60 mL/min/1.73 m^2   Phosphorus    Collection Time: 05/02/20 12:16 PM   Result Value Ref Range    Phosphorus 3.5 2.7 - 4.5 mg/dL   Magnesium    Collection Time: 05/02/20 12:16 PM   Result Value Ref Range    Magnesium 2.1 1.6 - 2.6 mg/dL   D dimer, quantitative    Collection Time: 05/02/20 12:16 PM   Result Value Ref Range    D-Dimer 15.81 (H) <0.50 mg/L [facility]   Hepatic function panel    Collection Time: 05/02/20 12:16 PM   Result Value Ref Range    Total Protein 5.0 (L) 6.0 - 8.4 g/dL    Albumin 2.4 (L) 3.5 - 5.2 g/dL    Total Bilirubin 2.8 (H) 0.1 - 1.0 mg/dL    Bilirubin, Direct 1.8 (H) 0.1 - 0.3 mg/dL    AST 9,429 (H) 10 - 40 U/L    ALT 3,921 (H) 10 - 44 U/L    Alkaline Phosphatase 133 55 - 135 U/L   C-Reactive  Protein    Collection Time: 05/02/20 12:16 PM   Result Value Ref Range    CRP 26.2 (H) 0.0 - 8.2 mg/L   CK    Collection Time: 05/02/20 12:16 PM   Result Value Ref Range    CPK 1190 (H) 20 - 180 U/L   Lipase    Collection Time: 05/02/20 12:16 PM   Result Value Ref Range    Lipase 298 (H) 4 - 60 U/L   Anti-Xa Heparin Monitoring    Collection Time: 05/02/20 12:45 PM   Result Value Ref Range    Heparin Anti-Xa >1.50 (H) 0.30 - 0.70 IU/mL   Culture, Respiratory with Gram Stain    Collection Time: 05/02/20  2:00 PM   Result Value Ref Range    Respiratory Culture NINO ALBICANS  Few   (A)     Gram Stain (Respiratory) <10 epithelial cells per low power field.     Gram Stain (Respiratory) Rare WBC's     Gram Stain (Respiratory) No organisms seen    Prepare Fresh Frozen Plasma 2 Units    Collection Time: 05/02/20  2:48 PM   Result Value Ref Range    UNIT NUMBER [id number]     Product Code Z3100T15     DISPENSE STATUS CROSSMATCHED     CODING SYSTEM OCEA548     Unit Blood Type Code 5100     Unit Blood Type O POS     Unit Expiration [id number]     UNIT NUMBER [id number]     Product Code F0025W18     DISPENSE STATUS CROSSMATCHED     CODING SYSTEM SOSG555     Unit Blood Type Code 5100     Unit Blood Type O POS     Unit Expiration [id number]    Type & Screen    Collection Time: 05/02/20  3:15 PM   Result Value Ref Range    Group & Rh O POS     Indirect Osvaldo NEG    Prepare Fresh Frozen Plasma 2 Units    Collection Time: 05/02/20  3:15 PM   Result Value Ref Range    UNIT NUMBER [id number]     Product Code E3263J54     DISPENSE STATUS TRANSFUSED     CODING SYSTEM TCBQ344     Unit Blood Type Code 5100     Unit Blood Type O POS     Unit Expiration [id number]     UNIT NUMBER [id number]     Product Code O9454P09     DISPENSE STATUS TRANSFUSED     CODING SYSTEM AQIJ832     Unit Blood Type Code 5100     Unit Blood Type O POS     Unit Expiration [id number]    Prepare Fresh Frozen Plasma 1 Unit    Collection Time:  05/02/20  3:15 PM   Result Value Ref Range    UNIT NUMBER [id number]     Product Code G5091R03     DISPENSE STATUS TRANSFUSED     CODING SYSTEM FVPC967     Unit Blood Type Code 5100     Unit Blood Type O POS     Unit Expiration [id number]    Renal function panel    Collection Time: 05/02/20  7:34 PM   Result Value Ref Range    Glucose 119 (H) 70 - 110 mg/dL    Sodium 134 (L) 136 - 145 mmol/L    Potassium 4.4 3.5 - 5.1 mmol/L    Chloride 97 95 - 110 mmol/L    CO2 15 (L) 23 - 29 mmol/L    BUN, Bld 19 8 - 23 mg/dL    Calcium 8.2 (L) 8.7 - 10.5 mg/dL    Creatinine 2.1 (H) 0.5 - 1.4 mg/dL    Albumin 3.3 (L) 3.5 - 5.2 g/dL    Phosphorus 4.2 2.7 - 4.5 mg/dL    eGFR if African American 25 (A) >60 mL/min/1.73 m^2    eGFR if non African American 22 (A) >60 mL/min/1.73 m^2    Anion Gap 22 (H) 8 - 16 mmol/L   Magnesium    Collection Time: 05/02/20  7:34 PM   Result Value Ref Range    Magnesium 2.2 1.6 - 2.6 mg/dL   CBC auto differential    Collection Time: 05/02/20  7:34 PM   Result Value Ref Range    WBC 35.17 (H) 3.90 - 12.70 K/uL    RBC 2.98 (L) 4.00 - 5.40 M/uL    Hemoglobin 9.6 (L) 12.0 - 16.0 g/dL    Hematocrit 29.5 (L) 37.0 - 48.5 %    Mean Corpuscular Volume 99 (H) 82 - 98 fL    Mean Corpuscular Hemoglobin 32.2 (H) 27.0 - 31.0 pg    Mean Corpuscular Hemoglobin Conc 32.5 32.0 - 36.0 g/dL    RDW 16.8 (H) 11.5 - 14.5 %    Platelets 62 (L) 150 - 350 K/uL    MPV 12.6 9.2 - 12.9 fL    Immature Granulocytes CANCELED 0.0 - 0.5 %    Immature Grans (Abs) CANCELED 0.00 - 0.04 K/uL    Lymph # CANCELED 1.0 - 4.8 K/uL    Mono # CANCELED 0.3 - 1.0 K/uL    Eos # CANCELED 0.0 - 0.5 K/uL    Baso # CANCELED 0.00 - 0.20 K/uL    nRBC 2 (A) 0 /100 WBC    Gran% 84.0 (H) 38.0 - 73.0 %    Lymph% 2.0 (L) 18.0 - 48.0 %    Mono% 4.0 4.0 - 15.0 %    Eosinophil% 0.0 0.0 - 8.0 %    Basophil% 0.0 0.0 - 1.9 %    Bands 10.0 %    Platelet Estimate Decreased (A)     Aniso Slight     Poik Slight     Tear Drop Cells Occasional     [place] Cells Occasional      Schistocytes Present     Differential Method Manual    Lactic Acid, Plasma    Collection Time: 05/02/20  7:34 PM   Result Value Ref Range    Lactate (Lactic Acid) >12.0 (HH) 0.5 - 2.2 mmol/L   POCT glucose    Collection Time: 05/02/20  7:43 PM   Result Value Ref Range    POCT Glucose 115 (H) 70 - 110 mg/dL   POCT glucose    Collection Time: 05/03/20 12:26 AM   Result Value Ref Range    POCT Glucose 73 70 - 110 mg/dL   Vancomycin, random    Collection Time: 05/03/20  3:42 AM   Result Value Ref Range    Vancomycin, Random 17.2 Not established ug/mL   Lactic Acid, Plasma    Collection Time: 05/03/20  3:42 AM   Result Value Ref Range    Lactate (Lactic Acid) 11.1 (HH) 0.5 - 2.2 mmol/L   Procalcitonin    Collection Time: 05/03/20  3:42 AM   Result Value Ref Range    Procalcitonin 1.78 (H) <0.25 ng/mL   Hepatic function panel    Collection Time: 05/03/20  3:42 AM   Result Value Ref Range    Total Protein 6.4 6.0 - 8.4 g/dL    Albumin 3.3 (L) 3.5 - 5.2 g/dL    Total Bilirubin 3.9 (H) 0.1 - 1.0 mg/dL    Bilirubin, Direct 2.4 (H) 0.1 - 0.3 mg/dL    AST 5,873 (H) 10 - 40 U/L    ALT 3,568 (H) 10 - 44 U/L    Alkaline Phosphatase 174 (H) 55 - 135 U/L   CBC auto differential    Collection Time: 05/03/20  3:42 AM   Result Value Ref Range    WBC 36.82 (H) 3.90 - 12.70 K/uL    RBC 2.96 (L) 4.00 - 5.40 M/uL    Hemoglobin 9.5 (L) 12.0 - 16.0 g/dL    Hematocrit 29.7 (L) 37.0 - 48.5 %    Mean Corpuscular Volume 100 (H) 82 - 98 fL    Mean Corpuscular Hemoglobin 32.1 (H) 27.0 - 31.0 pg    Mean Corpuscular Hemoglobin Conc 32.0 32.0 - 36.0 g/dL    RDW 17.0 (H) 11.5 - 14.5 %    Platelets 76 (L) 150 - 350 K/uL    MPV 12.9 9.2 - 12.9 fL    Immature Granulocytes CANCELED 0.0 - 0.5 %    Immature Grans (Abs) CANCELED 0.00 - 0.04 K/uL    Lymph # CANCELED 1.0 - 4.8 K/uL    Mono # CANCELED 0.3 - 1.0 K/uL    Eos # CANCELED 0.0 - 0.5 K/uL    Baso # CANCELED 0.00 - 0.20 K/uL    nRBC 5 (A) 0 /100 WBC    Gran% 87.0 (H) 38.0 - 73.0 %    Lymph% 2.0 (L)  18.0 - 48.0 %    Mono% 5.0 4.0 - 15.0 %    Eosinophil% 0.0 0.0 - 8.0 %    Basophil% 0.0 0.0 - 1.9 %    Bands 5.0 %    Metamyelocytes 1.0 %    Aniso Slight     Hypo Occasional     Tear Drop Cells Occasional     Schistocytes Present     Differential Method Manual    Renal function panel    Collection Time: 05/03/20  3:42 AM   Result Value Ref Range    Glucose 87 70 - 110 mg/dL    Sodium 134 (L) 136 - 145 mmol/L    Potassium 4.4 3.5 - 5.1 mmol/L    Chloride 99 95 - 110 mmol/L    CO2 16 (L) 23 - 29 mmol/L    BUN, Bld 17 8 - 23 mg/dL    Calcium 8.4 (L) 8.7 - 10.5 mg/dL    Creatinine 1.8 (H) 0.5 - 1.4 mg/dL    Albumin 3.3 (L) 3.5 - 5.2 g/dL    Phosphorus 4.0 2.7 - 4.5 mg/dL    eGFR if African American 30 (A) >60 mL/min/1.73 m^2    eGFR if non African American 26 (A) >60 mL/min/1.73 m^2    Anion Gap 19 (H) 8 - 16 mmol/L   Magnesium    Collection Time: 05/03/20  3:42 AM   Result Value Ref Range    Magnesium 2.2 1.6 - 2.6 mg/dL   POCT glucose    Collection Time: 05/03/20  4:03 AM   Result Value Ref Range    POCT Glucose 92 70 - 110 mg/dL   POCT glucose    Collection Time: 05/03/20  6:27 AM   Result Value Ref Range    POCT Glucose 79 70 - 110 mg/dL   POCT glucose    Collection Time: 05/03/20  7:31 AM   Result Value Ref Range    POCT Glucose 81 70 - 110 mg/dL   ISTAT PROCEDURE    Collection Time: 05/03/20  7:32 AM   Result Value Ref Range    POC PH 7.383 7.35 - 7.45    POC PCO2 30.7 (L) 35 - 45 mmHg    POC PO2 97 80 - 100 mmHg    POC HCO3 18.3 (L) 24 - 28 mmol/L    POC BE -7 -2 to 2 mmol/L    POC SATURATED O2 98 95 - 100 %    Rate 25     Sample ARTERIAL     Site Caron/UAC     Allens Test Pass     DelSys Adult Vent     Mode AC/PRVC     Vt 320     PEEP 5     PiP 21     FiO2 24     Min Vol 7.75     Sp02 96    Protime-INR    Collection Time: 05/03/20  7:33 AM   Result Value Ref Range    Prothrombin Time 56.0 (H) 9.0 - 12.5 sec    INR 5.6 (HH) 0.8 - 1.2   Fibrinogen    Collection Time: 05/03/20  7:33 AM   Result Value Ref Range     Fibrinogen 158 (L) 182 - 366 mg/dL   POCT glucose    Collection Time: 05/03/20 12:18 PM   Result Value Ref Range    POCT Glucose 56 (L) 70 - 110 mg/dL   CBC auto differential    Collection Time: 05/03/20 12:22 PM   Result Value Ref Range    WBC 31.51 (H) 3.90 - 12.70 K/uL    RBC 2.80 (L) 4.00 - 5.40 M/uL    Hemoglobin 9.1 (L) 12.0 - 16.0 g/dL    Hematocrit 28.6 (L) 37.0 - 48.5 %    Mean Corpuscular Volume 102 (H) 82 - 98 fL    Mean Corpuscular Hemoglobin 32.5 (H) 27.0 - 31.0 pg    Mean Corpuscular Hemoglobin Conc 31.8 (L) 32.0 - 36.0 g/dL    RDW 17.3 (H) 11.5 - 14.5 %    Platelets 72 (L) 150 - 350 K/uL    MPV 13.1 (H) 9.2 - 12.9 fL    Immature Granulocytes CANCELED 0.0 - 0.5 %    Immature Grans (Abs) CANCELED 0.00 - 0.04 K/uL    Lymph # CANCELED 1.0 - 4.8 K/uL    Mono # CANCELED 0.3 - 1.0 K/uL    Eos # CANCELED 0.0 - 0.5 K/uL    Baso # CANCELED 0.00 - 0.20 K/uL    nRBC 9 (A) 0 /100 WBC    Gran% 80.0 (H) 38.0 - 73.0 %    Lymph% 5.0 (L) 18.0 - 48.0 %    Mono% 6.0 4.0 - 15.0 %    Eosinophil% 1.0 0.0 - 8.0 %    Basophil% 0.0 0.0 - 1.9 %    Bands 8.0 %    Platelet Estimate Decreased (A)     Aniso Slight     Poik Slight     Poly Occasional     Andrey Cells Occasional     Vacuolated Granulocytes Present     Differential Method Manual    Protime-INR    Collection Time: 05/03/20 12:22 PM   Result Value Ref Range    Prothrombin Time 28.5 (H) 9.0 - 12.5 sec    INR 2.7 (H) 0.8 - 1.2   Fibrinogen    Collection Time: 05/03/20 12:22 PM   Result Value Ref Range    Fibrinogen 193 182 - 366 mg/dL   Renal function panel    Collection Time: 05/03/20 12:22 PM   Result Value Ref Range    Glucose 57 (L) 70 - 110 mg/dL    Sodium 136 136 - 145 mmol/L    Potassium 4.9 3.5 - 5.1 mmol/L    Chloride 99 95 - 110 mmol/L    CO2 12 (L) 23 - 29 mmol/L    BUN, Bld 15 8 - 23 mg/dL    Calcium 8.9 8.7 - 10.5 mg/dL    Creatinine 1.7 (H) 0.5 - 1.4 mg/dL    Albumin 3.7 3.5 - 5.2 g/dL    Phosphorus 5.1 (H) 2.7 - 4.5 mg/dL    eGFR if  33 (A)  >60 mL/min/1.73 m^2    eGFR if non African American 28 (A) >60 mL/min/1.73 m^2    Anion Gap 25 (H) 8 - 16 mmol/L   Magnesium    Collection Time: 05/03/20 12:22 PM   Result Value Ref Range    Magnesium 2.4 1.6 - 2.6 mg/dL   Lactic Acid, Plasma    Collection Time: 05/03/20 12:22 PM   Result Value Ref Range    Lactate (Lactic Acid) >12.0 (HH) 0.5 - 2.2 mmol/L   CK    Collection Time: 05/03/20 12:22 PM   Result Value Ref Range    CPK 3473 (H) 20 - 180 U/L   POCT glucose    Collection Time: 05/03/20  2:29 PM   Result Value Ref Range    POCT Glucose 91 70 - 110 mg/dL   POCT glucose    Collection Time: 05/03/20  6:15 PM   Result Value Ref Range    POCT Glucose 32 (LL) 70 - 110 mg/dL   POCT glucose    Collection Time: 05/03/20  6:16 PM   Result Value Ref Range    POCT Glucose 33 (LL) 70 - 110 mg/dL   ISTAT PROCEDURE    Collection Time: 05/03/20  7:15 PM   Result Value Ref Range    POC PH 7.246 (LL) 7.35 - 7.45    POC PCO2 24.7 (LL) 35 - 45 mmHg    POC PO2 93 80 - 100 mmHg    POC HCO3 10.7 (L) 24 - 28 mmol/L    POC BE -17 -2 to 2 mmol/L    POC SATURATED O2 96 95 - 100 %    Rate 25     Sample ARTERIAL     Site Caron/UAC     Allens Test N/A     DelSys Adult Vent     Mode AC/PRVC     Vt 320     PEEP 5     FiO2 21    POCT glucose    Collection Time: 05/03/20  7:17 PM   Result Value Ref Range    POCT Glucose 133 (H) 70 - 110 mg/dL   CBC auto differential    Collection Time: 05/03/20  8:11 PM   Result Value Ref Range    WBC 27.60 (H) 3.90 - 12.70 K/uL    RBC 2.24 (L) 4.00 - 5.40 M/uL    Hemoglobin 7.3 (L) 12.0 - 16.0 g/dL    Hematocrit 23.9 (L) 37.0 - 48.5 %    Mean Corpuscular Volume 107 (H) 82 - 98 fL    Mean Corpuscular Hemoglobin 32.6 (H) 27.0 - 31.0 pg    Mean Corpuscular Hemoglobin Conc 30.5 (L) 32.0 - 36.0 g/dL    RDW 17.8 (H) 11.5 - 14.5 %    Platelets 63 (L) 150 - 350 K/uL    MPV 12.0 9.2 - 12.9 fL    Immature Granulocytes Test Not Performed 0.0 - 0.5 %    Gran # (ANC) Test Not Performed 1.8 - 7.7 K/uL    Immature Grans  (Abs) Test Not Performed 0.00 - 0.04 K/uL    Lymph # Test Not Performed 1.0 - 4.8 K/uL    Mono # Test Not Performed 0.3 - 1.0 K/uL    Eos # Test Not Performed 0.0 - 0.5 K/uL    Baso # Test Not Performed 0.00 - 0.20 K/uL    nRBC 15 (A) 0 /100 WBC    Gran% 91.0 (H) 38.0 - 73.0 %    Lymph% 2.0 (L) 18.0 - 48.0 %    Mono% 6.0 4.0 - 15.0 %    Eosinophil% 0.0 0.0 - 8.0 %    Basophil% 0.0 0.0 - 1.9 %    Bands 1.0 %    Platelet Estimate Decreased (A)     Aniso Slight     Poik Slight     Poly Occasional     [place] Cells Occasional     Vacuolated Granulocytes Present     Differential Method Manual    Protime-INR    Collection Time: 05/03/20  8:11 PM   Result Value Ref Range    Prothrombin Time 28.8 (H) 9.0 - 12.5 sec    INR 2.8 (H) 0.8 - 1.2   Fibrinogen    Collection Time: 05/03/20  8:11 PM   Result Value Ref Range    Fibrinogen 156 (L) 182 - 366 mg/dL   Magnesium    Collection Time: 05/03/20  8:11 PM   Result Value Ref Range    Magnesium 2.5 1.6 - 2.6 mg/dL   Lactic Acid, Plasma    Collection Time: 05/03/20  8:11 PM   Result Value Ref Range    Lactate (Lactic Acid) >12.0 (HH) 0.5 - 2.2 mmol/L   Basic metabolic panel    Collection Time: 05/03/20  8:11 PM   Result Value Ref Range    Sodium 136 136 - 145 mmol/L    Potassium 5.4 (H) 3.5 - 5.1 mmol/L    Chloride 100 95 - 110 mmol/L    CO2 10 (L) 23 - 29 mmol/L    Glucose 110 70 - 110 mg/dL    BUN, Bld 12 8 - 23 mg/dL    Creatinine 1.6 (H) 0.5 - 1.4 mg/dL    Calcium 8.2 (L) 8.7 - 10.5 mg/dL    Anion Gap 26 (H) 8 - 16 mmol/L    eGFR if African American 35 (A) >60 mL/min/1.73 m^2    eGFR if non African American 30 (A) >60 mL/min/1.73 m^2   Calcium, ionized    Collection Time: 05/03/20  8:11 PM   Result Value Ref Range    Calcium, Ion 0.89 (L) 1.06 - 1.42 mmol/L   Magnesium    Collection Time: 05/03/20  8:11 PM   Result Value Ref Range    Magnesium 2.5 1.6 - 2.6 mg/dL   Troponin I    Collection Time: 05/03/20  8:11 PM   Result Value Ref Range    Troponin I 0.790 (H) 0.000 - 0.026 ng/mL    POCT glucose    Collection Time: 05/03/20  8:17 PM   Result Value Ref Range    POCT Glucose 114 (H) 70 - 110 mg/dL   CBC auto differential    Collection Time: 05/03/20  8:53 PM   Result Value Ref Range    WBC 27.19 (H) 3.90 - 12.70 K/uL    RBC 2.25 (L) 4.00 - 5.40 M/uL    Hemoglobin 7.4 (L) 12.0 - 16.0 g/dL    Hematocrit 23.9 (L) 37.0 - 48.5 %    Mean Corpuscular Volume 106 (H) 82 - 98 fL    Mean Corpuscular Hemoglobin 32.9 (H) 27.0 - 31.0 pg    Mean Corpuscular Hemoglobin Conc 31.0 (L) 32.0 - 36.0 g/dL    RDW 18.0 (H) 11.5 - 14.5 %    Platelets 61 (L) 150 - 350 K/uL    MPV 12.9 9.2 - 12.9 fL    Immature Granulocytes CANCELED 0.0 - 0.5 %    Immature Grans (Abs) CANCELED 0.00 - 0.04 K/uL    Lymph # CANCELED 1.0 - 4.8 K/uL    Mono # CANCELED 0.3 - 1.0 K/uL    Eos # CANCELED 0.0 - 0.5 K/uL    Baso # CANCELED 0.00 - 0.20 K/uL    nRBC 15 (A) 0 /100 WBC    Gran% 88.0 (H) 38.0 - 73.0 %    Lymph% 5.0 (L) 18.0 - 48.0 %    Mono% 5.0 4.0 - 15.0 %    Eosinophil% 0.0 0.0 - 8.0 %    Basophil% 0.0 0.0 - 1.9 %    Bands 2.0 %    Platelet Estimate Decreased (A)     Aniso Slight     Poik Slight     Poly Occasional     [place] Cells Occasional     Vacuolated Granulocytes Present     Differential Method Manual    POCT glucose    Collection Time: 05/03/20  8:55 PM   Result Value Ref Range    POCT Glucose 114 (H) 70 - 110 mg/dL   POCT glucose    Collection Time: 05/03/20  9:42 PM   Result Value Ref Range    POCT Glucose 139 (H) 70 - 110 mg/dL   ISTAT PROCEDURE    Collection Time: 05/03/20  9:49 PM   Result Value Ref Range    POC PH 7.186 (LL) 7.35 - 7.45    POC PCO2 25.0 (LL) 35 - 45 mmHg    POC PO2 85 80 - 100 mmHg    POC HCO3 9.5 (L) 24 - 28 mmol/L    POC BE -19 -2 to 2 mmol/L    POC SATURATED O2 94 (L) 95 - 100 %    Rate 30     Sample ARTERIAL     Site Caron/UAC     Allens Test N/A     DelSys Adult Vent     Mode AC/PRVC     Vt 320     PEEP 5     FiO2 21      Pending Diagnostic Studies:     None         Medications:  None (patient   at medical facility)    Indwelling Lines/Drains at time of discharge:   Lines/Drains/Airways     Central Venous Catheter Line            Trialysis (Dialysis) Catheter 20 1515 right internal jugular 2 days          Drain                 Urethral Catheter 20 0040 3 days         NG/OG Tube 20 1400 Emery sump 14 Fr. Center mouth 2 days          Airway                 Airway - Non-Surgical 20 Endotracheal Tube 3 days          Arterial Line                 Arterial Line 20 1412 Left Radial 2 days              Time spent on the discharge of patient: 35 minutes    RACHEL Guerra MD  Department of Hospital Medicine  [facility]

## 2020-05-04 NOTE — NURSING
Rosi receiving report on the patient. Patient was hypotensive with levophed and vasopressin infusing. Heart rhythm irregular with pauses and multifocal PVC's. Patient was also found to be bradycardic. Atropine administered. MD notified. Blood sugar also reassessed. Please see orders and MAR for subsequent medications administered and infusing.

## 2020-05-04 NOTE — PROGRESS NOTES
DUNIA, severe lactic acidosis , status epilepsy   Now getting bradycardia   ABG reviewed , pH 7.25   On ACVC 25/320  - start on Bicarb drip at 75 ml/hr ; VBG after 2 hours   - get trops. EKG, labs   - start on Dopamine drip to get HR > 60

## 2020-05-04 NOTE — PROGRESS NOTES
Pharmacist Renal Dose Adjustment Note    Sandra Oseguera is a 80 y.o. female being treated with the medication Ampicillin 2gm IV q8h    Patient Data:    Vital Signs (Most Recent):  Temp: (!) 95.4 °F (35.2 °C) (05/03/20 1735)  Pulse: 64 (05/03/20 1735)  Resp: (!) 0 (05/03/20 1735)  BP: (!) 110/59 (05/03/20 1600)  SpO2: 100 % (05/03/20 1735)   Vital Signs (72h Range):  Temp:  [93.6 °F (34.2 °C)-99.3 °F (37.4 °C)]   Pulse:  []   Resp:  [0-56]   BP: ()/(25-93)   SpO2:  [61 %-100 %]   Arterial Line BP: ()/(40-74)      Recent Labs   Lab 05/02/20  1934 05/03/20  0342 05/03/20  1222   CREATININE 2.1* 1.8* 1.7*     Serum creatinine: 1.7 mg/dL (H) 05/03/20 1222  Estimated creatinine clearance: 22.2 mL/min (A)     Medication Dose Route Frequency   Previous Order Ampicillin 2 gm IV q8h   New Order Ampicillin 2 gm IV q12h     Renal dose adjustments performed as noted above.  We will continue monitoring and adjusting as necessary.    Pharmacist: Dav Lora, PharmD  [phone]

## 2020-05-04 NOTE — PLAN OF CARE
Patient received on the mechanical ventilator. ABG obtained due to inability to capture an O2 sats on the GE monitor. Results called to EICU and Dr. De Leon with orders to increase RR to 30bpm. Patient has had deterioration in her status. Repeat  ABG has worsened despite addition of a bicarb infusion and increased MV on the vent. Patient  , taken off ventilator and ETT taken out.

## 2020-05-04 NOTE — PROGRESS NOTES
Informed of patient passing away. Chart reviewed, patient was admitted 3 days ago for rapid AFib with subsequently decompensated with worsening acidosis and concern for sepsis.  Due to worsening despite intubation and dialysis and pressors, she was made DNR.  Patient seen and examined. After 1 minute auscultation, no spontaneous heartbeat or respiration noted. No withdrawal to noxious stimulation. Pupils 7 and fixed bilaterally. Flatline noted in two EKG leads. Patient pronounced  at 2259.

## 2020-05-05 LAB
BACTERIA BLD CULT: NORMAL
BACTERIA BLD CULT: NORMAL
BACTERIA SPEC AEROBE CULT: ABNORMAL
BACTERIA SPEC AEROBE CULT: ABNORMAL
GRAM STN SPEC: ABNORMAL

## 2020-05-26 DIAGNOSIS — I10 ESSENTIAL HYPERTENSION: ICD-10-CM

## 2020-05-26 RX ORDER — BENAZEPRIL HYDROCHLORIDE 20 MG/1
TABLET ORAL
Qty: 90 TABLET | Refills: 3 | OUTPATIENT
Start: 2020-05-26

## 2021-06-26 NOTE — ED PROVIDER NOTES
"Encounter Date: 4/30/2020    SCRIBE #1 NOTE: I, Lizeth Jace, am scribing for, and in the presence of, Dr. Christine.       History     Chief Complaint   Patient presents with    Shortness of Breath     x3 days. EMS reports " dry heaving episode while walking to bathroom" . EMS reports A.fib w. RVR     Time seen by provider: 12:28 PM    This is a 80 y.o. female with a history of HTN, DM, atrial fibrillation, and rheumatoid arthritis who presents with shortness of breath and chest pain for the past 3 days. She states that she feels significant shortness of breath and chest tightness with any activity that resolves with rest. She has chronic, mild dyspnea on exertion but current symptoms are much worse than baseline. She also feels she's had fast heart rate worse with activity and palpitations similar to her last episode of Afib in September. She reports dry cough for 3 days but no fever or chills. This morning, she woke up and had multiple episodes of loose stool associated with lower abdominal cramping and nausea. She adds decreased appetite for 3 days. She denies any myalgias or sore throat. She has no other complaints at this time.    The history is provided by the patient.     Review of patient's allergies indicates:   Allergen Reactions    Codeine Nausea And Vomiting     States can take oxycodone and hydrocodone    Sulfa (sulfonamide antibiotics) Nausea And Vomiting     Past Medical History:   Diagnosis Date    A-fib september    Chronic kidney disease, stage II (mild) 2/3/2014    Diabetes mellitus, type 2 1992    GERD (gastroesophageal reflux disease)     Heart failure, diastolic, chronic 3/3/2014    History of bronchitis     Hypercholesterolemia     Hypertension, malignant 1992    PONV (postoperative nausea and vomiting)     Rheumatoid arthritis 2006    Right bundle branch block 9/12/2018    2018: Diagnosed with RBBB.     Past Surgical History:   Procedure Laterality Date    APPENDECTOMY      " CHOLECYSTECTOMY      HYSTERECTOMY  1976    TOE SURGERY  2015    TREATMENT OF CARDIAC ARRHYTHMIA  9/13/2019    Procedure: Cardioversion or Defibrillation;  Surgeon: Garrett Calderon MD;  Location: [facility] CATH LAB;  Service: Cardiology;;     Family History   Problem Relation Age of Onset    Hypertension Mother     Stroke Mother     Heart attack Father     Diabetes type II Daughter     Hypertension Daughter     Asthma Daughter     No Known Problems Son     No Known Problems Son      Social History     Tobacco Use    Smoking status: Never Smoker    Smokeless tobacco: Never Used   Substance Use Topics    Alcohol use: No    Drug use: Never     Review of Systems   Constitutional: Positive for appetite change (decreased). Negative for chills and fever.   HENT: Negative for sore throat.    Eyes: Negative for visual disturbance.   Respiratory: Positive for cough (non-productive), chest tightness (with exertion) and shortness of breath (with exertion).    Cardiovascular: Positive for chest pain and palpitations.   Gastrointestinal: Positive for abdominal pain and nausea. Negative for vomiting.        Positive for loose stool.   Musculoskeletal: Negative for myalgias.   Skin: Negative for rash.   Neurological: Negative for weakness.   Psychiatric/Behavioral: Negative for confusion.       Physical Exam     Initial Vitals   BP Pulse Resp Temp SpO2   04/30/20 1203 04/30/20 1203 04/30/20 1214 04/30/20 1214 04/30/20 1203   (!) 156/101 (!) 117 18 97.4 °F (36.3 °C) 97 %      MAP       --                Physical Exam    Nursing note and vitals reviewed.  Constitutional: She appears well-developed and well-nourished.   HENT:   Head: Normocephalic and atraumatic.   Eyes: EOM are normal. Pupils are equal, round, and reactive to light.   Neck: Normal range of motion. Neck supple.   Cardiovascular: Normal heart sounds. An irregularly irregular rhythm present.  Tachycardia present.    Pulmonary/Chest: No respiratory distress. She  has no wheezes. She has no rales.   Slight decreased air entry diffusely.   Abdominal: Soft. There is no tenderness. There is no rebound and no guarding.   Musculoskeletal: Normal range of motion.   Trace lower extremity edema.   Neurological: She is alert and oriented to person, place, and time.   Skin: Skin is warm and dry. Capillary refill takes less than 2 seconds. No rash noted.   Psychiatric: Judgment and thought content normal.         ED Course   Procedures  Labs Reviewed   CBC W/ AUTO DIFFERENTIAL - Abnormal; Notable for the following components:       Result Value    RBC 3.64 (*)     Hemoglobin 11.5 (*)     Hematocrit 36.2 (*)     Mean Corpuscular Volume 100 (*)     Mean Corpuscular Hemoglobin 31.6 (*)     Mean Corpuscular Hemoglobin Conc 31.8 (*)     RDW 16.7 (*)     MPV 14.7 (*)     Immature Granulocytes 0.8 (*)     Immature Grans (Abs) 0.07 (*)     Gran% 76.4 (*)     Lymph% 12.4 (*)     All other components within normal limits   COMPREHENSIVE METABOLIC PANEL - Abnormal; Notable for the following components:    CO2 19 (*)     Glucose 207 (*)     BUN, Bld 26 (*)     Creatinine 1.7 (*)     Total Bilirubin 1.6 (*)     AST 53 (*)     Anion Gap 18 (*)     eGFR if  33 (*)     eGFR if non  28 (*)     All other components within normal limits   C-REACTIVE PROTEIN - Abnormal; Notable for the following components:    CRP 12.6 (*)     All other components within normal limits   TROPONIN I - Abnormal; Notable for the following components:    Troponin I 0.051 (*)     All other components within normal limits   B-TYPE NATRIURETIC PEPTIDE - Abnormal; Notable for the following components:     (*)     All other components within normal limits   CK   SARS-COV-2 RNA AMPLIFICATION, QUAL    Narrative:     What symptom criteria does the patient meet?->Cough  What symptom criteria does the patient meet?->Shortness of  breath or difficulty breathing   PROCALCITONIN   MAGNESIUM    HEMOGLOBIN A1C   MAGNESIUM   HEMOGLOBIN A1C   HEMOGLOBIN A1C   POCT GLUCOSE MONITORING CONTINUOUS     EKG Readings: (Independently Interpreted)   Atrial fibrillation. Rate of 122. Right bundle pattern similar to previous.       Imaging Results          X-Ray Chest AP Portable (Final result)  Result time 04/30/20 13:58:22    Final result by Leroy Hernandez Jr., MD (04/30/20 13:58:22)                 Impression:      Findings suggestive of CHF and pulmonary edema.      Electronically signed by: Leroy Romero Jr  Date:    04/30/2020  Time:    13:58             Narrative:    EXAMINATION:  XR CHEST AP PORTABLE    CLINICAL HISTORY:  Suspected Covid-19 Virus Infection;    TECHNIQUE:  Single frontal view of the chest was performed.    COMPARISON:  09/11/2019    FINDINGS:  Cardiac silhouette is mildly enlarged.    There is bilateral pulmonary vascular congestion and interstitial and alveolar lung opacity suspicious for pulmonary edema.    Bibasilar consolidation is suggestive of pleural fluid and atelectasis and or pneumonia.  Visualized bones grossly unremarkable.                                 Medical Decision Making:   History:   Old Medical Records: I decided to obtain old medical records.  Initial Assessment:       80-year-old female with history of HTN, DM, AFib, RA presents with 3 days of exertional SOB, chest tightness, palpitations.  Patient has chronic VALDEZ but states that it is markedly worsened past 3 days, with SOB with minimal activity.  Symptoms improved with rest but she has persistent palpitations that remind her of when she had AFib in September.  Per chart review she was diagnosed with new onset AFib then and required cardioversion to convert her.  She has been on Eliquis since denies any recurrent palpitations until now.  She also had mild dry cough but no fevers or chills.  Today she had multiple episodes of loose stools and 1 episode vomiting, but no current abdominal pain.  On exam patient  with tachycardia with irregular rate concerning for recurrent AFib.  She has trace lower extremity edema, and history of CHF, but she reports decreased p.o. intake over the past 3 days and diarrhea, so more likely volume depleted.      Initial EKG at rate 125 with right bundle branch pattern similar previous and unclear whether patient is sinus or AFib on computer read.  However, when patient stands with minimal activity, she has worsening tachycardia with clear irregular rate consistent with AFib.  I suspect her symptoms may be due to recurrent AFib, versus ACS or CHF exacerbation.  Will also evaluate for COVID-19 given high current community prevalence.    Initial labs with negative COVID 19 rapid test, normal WBC.  Troponin is slightly elevated but below previous baseline levels.  CR 1.7, slightly increased from previous, and patient does have elevated anion gap to 18.  Her glucose is 207 and she did not take her dm meds today, so DKA considered, though could also be from starvation ketoacidosis.  Patient was initially treated with 500 mL normal saline given suspicion for dehydration after diarrhea and poor p.o. Intake, will hold insulin drip at this time.  Her BNP is elevated at 725 and she does have some findings of CHF on her chest x-ray, which I suspect is due to underlying diastolic CHF and rapid AFib.     Discussed with Dr. Calderon, her cardiologist, who recommends IV Lopressor x3 if needed and giving her home dose of 200 mg long-acting Lopressor.  This was given and heart rate somewhat improved from 120s to 110s, with stable BP.  Will continue to monitor BP and give Lasix if it stays stable with rate control.  Will admit to hospitalist for further rate control and possible repeat cardioversion.      Independently Interpreted Test(s):   I have ordered and independently interpreted X-rays - see prior notes.  I have ordered and independently interpreted EKG Reading(s) - see prior notes  Clinical Tests:    Lab Tests: Ordered and Reviewed  Radiological Study: Ordered and Reviewed  Medical Tests: Ordered and Reviewed            Scribe Attestation:   Scribe #1: I performed the above scribed service and the documentation accurately describes the services I performed. I attest to the accuracy of the note.    Attending Attestation:         Attending Critical Care:   Critical Care Times:   Direct Patient Care (initial evaluation, reassessments, and time considering the case)................................................................18 minutes.   Additional History from reviewing old medical records or taking additional history from the family, EMS, PCP, etc.......................6 minutes.   Ordering, Reviewing, and Interpreting Diagnostic Studies...............................................................................................................5 minutes.   Documentation..................................................................................................................................................................................5 minutes.   Consultation with other Physicians. .................................................................................................................................................4 minutes.   ==============================================================  · Total Critical Care Time - exclusive of procedural time: 38 minutes.  ==============================================================  Critical Care Condition: potentially life-threatening   Critical Care Comments: Rapid a-fib, emergent IV rate control     Physician Attestation for Scribe:  Physician Attestation Statement for Scribe #1: I, Dr. Christine, reviewed documentation, as scribed by Lizeth Mccormick in my presence, and it is both accurate and complete.                               Clinical Impression:     1. Paroxysmal atrial fibrillation    2. Tachycardia    3. SOB (shortness of breath)     4. Acute on chronic diastolic heart failure                ED Disposition Condition    Admit                           Pk Christine MD  04/30/20 1605     DISCHARGE

## (undated) DEVICE — GLOVE BIOGEL SKINSENSE PI 8.0

## (undated) DEVICE — SOL NS 1000CC

## (undated) DEVICE — SOL CLEARIFY VISUALIZATION LAP

## (undated) DEVICE — BAG TISSUE RETRIEVAL 225ML

## (undated) DEVICE — SUT VICRYL PLUS 4-0 P3 18IN

## (undated) DEVICE — IRRIGATOR ENDOSCOPY DISP.

## (undated) DEVICE — CATH IV CATHLON W/HUB14GAX

## (undated) DEVICE — CANNULA ENDOPATH XCEL 5X100MM

## (undated) DEVICE — DRESSING LEUKOPLAST FLEX 1X3IN

## (undated) DEVICE — SEE MEDLINE ITEM 156925

## (undated) DEVICE — SUT VICRYL+ 2-0 UR6 27 VIOL

## (undated) DEVICE — TROCAR ENDOPATH XCEL 11MM 10CM

## (undated) DEVICE — ADHESIVE DERMABOND ADVANCED

## (undated) DEVICE — CLOSURE SKIN STERI STRIP 1/2X4

## (undated) DEVICE — NDL INSUF ULTRA VERESS 120MM

## (undated) DEVICE — ELECTRODE REM PLYHSV RETURN 9

## (undated) DEVICE — TROCAR ENDOPATH XCEL 5X100MM

## (undated) DEVICE — SOL 9P NACL IRR PIC IL

## (undated) DEVICE — SCISSOR 5MMX35CM DIRECT DRIVE

## (undated) DEVICE — APPLIER CLIP ENDO LIGAMAX 5MM